# Patient Record
Sex: MALE | Race: BLACK OR AFRICAN AMERICAN | NOT HISPANIC OR LATINO | Employment: UNEMPLOYED | URBAN - NONMETROPOLITAN AREA
[De-identification: names, ages, dates, MRNs, and addresses within clinical notes are randomized per-mention and may not be internally consistent; named-entity substitution may affect disease eponyms.]

---

## 2017-01-09 ENCOUNTER — OFFICE VISIT (OUTPATIENT)
Dept: FAMILY MEDICINE CLINIC | Facility: CLINIC | Age: 57
End: 2017-01-09

## 2017-01-09 VITALS
HEART RATE: 111 BPM | OXYGEN SATURATION: 98 % | HEIGHT: 69 IN | WEIGHT: 315 LBS | RESPIRATION RATE: 16 BRPM | SYSTOLIC BLOOD PRESSURE: 135 MMHG | DIASTOLIC BLOOD PRESSURE: 82 MMHG | BODY MASS INDEX: 46.65 KG/M2 | TEMPERATURE: 97.9 F

## 2017-01-09 DIAGNOSIS — J20.8 ACUTE BRONCHITIS DUE TO OTHER SPECIFIED ORGANISMS: Primary | ICD-10-CM

## 2017-01-09 PROCEDURE — 99213 OFFICE O/P EST LOW 20 MIN: CPT | Performed by: INTERNAL MEDICINE

## 2017-01-09 RX ORDER — DOXYCYCLINE 100 MG/1
100 CAPSULE ORAL 2 TIMES DAILY
Qty: 20 CAPSULE | Refills: 0 | Status: SHIPPED | OUTPATIENT
Start: 2017-01-09 | End: 2017-02-24

## 2017-01-09 RX ORDER — GUAIFENESIN 600 MG/1
1200 TABLET, EXTENDED RELEASE ORAL 2 TIMES DAILY
Qty: 60 TABLET | Refills: 0 | Status: SHIPPED | OUTPATIENT
Start: 2017-01-09 | End: 2017-02-24

## 2017-01-09 NOTE — PATIENT INSTRUCTIONS
afrin in nostril at night prior to sleep.   Vicks vapor rub around nose. At night poir to using cpap.

## 2017-01-09 NOTE — PROGRESS NOTES
"Subjective   Patient ID: Miller Em is a 56 y.o. male Pt is here for management of multiple medical problems.  History of Present Illness  Pt is here for management of multiple medical problems.    Soa. No fever. Myalgias.   Cough with sputum. Sinus pressure and pain.   Started 3 days prior and pt is getting worse. No otc.       The following portions of the patient's history were reviewed and updated as appropriate: allergies, current medications, past family history, past medical history, past social history, past surgical history and problem list.  Review of Systems   Constitutional: Positive for fatigue.   HENT: Positive for congestion, rhinorrhea, sinus pressure, sneezing and sore throat.    Respiratory: Positive for cough, shortness of breath and wheezing.    Neurological: Positive for weakness.       Objective     Visit Vitals   • /82 (BP Location: Left arm, Patient Position: Sitting, Cuff Size: Large Adult)   • Pulse 111   • Temp 97.9 °F (36.6 °C) (Oral)   • Resp 16   • Ht 69\" (175.3 cm)   • Wt (!) 319 lb (145 kg)   • SpO2 98%   • BMI 47.11 kg/m2     Physical Exam  General Appearance:    Alert, cooperative, no distress, appears stated age   Head:    Normocephalic, without obvious abnormality, atraumatic   Eyes:    PERRL, conjunctiva/corneas clear, EOM's intact           Ears:    Normal TM's and external ear canals, both ears   Nose:   Nares normal, septum midline, mucosa normal, no drainage    or sinus tenderness   Throat:   Lips, mucosa, and tongue normal; teeth and gums normal   Neck:   Supple, symmetrical, trachea midline, no adenopathy;        thyroid:  No enlargement/tenderness/nodules; no carotid    bruit or JVD   Back:     Symmetric, no curvature, ROM normal, no CVA tenderness   Lungs:     Course bronchial bs with wheezing  to auscultation bilaterally, respirations unlabored   Chest wall:    No tenderness or deformity   Heart:    Regular rate and rhythm, S1 and S2 normal, no murmur, rub   " or gallop   Abdomen:     Soft, non-tender, bowel sounds active all four quadrants,     no masses, no organomegaly           Extremities:   Extremities normal, atraumatic, no cyanosis or edema   Pulses:   2+ and symmetric all extremities   Skin:   Skin color, texture, turgor normal, no rashes or lesions   Lymph nodes:   Cervical, supraclavicular, and axillary nodes normal   Neurologic:   CNII-XII intact. Normal strength, sensation and reflexes       throughout       Assessment/Plan   Pt is on cpap and this is complicating his illness.           There are no diagnoses linked to this encounter.  Return if symptoms worsen or fail to improve.                   Patient Instructions   afrin in nostril at night prior to sleep.   Vicks vapor rub around nose. At night poir to using cpap.

## 2017-01-09 NOTE — MR AVS SNAPSHOT
Miller Em   1/9/2017 11:30 AM   Office Visit    Dept Phone:  [phone]   Encounter #:  [id number]    Provider:  Praful Rapp MD   Department:  [facility] PRIMARY CARE                Your Full Care Plan              Today's Medication Changes          These changes are accurate as of: 1/9/17 12:03 PM.  If you have any questions, ask your nurse or doctor.               New Medication(s)Ordered:     doxycycline 100 MG capsule   Commonly known as:  MONODOX   Take 1 capsule by mouth 2 (Two) Times a Day.   Started by:  Praful Rapp MD       guaiFENesin 600 MG 12 hr tablet   Commonly known as:  MUCINEX   Take 2 tablets by mouth 2 (Two) Times a Day.   Started by:  Praful Rapp MD         Medication(s)that have changed:     levothyroxine 125 MCG tablet   Commonly known as:  SYNTHROID, LEVOTHROID   Take  by mouth.   What changed:  Another medication with the same name was removed. Continue taking this medication, and follow the directions you see here.   Changed by:  Praful Rapp MD            Where to Get Your Medications      These medications were sent to [facility] [address] [place] [address] - [address] [place], [place] [id number]  - [phone]   [address] [facility] [address]     Phone:  [phone]     doxycycline 100 MG capsule    guaiFENesin 600 MG 12 hr tablet                  Your Updated Medication List          This list is accurate as of: 1/9/17 12:03 PM.  Always use your most recent med list.                allopurinol 100 MG tablet   Commonly known as:  ZYLOPRIM       aspirin 81 MG EC tablet       atorvastatin 20 MG tablet   Commonly known as:  LIPITOR       cholecalciferol 1000 UNITS tablet   Commonly known as:  VITAMIN D3       doxazosin 4 MG tablet   Commonly known as:  CARDURA   TAKE 1 TABLET DAILY AS DIRECTED       doxycycline 100 MG capsule   Commonly known as:  MONODOX   Take 1 capsule by mouth 2 (Two) Times a Day.       fluticasone 50  MCG/ACT nasal spray   Commonly known as:  FLONASE       guaiFENesin 600 MG 12 hr tablet   Commonly known as:  MUCINEX   Take 2 tablets by mouth 2 (Two) Times a Day.       levothyroxine 125 MCG tablet   Commonly known as:  SYNTHROID, LEVOTHROID       losartan 25 MG tablet   Commonly known as:  COZAAR       losartan-hydrochlorothiazide 100-12.5 MG per tablet   Commonly known as:  HYZAAR       metFORMIN 500 MG tablet   Commonly known as:  GLUCOPHAGE       metoprolol succinate XL 50 MG 24 hr tablet   Commonly known as:  TOPROL-XL       MITIGARE 0.6 MG capsule capsule   Generic drug:  colchicine   Take 1 capsule by mouth As Needed (2 pill for gout flair. then 1 per hour till pain gone. or max of 10 pills in 24h, or diarrhea).       Multiple Vitamin tablet       sildenafil 100 MG tablet   Commonly known as:  VIAGRA   Take 1 tablet by mouth Daily As Needed for erectile dysfunction (1/2 to whole pill qd prn.).               You Were Diagnosed With        Codes Comments    Acute bronchitis due to other specified organisms    -  Primary ICD-10-CM: J20.8  ICD-9-CM: 466.0       Instructions    afrin in nostril at night prior to sleep.   Vicks vapor rub around nose. At night poir to using cpap.        Patient Instructions History      Upcoming Appointments     Visit Type Date Time Department    OFFICE VISIT 2017 11:30 AM [facility] [facility]    FOLLOW UP 2017 11:30 AM [facility] NEUROLOGY [place]    OFFICE VISIT 2/10/2017 11:15 AM [facility]      [employer] Signup     [facility] [employer] allows you to send messages to your doctor, view your test results, renew your prescriptions, schedule appointments, and more. To sign up, go to [employer] and click on the Sign Up Now link in the New User? box. Enter your [employer] Activation Code exactly as it appears below along with the last four digits of your Social Security Number and your Date of Birth () to complete the sign-up process. If you do not sign up  "before the expiration date, you must request a new code.    MyChart Activation Code: [email or web address]  Expires: 1/23/2017 12:00 PM    If you have questions, you can email [email or web address] or call [phone] to talk to our MyChart staff. Remember, MyChart is NOT to be used for urgent needs. For medical emergencies, dial 911.               Other Info from Your Visit           Your Appointments     Jan 18, 2017 11:30 AM EST   Follow Up with Buster Macdonald MD, FAAN   [facility] NEUROLOGY (--)    [address]  [facility] [address] [phone]           Arrive 15 minutes prior to appointment.            Feb 10, 2017 11:15 AM EST   Office Visit with Praful Rapp MD   [facility] PRIMARY CARE (--)    [address]  [facility] [address] [phone]           Arrive 15 minutes prior to appointment.              Allergies     Floxin [Ofloxacin]      Lisinopril        Reason for Visit     Hyperparathyroidism follow-up    Sleep Apnea follow-up    sinus congestion green drainage x 3 days    right ear pain x 3 days    Cough coughing up green phlegm x 3 days      Vital Signs     Blood Pressure Pulse Temperature Respirations Height Weight    135/82 (BP Location: Left arm, Patient Position: Sitting, Cuff Size: Large Adult) 111 97.9 °F (36.6 °C) (Oral) 16 69\" (175.3 cm) 319 lb (145 kg)    Oxygen Saturation Body Mass Index Smoking Status             98% 47.11 kg/m2 Former Smoker         Problems and Diagnoses Noted     Acute bronchitis due to other specified organisms    -  Primary        "

## 2017-01-18 ENCOUNTER — OFFICE VISIT (OUTPATIENT)
Dept: NEUROLOGY | Facility: CLINIC | Age: 57
End: 2017-01-18

## 2017-01-18 VITALS
BODY MASS INDEX: 46.65 KG/M2 | HEIGHT: 69 IN | SYSTOLIC BLOOD PRESSURE: 124 MMHG | OXYGEN SATURATION: 98 % | HEART RATE: 89 BPM | WEIGHT: 315 LBS | DIASTOLIC BLOOD PRESSURE: 86 MMHG

## 2017-01-18 DIAGNOSIS — I25.83 CORONARY ARTERY DISEASE DUE TO LIPID RICH PLAQUE: ICD-10-CM

## 2017-01-18 DIAGNOSIS — G25.81 RESTLESS LEGS SYNDROME (RLS): ICD-10-CM

## 2017-01-18 DIAGNOSIS — I25.10 CORONARY ARTERY DISEASE DUE TO LIPID RICH PLAQUE: ICD-10-CM

## 2017-01-18 DIAGNOSIS — G47.34 NOCTURNAL OXYGEN DESATURATION: ICD-10-CM

## 2017-01-18 DIAGNOSIS — G47.19 EXCESSIVE DAYTIME SLEEPINESS: ICD-10-CM

## 2017-01-18 DIAGNOSIS — G47.33 OBSTRUCTIVE SLEEP APNEA: Primary | ICD-10-CM

## 2017-01-18 DIAGNOSIS — R09.81 SINUS CONGESTION: ICD-10-CM

## 2017-01-18 PROCEDURE — 99214 OFFICE O/P EST MOD 30 MIN: CPT | Performed by: PSYCHIATRY & NEUROLOGY

## 2017-01-18 NOTE — PROGRESS NOTES
[facility] NEUROLOGY [place] PROGRESS NOTE  History of Present Illness     Date: 2/26/2017    Patient Identification  Miller Em is a 56 y.o. male.    Patient information was obtained from patient.  History/Exam limitations: none.    Original consultation requested by: MD Dionte      Chief Complaint   Follow-up (CPAP use)      Insomnia   This is a chronic problem. The problem occurs daily. The problem has been gradually worsening. Associated symptoms include headaches. Pertinent negatives include no abdominal pain, anorexia, arthralgias, change in bowel habit, chest pain, chills, congestion, coughing, diaphoresis, fever, joint swelling, myalgias, nausea, neck pain, numbness, rash, sore throat, swollen glands, urinary symptoms, vertigo, visual change, vomiting or weakness. Treatments tried: increase total sleep time.     Migraine and Sleep Apnea   Patient is a pleasant 56-year-old gentleman with history of migraine headache since November 2014.  Patient also with diagnosis of obstructive sleep apnea and had been placed on nasal CPAP.  However patient has difficulty tolerating his nasal CPAP due to the noise of the nasal CPAP machine, or due to his sinus congestion.      PMH:   Past Medical History   Diagnosis Date   • Diabetes    • Diabetes mellitus    • Gout    • Hyperlipidemia    • Hypertension    • Thyroid condition        Past Surgical History:   Past Surgical History   Procedure Laterality Date   • Eye surgery       Cataract       Family Hisotry:   Family History   Problem Relation Age of Onset   • Stroke Other    • Hyperlipidemia Other    • Stroke Father        Social History:   Social History     Social History   • Marital status: Single     Spouse name: N/A   • Number of children: N/A   • Years of education: N/A     Occupational History   • Not on file.     Social History Main Topics   • Smoking status: Former Smoker     Years: 5.00     Types: Cigarettes   • Smokeless tobacco: Never Used       Comment: on and off x 10-15 years 1 pack per week   • Alcohol use Yes      Comment: 1-2 wkly   • Drug use: No   • Sexual activity: Defer     Other Topics Concern   • Not on file     Social History Narrative       Medications:   Current Outpatient Prescriptions   Medication Sig Dispense Refill   • allopurinol (ZYLOPRIM) 100 MG tablet Take 1 tablet by mouth daily.     • atorvastatin (LIPITOR) 20 MG tablet Take 1 tablet by mouth.     • doxazosin (CARDURA) 4 MG tablet TAKE 1 TABLET DAILY AS DIRECTED 90 tablet 2   • metFORMIN (GLUCOPHAGE) 500 MG tablet Take 1 tablet by mouth.     • metoprolol succinate XL (TOPROL-XL) 50 MG 24 hr tablet Take 1 tablet by mouth.     • aspirin 81 MG EC tablet Take  by mouth daily.     • cholecalciferol (VITAMIN D3) 1000 UNITS tablet Take 2 capsules by mouth daily.     • levothyroxine (SYNTHROID, LEVOTHROID) 125 MCG tablet Take 50 mcg by mouth.     • losartan (COZAAR) 100 MG tablet Take 1 tablet by mouth Daily. 90 tablet 3   • MITIGARE 0.6 MG capsule capsule Take 1 capsule by mouth As Needed (2 pill for gout flair. then 1 per hour till pain gone. or max of 10 pills in 24h, or diarrhea). 30 capsule 1   • Multiple Vitamin tablet Take  by mouth daily.     • sildenafil (VIAGRA) 100 MG tablet Take 1 tablet by mouth Daily As Needed for erectile dysfunction (1/2 to whole pill qd prn.). 6 tablet 0     No current facility-administered medications for this visit.        Allergy:   Allergies   Allergen Reactions   • Floxin [Ofloxacin]    • Lisinopril        Review of Systems:  Review of Systems   Constitutional: Negative for chills, diaphoresis and fever.   HENT: Negative for congestion, ear pain, hearing loss, rhinorrhea and sore throat.    Eyes: Negative for pain, discharge and redness.   Respiratory: Negative for cough, shortness of breath, wheezing and stridor.    Cardiovascular: Negative for chest pain, palpitations and leg swelling.   Gastrointestinal: Negative for abdominal pain, anorexia, change  "in bowel habit, constipation, nausea and vomiting.   Endocrine: Negative for cold intolerance, heat intolerance and polyphagia.   Genitourinary: Negative for dysuria, flank pain, frequency and urgency.   Musculoskeletal: Negative for arthralgias, joint swelling, myalgias, neck pain and neck stiffness.   Skin: Negative for pallor, rash and wound.   Allergic/Immunologic: Negative for environmental allergies.   Neurological: Positive for headaches. Negative for dizziness, vertigo, tremors, seizures, syncope, facial asymmetry, speech difficulty, weakness, light-headedness and numbness.   Hematological: Negative for adenopathy.   Psychiatric/Behavioral: Positive for sleep disturbance. Negative for confusion and hallucinations. The patient has insomnia. The patient is not nervous/anxious.        Physical Exam     Vitals:    01/18/17 1139   BP: 124/86   BP Location: Right arm   Patient Position: Sitting   Pulse: 89   SpO2: 98%   Weight: (!) 321 lb (146 kg)   Height: 69\" (175.3 cm)     GENERAL: Patient is pleasant, cooperative, appears to be stated age.  Body habitus is endomorphic.  SKIN AND EXTREMITIES:  No skin rashes or lesions are noted.  No cyanosis, clubbing or edema of the extremities.    HEAD:  Head is normocephalic and atraumatic.    NECK: Neck are non-tender without thyromegaly or adenopathy.  Carotic upstrokes are 1+/4.  No cranial or cervical bruits.  The neck is supple with a full range of motion.   ENT: palate elevate symmetrically, no evidence of high arch palate, tongue midline erythema in posterior pharynx, Mallampati Classification Class III patient also have inflamed nasal turbinate  CARDIOVASCULAR:  Regular rate and rhythm with normal S1 and S2 without rub or gallop.  RESPIRATORY:  Clear to auscultation without wheezes or crackle   ABDOMEN:  Soft and non-tender, positive bowel sound without hepatosplenomegaly  BACK:  Back is straight without midline defect.    PSYCH:  Higher cortical function/mental " status:  The patient is alert.  She is oriented x3 to time, place and person.  Recent and the remote memory appear normal.  The patient has a good fund of knowledge.  There is no visual or auditory hallucination or suicidal or homicidal ideation.  SPEECH:There is no gross evidence of aphasia, dysarthria or agnosia.      CRANIAL NERVES:  Pupils are 4mm, equal round reactive to light, reacting briskly to 2mm without afferent pupillary defect.  Visual fields are intact to confrontation testing.  Fundoscopic examination reveals sharp disk margins with normal vasculature.  No papilledema, hemorrhages or exudates.  Extraocular movements are full and smooth with normal pursuits and saccades.  No nystagmus noted.  The face is symmetric. palate elevate symmetrically, Tongue midline, positive gag reflex. The remainder of the cranial nerves are intact and symmetrical.    MOTOR: Strength is 5/5 throughout with normal tone and bulk with the following exceptions, 4/5 intrinsic muscles of the hands and feet.  No involuntary movements noted.    Deep Tendon Reflexes: are 2/4 and symmetrical in the upper extremities, 2/4 and symmetrical at the knees and 1/4 and symmetrical at the Achilles tendon.  Plantar responses were down-going bilaterally.    SENSATION:  Intact to pinprick, light touch, vibration and proprioception.  Coordination:  The patient normally performs finger-nose-finger, heel-to-knee-to-shin and rapid alternating movements in symmetrical fashion.    COORDINATION AND GAIT:  The patient walks with a narrow-based gait.  Patient is able to heel-toe and tandem walk forward and backwards without difficulty.  Romberg and monopedal  Romberg are negative.    MUSCULOSKELETAL: Range of motion normal, no clubbing, cyanosis, or edema.  No joint swelling.            Records Reviewed: I have personally reviewed his previous medical record.    Miller was seen today for follow-up.    Diagnoses and all orders for this  visit:    Obstructive sleep apnea    Excessive daytime sleepiness    Nocturnal oxygen desaturation    Restless legs syndrome (RLS)    Coronary artery disease due to lipid rich plaque    Sinus congestion      Treatments:  1. Avoid sleep deprivation  2. Need to have regular sleep wake schedule  3. Encourage compliance with nasal CPAP.  4. Continue Flonase for nasal allergy  5. Use Neti Pot for nasal allergy  6.  Counseled patient on sleep apnea as follow  I have counseled patient extensively on Sleep Hygiene including regular sleep wake schedule and stimulus control therapy.  I have also discussed the importance of weight reduction because 10% reduction in body weight can reduce sleep apnea by 50 %. We have also discussed abstaining from smoking and drinking.  I have explained to patient that obstructive apnea episode is defined as the absence of airflow for at least 10 seconds.  Sleep apnea is usually accompanied by snoring, disturbed sleep, and daytime sleepiness. Patients with micrognathia, retrognathia, enlarged tonsils, tongue enlargement, and acromegaly are especially predisposed to obstructive sleep apnea. Abnormalities or weakness in the muscles can also contribute to obstructive sleep apnea. Obesity can also contribute to sleep apnea.     Sleep apnea can lead to a number of complications, ranging from daytime sleepiness to possible increased risk of cardiovascular risks.   Daytime sleepiness is the most serious.  Daytime sleepiness can also increase the risk for accident-related injuries. Several studies have suggested that people with sleep apnea have two to three times as many car accidents, and five to seven times the risk for multiple accidents.   A number of cardiovascular diseases -- including high blood pressure, heart failure, stroke, and heart arrhythmias -- have an association with obstructive sleep apnea.   Up to a third of patients with heart failure also have sleep apnea. Both central and  obstructive sleep apnea are linked with heart failure. Obstructive sleep apnea is also noted to be associated with type 2 diabetes according to Dr. Meier at [facility].  The best treatment for symptomatic obstructive sleep apnea is continuous positive airflow pressure (CPAP). Bilevel positive airway pressure (BPAP) systems may be particularly helpful for patients with coexisting lung disease and those with excessive levels of carbon dioxide.  Other treatment options including UPPP surgery, LAUP surgery, radiofrequency somnoplasty and dental appliances such Meagan or Clearway.      This Document is signed by Buster Macdonald MD, FAAN, FAASM   1/18/2017

## 2017-01-18 NOTE — MR AVS SNAPSHOT
Miller Em   1/18/2017 11:30 AM   Office Visit    Dept Phone:  [phone]   Encounter #:  [id number]    Provider:  Buster Macdonald MD, FAAN   Department:  [facility] NEUROLOGY                Your Full Care Plan              Your Updated Medication List          This list is accurate as of: 1/18/17 11:59 AM.  Always use your most recent med list.                allopurinol 100 MG tablet   Commonly known as:  ZYLOPRIM       aspirin 81 MG EC tablet       atorvastatin 20 MG tablet   Commonly known as:  LIPITOR       cholecalciferol 1000 UNITS tablet   Commonly known as:  VITAMIN D3       doxazosin 4 MG tablet   Commonly known as:  CARDURA   TAKE 1 TABLET DAILY AS DIRECTED       doxycycline 100 MG capsule   Commonly known as:  MONODOX   Take 1 capsule by mouth 2 (Two) Times a Day.       fluticasone 50 MCG/ACT nasal spray   Commonly known as:  FLONASE       guaiFENesin 600 MG 12 hr tablet   Commonly known as:  MUCINEX   Take 2 tablets by mouth 2 (Two) Times a Day.       levothyroxine 125 MCG tablet   Commonly known as:  SYNTHROID, LEVOTHROID       losartan 25 MG tablet   Commonly known as:  COZAAR       losartan-hydrochlorothiazide 100-12.5 MG per tablet   Commonly known as:  HYZAAR       metFORMIN 500 MG tablet   Commonly known as:  GLUCOPHAGE       metoprolol succinate XL 50 MG 24 hr tablet   Commonly known as:  TOPROL-XL       MITIGARE 0.6 MG capsule capsule   Generic drug:  colchicine   Take 1 capsule by mouth As Needed (2 pill for gout flair. then 1 per hour till pain gone. or max of 10 pills in 24h, or diarrhea).       Multiple Vitamin tablet       sildenafil 100 MG tablet   Commonly known as:  VIAGRA   Take 1 tablet by mouth Daily As Needed for erectile dysfunction (1/2 to whole pill qd prn.).               You Were Diagnosed With        Codes Comments    Obstructive sleep apnea    -  Primary ICD-10-CM: G47.33  ICD-9-CM: 327.23     Excessive daytime sleepiness      "ICD-10-CM: G47.19  ICD-9-CM: 780.54     Nocturnal oxygen desaturation     ICD-10-CM: G47.34  ICD-9-CM: 327.24     Restless legs syndrome (RLS)     ICD-10-CM: G25.81  ICD-9-CM: 333.94     Coronary artery disease due to lipid rich plaque     ICD-10-CM: I25.10, I25.83  ICD-9-CM: 414.00, 414.3       Instructions     None    Patient Instructions History      Upcoming Appointments     Visit Type Date Time Department    FOLLOW UP 2017 11:30 AM [facility] NEUROLOGY [place]    OFFICE VISIT 2/10/2017 11:15 AM [facility]      [employer] Signup     [facility] [employer] allows you to send messages to your doctor, view your test results, renew your prescriptions, schedule appointments, and more. To sign up, go to [employer] and click on the Sign Up Now link in the New User? box. Enter your [employer] Activation Code exactly as it appears below along with the last four digits of your Social Security Number and your Date of Birth () to complete the sign-up process. If you do not sign up before the expiration date, you must request a new code.    [employer] Activation Code: [email or web address]  Expires: 2017 12:00 PM    If you have questions, you can email [email or web address] or call [phone] to talk to our [employer] staff. Remember, [employer] is NOT to be used for urgent needs. For medical emergencies, dial 911.               Other Info from Your Visit           Your Appointments     Feb 10, 2017 11:15 AM EST   Office Visit with Praful Rapp MD   [facility] MEDICAL [facility] PRIMARY CARE (--)    [address] [address] [phone]           Arrive 15 minutes prior to appointment.              Allergies     Floxin [Ofloxacin]      Lisinopril        Reason for Visit     Follow-up CPAP use      Vital Signs     Blood Pressure Pulse Height Weight Oxygen Saturation Body Mass Index    124/86 (BP Location: Right arm, Patient Position: Sitting) 89 69\" (175.3 cm) 321 lb (146 " kg) 98% 47.4 kg/m2    Smoking Status                   Former Smoker           Problems and Diagnoses Noted     Sleep apnea    -  Primary    Excessive daytime sleepiness        Nocturnal oxygen desaturation        Restless legs syndrome        Coronary artery disease due to lipid rich plaque

## 2017-02-20 ENCOUNTER — LAB (OUTPATIENT)
Dept: FAMILY MEDICINE CLINIC | Facility: CLINIC | Age: 57
End: 2017-02-20

## 2017-02-20 DIAGNOSIS — E21.3 HYPERPARATHYROIDISM (HCC): ICD-10-CM

## 2017-02-20 DIAGNOSIS — M10.00 ACUTE IDIOPATHIC GOUT, UNSPECIFIED SITE: ICD-10-CM

## 2017-02-20 DIAGNOSIS — E03.9 ACQUIRED HYPOTHYROIDISM: ICD-10-CM

## 2017-02-20 LAB
CA-I SERPL ISE-MCNC: 1.46 MMOL/L (ref 1.12–1.32)
T4 FREE SERPL-MCNC: 1.18 NG/DL (ref 0.89–1.76)
TSH SERPL DL<=0.05 MIU/L-ACNC: 1.69 MIU/ML (ref 0.35–5.35)
URATE SERPL-MCNC: 8.2 MG/DL (ref 3.7–9.2)

## 2017-02-20 PROCEDURE — 84439 ASSAY OF FREE THYROXINE: CPT | Performed by: INTERNAL MEDICINE

## 2017-02-20 PROCEDURE — 83970 ASSAY OF PARATHORMONE: CPT | Performed by: INTERNAL MEDICINE

## 2017-02-20 PROCEDURE — 82330 ASSAY OF CALCIUM: CPT | Performed by: INTERNAL MEDICINE

## 2017-02-20 PROCEDURE — 84443 ASSAY THYROID STIM HORMONE: CPT | Performed by: INTERNAL MEDICINE

## 2017-02-20 PROCEDURE — 84550 ASSAY OF BLOOD/URIC ACID: CPT | Performed by: INTERNAL MEDICINE

## 2017-02-20 PROCEDURE — 84481 FREE ASSAY (FT-3): CPT | Performed by: INTERNAL MEDICINE

## 2017-02-21 LAB
PTH-INTACT SERPL-MCNC: 40 PG/ML (ref 15–65)
T3FREE SERPL-MCNC: 3.1 PG/ML (ref 2–4.4)

## 2017-02-24 ENCOUNTER — OFFICE VISIT (OUTPATIENT)
Dept: FAMILY MEDICINE CLINIC | Facility: CLINIC | Age: 57
End: 2017-02-24

## 2017-02-24 VITALS
SYSTOLIC BLOOD PRESSURE: 110 MMHG | RESPIRATION RATE: 16 BRPM | OXYGEN SATURATION: 99 % | BODY MASS INDEX: 46.65 KG/M2 | HEIGHT: 69 IN | WEIGHT: 315 LBS | TEMPERATURE: 98.2 F | HEART RATE: 76 BPM | DIASTOLIC BLOOD PRESSURE: 67 MMHG

## 2017-02-24 DIAGNOSIS — E21.3 HYPERPARATHYROIDISM (HCC): ICD-10-CM

## 2017-02-24 DIAGNOSIS — I10 ESSENTIAL HYPERTENSION: Primary | ICD-10-CM

## 2017-02-24 DIAGNOSIS — E03.9 ACQUIRED HYPOTHYROIDISM: ICD-10-CM

## 2017-02-24 DIAGNOSIS — E78.00 PURE HYPERCHOLESTEROLEMIA: ICD-10-CM

## 2017-02-24 DIAGNOSIS — M1A.9XX0 CHRONIC GOUT WITHOUT TOPHUS, UNSPECIFIED CAUSE, UNSPECIFIED SITE: ICD-10-CM

## 2017-02-24 PROCEDURE — 99214 OFFICE O/P EST MOD 30 MIN: CPT | Performed by: INTERNAL MEDICINE

## 2017-02-24 RX ORDER — LOSARTAN POTASSIUM 100 MG/1
100 TABLET ORAL DAILY
Qty: 90 TABLET | Refills: 3 | Status: SHIPPED | OUTPATIENT
Start: 2017-02-24 | End: 2017-05-09 | Stop reason: SDUPTHER

## 2017-02-24 NOTE — PROGRESS NOTES
"Subjective   Patient ID: Miller Em is a 56 y.o. male Pt is here for management of multiple medical problems.  History of Present Illness  Pt is here for management of multiple medical problems.    oa in hand flaired up and took advil and it help.   Otherwise feels well. No cp no soa.     BP going low at times.   Light headed at times. Hr fast when bp low.           The following portions of the patient's history were reviewed and updated as appropriate: allergies, current medications, past family history, past medical history, past social history, past surgical history and problem list.      Review of Systems   Constitutional: Negative for fatigue.   Musculoskeletal: Positive for arthralgias.   All other systems reviewed and are negative.      Objective     Visit Vitals   • /67 (BP Location: Left arm, Patient Position: Sitting, Cuff Size: Large Adult)   • Pulse 76   • Temp 98.2 °F (36.8 °C) (Oral)   • Resp 16   • Ht 69\" (175.3 cm)   • Wt (!) 323 lb (147 kg)   • SpO2 99%   • BMI 47.7 kg/m2     Physical Exam  General Appearance:    Alert, cooperative, no distress, appears stated age   Head:    Normocephalic, without obvious abnormality, atraumatic   Eyes:    PERRL, conjunctiva/corneas clear, EOM's intact           Ears:    Normal TM's and external ear canals, both ears   Nose:   Nares normal, septum midline, mucosa normal, no drainage    or sinus tenderness   Throat:   Lips, mucosa, and tongue normal; teeth and gums normal   Neck:   Supple, symmetrical, trachea midline, no adenopathy;        thyroid:  No enlargement/tenderness/nodules; no carotid    bruit or JVD   Back:     Symmetric, no curvature, ROM normal, no CVA tenderness   Lungs:     Clear to auscultation bilaterally, respirations unlabored   Chest wall:    No tenderness or deformity   Heart:    Regular rate and rhythm, S1 and S2 normal, no murmur, rub   or gallop   Abdomen:     Soft, non-tender, bowel sounds active all four quadrants,     no " masses, no organomegaly. Large panus.              Extremities:   Extremities normal, atraumatic, +2 edema   Pulses:   2+ and symmetric all extremities   Skin:   Skin color, texture, turgor normal, no rashes or lesions   Lymph nodes:   Cervical, supraclavicular, and axillary nodes normal   Neurologic:   CNII-XII intact. Normal strength, sensation and reflexes       throughout       Assessment/Plan     Labs stable. Pt will start back on allopurinol  Low bp and pt willing to hold off on hctz.   Meds changed.       Miller was seen today for hypothyroidism, hypertension, diabetes, gout and hyperparthyrodism.    Diagnoses and all orders for this visit:    Essential hypertension  -     losartan (COZAAR) 100 MG tablet; Take 1 tablet by mouth Daily.  -     TSH; Future  -     T4, Free; Future  -     CBC & Differential; Future  -     Comprehensive Metabolic Panel; Future  -     Lipid Panel; Future  -     Hemoglobin A1c; Future  -     MicroAlbumin, Urine, Random; Future  -     Calcium, Ionized; Future  -     PTH, Intact; Future    Pure hypercholesterolemia  -     losartan (COZAAR) 100 MG tablet; Take 1 tablet by mouth Daily.  -     TSH; Future  -     T4, Free; Future  -     CBC & Differential; Future  -     Comprehensive Metabolic Panel; Future  -     Lipid Panel; Future  -     Hemoglobin A1c; Future  -     MicroAlbumin, Urine, Random; Future  -     Calcium, Ionized; Future  -     PTH, Intact; Future    Acquired hypothyroidism  -     losartan (COZAAR) 100 MG tablet; Take 1 tablet by mouth Daily.  -     TSH; Future  -     T4, Free; Future  -     CBC & Differential; Future  -     Comprehensive Metabolic Panel; Future  -     Lipid Panel; Future  -     Hemoglobin A1c; Future  -     MicroAlbumin, Urine, Random; Future  -     Calcium, Ionized; Future  -     PTH, Intact; Future    Hyperparathyroidism  -     losartan (COZAAR) 100 MG tablet; Take 1 tablet by mouth Daily.  -     TSH; Future  -     T4, Free; Future  -     CBC &  Differential; Future  -     Comprehensive Metabolic Panel; Future  -     Lipid Panel; Future  -     Hemoglobin A1c; Future  -     MicroAlbumin, Urine, Random; Future  -     Calcium, Ionized; Future  -     PTH, Intact; Future    Chronic gout without tophus, unspecified cause, unspecified site  -     Uric Acid; Future      No Follow-up on file.                   There are no Patient Instructions on file for this visit.

## 2017-03-10 RX ORDER — METOPROLOL SUCCINATE 50 MG/1
TABLET, EXTENDED RELEASE ORAL
Qty: 90 TABLET | Refills: 1 | Status: SHIPPED | OUTPATIENT
Start: 2017-03-10 | End: 2017-09-26 | Stop reason: SDUPTHER

## 2017-03-10 RX ORDER — ALLOPURINOL 100 MG/1
TABLET ORAL
Qty: 90 TABLET | Refills: 1 | Status: SHIPPED | OUTPATIENT
Start: 2017-03-10 | End: 2017-09-26 | Stop reason: SDUPTHER

## 2017-03-10 RX ORDER — ATORVASTATIN CALCIUM 20 MG/1
TABLET, FILM COATED ORAL
Qty: 90 TABLET | Refills: 1 | Status: SHIPPED | OUTPATIENT
Start: 2017-03-10 | End: 2017-09-26 | Stop reason: SDUPTHER

## 2017-05-09 DIAGNOSIS — E78.00 PURE HYPERCHOLESTEROLEMIA: ICD-10-CM

## 2017-05-09 DIAGNOSIS — E21.3 HYPERPARATHYROIDISM (HCC): ICD-10-CM

## 2017-05-09 DIAGNOSIS — E03.9 ACQUIRED HYPOTHYROIDISM: ICD-10-CM

## 2017-05-09 DIAGNOSIS — I10 ESSENTIAL HYPERTENSION: ICD-10-CM

## 2017-05-09 RX ORDER — LOSARTAN POTASSIUM 100 MG/1
100 TABLET ORAL DAILY
Qty: 90 TABLET | Refills: 3 | Status: SHIPPED | OUTPATIENT
Start: 2017-05-09 | End: 2018-02-01 | Stop reason: SDUPTHER

## 2017-07-24 ENCOUNTER — LAB (OUTPATIENT)
Dept: FAMILY MEDICINE CLINIC | Facility: CLINIC | Age: 57
End: 2017-07-24

## 2017-07-24 DIAGNOSIS — E21.3 HYPERPARATHYROIDISM (HCC): ICD-10-CM

## 2017-07-24 DIAGNOSIS — I10 ESSENTIAL HYPERTENSION: ICD-10-CM

## 2017-07-24 DIAGNOSIS — E03.9 ACQUIRED HYPOTHYROIDISM: ICD-10-CM

## 2017-07-24 DIAGNOSIS — E78.00 PURE HYPERCHOLESTEROLEMIA: ICD-10-CM

## 2017-07-24 DIAGNOSIS — M1A.9XX0 CHRONIC GOUT WITHOUT TOPHUS, UNSPECIFIED CAUSE, UNSPECIFIED SITE: ICD-10-CM

## 2017-07-25 LAB
ALBUMIN SERPL-MCNC: 4.2 G/DL (ref 3.5–5)
ALBUMIN/GLOB SERPL: 1.8 G/DL (ref 1–2)
ALP SERPL-CCNC: 65 U/L (ref 38–126)
ALT SERPL-CCNC: 49 U/L (ref 13–69)
AST SERPL-CCNC: 37 U/L (ref 15–46)
BASOPHILS # BLD AUTO: 0.08 10*3/MM3 (ref 0–0.2)
BASOPHILS NFR BLD AUTO: 1.5 % (ref 0–2.5)
BILIRUB SERPL-MCNC: 0.5 MG/DL (ref 0.2–1.3)
BUN SERPL-MCNC: 16 MG/DL (ref 7–20)
BUN/CREAT SERPL: 12.3 (ref 6.3–21.9)
CA-I SERPL ISE-MCNC: 6.3 MG/DL (ref 4.5–5.6)
CALCIUM SERPL-MCNC: 11 MG/DL (ref 8.4–10.2)
CHLORIDE SERPL-SCNC: 109 MMOL/L (ref 98–107)
CHOLEST SERPL-MCNC: 108 MG/DL (ref 0–199)
CO2 SERPL-SCNC: 26 MMOL/L (ref 26–30)
CREAT SERPL-MCNC: 1.3 MG/DL (ref 0.6–1.3)
EOSINOPHIL # BLD AUTO: 0.27 10*3/MM3 (ref 0–0.7)
EOSINOPHIL NFR BLD AUTO: 4.9 % (ref 0–7)
ERYTHROCYTE [DISTWIDTH] IN BLOOD BY AUTOMATED COUNT: 18.6 % (ref 11.5–14.5)
GLOBULIN SER CALC-MCNC: 2.4 GM/DL
GLUCOSE SERPL-MCNC: 100 MG/DL (ref 74–98)
HBA1C MFR BLD: 6.6 %
HCT VFR BLD AUTO: 50.1 % (ref 42–52)
HDLC SERPL-MCNC: 28 MG/DL (ref 40–60)
HGB BLD-MCNC: 15.6 G/DL (ref 14–18)
IMM GRANULOCYTES # BLD: 0.02 10*3/MM3 (ref 0–0.06)
IMM GRANULOCYTES NFR BLD: 0.4 % (ref 0–0.6)
LDLC SERPL CALC-MCNC: 58 MG/DL (ref 0–99)
LYMPHOCYTES # BLD AUTO: 2.02 10*3/MM3 (ref 0.6–3.4)
LYMPHOCYTES NFR BLD AUTO: 36.7 % (ref 10–50)
MCH RBC QN AUTO: 27 PG (ref 27–31)
MCHC RBC AUTO-ENTMCNC: 31.1 G/DL (ref 30–37)
MCV RBC AUTO: 86.7 FL (ref 80–94)
MICROALBUMIN UR-MCNC: 20.1 UG/ML
MONOCYTES # BLD AUTO: 0.59 10*3/MM3 (ref 0–0.9)
MONOCYTES NFR BLD AUTO: 10.7 % (ref 0–12)
NEUTROPHILS # BLD AUTO: 2.53 10*3/MM3 (ref 2–6.9)
NEUTROPHILS NFR BLD AUTO: 45.8 % (ref 37–80)
NRBC BLD AUTO-RTO: 0 /100 WBC (ref 0–0)
PLATELET # BLD AUTO: 187 10*3/MM3 (ref 130–400)
POTASSIUM SERPL-SCNC: 4.7 MMOL/L (ref 3.5–5.1)
PROT SERPL-MCNC: 6.6 G/DL (ref 6.3–8.2)
PTH-INTACT SERPL-MCNC: 86 PG/ML (ref 15–65)
RBC # BLD AUTO: 5.78 10*6/MM3 (ref 4.7–6.1)
SODIUM SERPL-SCNC: 145 MMOL/L (ref 137–145)
T4 FREE SERPL-MCNC: 1.18 NG/DL (ref 0.78–2.19)
TRIGL SERPL-MCNC: 111 MG/DL
TSH SERPL DL<=0.005 MIU/L-ACNC: 2.61 MIU/ML (ref 0.47–4.68)
URATE SERPL-MCNC: 7 MG/DL (ref 2.5–8.5)
VLDLC SERPL CALC-MCNC: 22.2 MG/DL
WBC # BLD AUTO: 5.51 10*3/MM3 (ref 4.8–10.8)

## 2017-07-28 ENCOUNTER — OFFICE VISIT (OUTPATIENT)
Dept: FAMILY MEDICINE CLINIC | Facility: CLINIC | Age: 57
End: 2017-07-28

## 2017-07-28 ENCOUNTER — HOSPITAL ENCOUNTER (OUTPATIENT)
Dept: GENERAL RADIOLOGY | Facility: HOSPITAL | Age: 57
Discharge: HOME | End: 2017-07-28
Attending: INTERNAL MEDICINE | Admitting: INTERNAL MEDICINE

## 2017-07-28 VITALS
OXYGEN SATURATION: 98 % | WEIGHT: 315 LBS | SYSTOLIC BLOOD PRESSURE: 113 MMHG | DIASTOLIC BLOOD PRESSURE: 92 MMHG | TEMPERATURE: 98.1 F | RESPIRATION RATE: 16 BRPM | HEART RATE: 68 BPM | HEIGHT: 69 IN | BODY MASS INDEX: 46.65 KG/M2

## 2017-07-28 DIAGNOSIS — G47.33 OSA (OBSTRUCTIVE SLEEP APNEA): ICD-10-CM

## 2017-07-28 DIAGNOSIS — I10 ESSENTIAL HYPERTENSION: Primary | ICD-10-CM

## 2017-07-28 DIAGNOSIS — E03.9 ACQUIRED HYPOTHYROIDISM: ICD-10-CM

## 2017-07-28 DIAGNOSIS — M1A.9XX0 CHRONIC GOUT WITHOUT TOPHUS, UNSPECIFIED CAUSE, UNSPECIFIED SITE: ICD-10-CM

## 2017-07-28 DIAGNOSIS — I10 BENIGN ESSENTIAL HYPERTENSION: ICD-10-CM

## 2017-07-28 DIAGNOSIS — E21.3 HYPERPARATHYROIDISM (HCC): ICD-10-CM

## 2017-07-28 DIAGNOSIS — R91.8 LUNG MASS: Primary | ICD-10-CM

## 2017-07-28 PROCEDURE — 71020 HC CHEST PA AND LATERAL: CPT

## 2017-07-28 PROCEDURE — 99214 OFFICE O/P EST MOD 30 MIN: CPT | Performed by: INTERNAL MEDICINE

## 2017-07-28 RX ORDER — LEVOTHYROXINE SODIUM 137 UG/1
137 TABLET ORAL DAILY
Qty: 90 TABLET | Refills: 3 | Status: SHIPPED | OUTPATIENT
Start: 2017-07-28 | End: 2017-08-04 | Stop reason: SDUPTHER

## 2017-07-28 NOTE — PROGRESS NOTES
"Subjective   Patient ID: Miller Em is a 57 y.o. male Pt is here for management of multiple medical problems.    Chief Complaint   Patient presents with   • Hypertension     5 month follow-up       History of Present Illness  Feels well.       Tolerating meds well.       The following portions of the patient's history were reviewed and updated as appropriate: allergies, current medications, past family history, past medical history, past social history, past surgical history and problem list.      Review of Systems   Constitutional: Negative for fatigue.   All other systems reviewed and are negative.      Objective     /92 (BP Location: Left arm, Patient Position: Sitting, Cuff Size: Large Adult)  Pulse 68  Temp 98.1 °F (36.7 °C) (Oral)   Resp 16  Ht 69\" (175.3 cm)  Wt (!) 318 lb (144 kg)  SpO2 98%  BMI 46.96 kg/m2  Physical Exam  General Appearance:    Alert, cooperative, no distress, appears stated age   Head:    Normocephalic, without obvious abnormality, atraumatic   Eyes:    PERRL, conjunctiva/corneas clear, EOM's intact           Ears:    Normal TM's and external ear canals, both ears   Nose:   Nares normal, septum midline, mucosa normal, no drainage    or sinus tenderness   Throat:   Lips, mucosa, and tongue normal; teeth and gums normal   Neck:   Supple, symmetrical, trachea midline, no adenopathy;        thyroid:  No enlargement/tenderness/nodules; no carotid    bruit or JVD   Back:     Symmetric, no curvature, ROM normal, no CVA tenderness   Lungs:     Clear to auscultation bilaterally, respirations unlabored   Chest wall:    No tenderness or deformity   Heart:    Regular rate and rhythm, S1 and S2 normal, no murmur, rub   or gallop   Abdomen:     Soft, non-tender, bowel sounds active all four quadrants,     no masses, no organomegaly           Extremities:   Extremities normal, atraumatic, no cyanosis or edema   Pulses:   2+ and symmetric all extremities   Skin:   Skin color, texture, " turgor normal, no rashes or lesions   Lymph nodes:   Cervical, supraclavicular, and axillary nodes normal   Neurologic:   CNII-XII intact. Normal strength, sensation and reflexes       throughout       Assessment/Plan     Not compliant with edward treatment.    Labs discussed and stable.           Miller was seen today for hypertension.    Diagnoses and all orders for this visit:    Essential hypertension  -     TSH  -     T4, Free  -     T3, Free  -     PTH, Intact & Calcium  -     Calcium, Ionized  -     Comprehensive Metabolic Panel  -     CBC & Differential  -     Hemoglobin A1c    Acquired hypothyroidism  -     TSH  -     T4, Free  -     T3, Free  -     PTH, Intact & Calcium  -     Calcium, Ionized  -     Comprehensive Metabolic Panel  -     CBC & Differential  -     Hemoglobin A1c    Benign essential hypertension  -     TSH  -     T4, Free  -     T3, Free  -     PTH, Intact & Calcium  -     Calcium, Ionized  -     Comprehensive Metabolic Panel  -     CBC & Differential  -     Hemoglobin A1c  -     XR Chest PA & Lateral; Future    Hyperparathyroidism  -     TSH  -     T4, Free  -     T3, Free  -     PTH, Intact & Calcium  -     Calcium, Ionized  -     Comprehensive Metabolic Panel  -     CBC & Differential  -     Hemoglobin A1c  -     XR Chest PA & Lateral; Future    Chronic gout without tophus, unspecified cause, unspecified site  -     TSH  -     T4, Free  -     T3, Free  -     PTH, Intact & Calcium  -     Calcium, Ionized  -     Comprehensive Metabolic Panel  -     CBC & Differential  -     Hemoglobin A1c    EDWARD (obstructive sleep apnea)  -     TSH  -     T4, Free  -     T3, Free  -     PTH, Intact & Calcium  -     Calcium, Ionized  -     Comprehensive Metabolic Panel  -     CBC & Differential  -     Hemoglobin A1c    Other orders  -     Tdap (BOOSTRIX) 5-2.5-18.5 LF-MCG/0.5 injection; Inject 0.5 mL into the shoulder, thigh, or buttocks 1 (One) Time for 1 dose.  -     levothyroxine (SYNTHROID, LEVOTHROID) 137  MCG tablet; Take 1 tablet by mouth Daily.      Return in about 6 months (around 1/28/2018).                   There are no Patient Instructions on file for this visit.

## 2017-08-04 RX ORDER — LEVOTHYROXINE SODIUM 137 UG/1
137 TABLET ORAL DAILY
Qty: 90 TABLET | Refills: 3 | Status: SHIPPED | OUTPATIENT
Start: 2017-08-04 | End: 2018-02-01 | Stop reason: SDUPTHER

## 2017-08-07 ENCOUNTER — HOSPITAL ENCOUNTER (OUTPATIENT)
Dept: CT IMAGING | Facility: HOSPITAL | Age: 57
Discharge: HOME | End: 2017-08-07
Attending: INTERNAL MEDICINE | Admitting: INTERNAL MEDICINE

## 2017-08-07 DIAGNOSIS — R91.8 LUNG MASS: ICD-10-CM

## 2017-08-07 PROCEDURE — 0 IOPAMIDOL PER 1 ML: Performed by: INTERNAL MEDICINE

## 2017-08-07 PROCEDURE — 71260 CT THORAX DX C+: CPT

## 2017-08-07 RX ADMIN — IOPAMIDOL 100 ML: 510 INJECTION, SOLUTION INTRAVASCULAR at 09:00

## 2017-08-14 DIAGNOSIS — E27.8 RIGHT ADRENAL MASS (HCC): Primary | ICD-10-CM

## 2017-09-26 RX ORDER — DOXAZOSIN MESYLATE 4 MG/1
TABLET ORAL
Qty: 90 TABLET | Refills: 2 | Status: SHIPPED | OUTPATIENT
Start: 2017-09-26 | End: 2018-02-01 | Stop reason: SDUPTHER

## 2017-09-26 RX ORDER — ATORVASTATIN CALCIUM 20 MG/1
TABLET, FILM COATED ORAL
Qty: 90 TABLET | Refills: 2 | Status: SHIPPED | OUTPATIENT
Start: 2017-09-26 | End: 2018-02-01 | Stop reason: SDUPTHER

## 2017-09-26 RX ORDER — METOPROLOL SUCCINATE 50 MG/1
TABLET, EXTENDED RELEASE ORAL
Qty: 90 TABLET | Refills: 2 | Status: SHIPPED | OUTPATIENT
Start: 2017-09-26 | End: 2018-02-01 | Stop reason: SDUPTHER

## 2017-09-26 RX ORDER — ALLOPURINOL 100 MG/1
TABLET ORAL
Qty: 90 TABLET | Refills: 1 | Status: SHIPPED | OUTPATIENT
Start: 2017-09-26 | End: 2018-02-01 | Stop reason: SDUPTHER

## 2017-12-26 ENCOUNTER — HOSPITAL ENCOUNTER (OUTPATIENT)
Dept: CT IMAGING | Facility: HOSPITAL | Age: 57
Discharge: HOME | End: 2017-12-26
Attending: INTERNAL MEDICINE | Admitting: INTERNAL MEDICINE

## 2017-12-26 DIAGNOSIS — E27.8 RIGHT ADRENAL MASS (HCC): ICD-10-CM

## 2017-12-26 LAB
CREAT BLD-MCNC: 1.3 MG/DL (ref 0.6–1.3)
GFR SERPL CREATININE-BSD FRML MDRD: 68 ML/MIN/1.73

## 2017-12-26 PROCEDURE — 0 IOPAMIDOL 61 % SOLUTION: Performed by: INTERNAL MEDICINE

## 2017-12-26 PROCEDURE — 82565 ASSAY OF CREATININE: CPT | Performed by: INTERNAL MEDICINE

## 2017-12-26 PROCEDURE — 74178 CT ABD&PLV WO CNTR FLWD CNTR: CPT

## 2017-12-26 RX ADMIN — IOPAMIDOL 100 ML: 612 INJECTION, SOLUTION INTRAVENOUS at 09:00

## 2017-12-27 LAB
ALBUMIN SERPL-MCNC: 4.4 G/DL (ref 3.5–5)
ALBUMIN/GLOB SERPL: 1.8 G/DL (ref 1–2)
ALP SERPL-CCNC: 71 U/L (ref 38–126)
ALT SERPL-CCNC: 55 U/L (ref 13–69)
AST SERPL-CCNC: 32 U/L (ref 15–46)
BASOPHILS # BLD AUTO: 0.05 10*3/MM3 (ref 0–0.2)
BASOPHILS NFR BLD AUTO: 0.9 % (ref 0–2.5)
BILIRUB SERPL-MCNC: 0.3 MG/DL (ref 0.2–1.3)
BUN SERPL-MCNC: 14 MG/DL (ref 7–20)
BUN/CREAT SERPL: 11.7 (ref 6.3–21.9)
CA-I SERPL ISE-MCNC: 6 MG/DL (ref 4.5–5.6)
CALCIUM SERPL-MCNC: 11.4 MG/DL (ref 8.4–10.2)
CHLORIDE SERPL-SCNC: 106 MMOL/L (ref 98–107)
CO2 SERPL-SCNC: 28 MMOL/L (ref 26–30)
CREAT SERPL-MCNC: 1.2 MG/DL (ref 0.6–1.3)
EOSINOPHIL # BLD AUTO: 0.13 10*3/MM3 (ref 0–0.7)
EOSINOPHIL NFR BLD AUTO: 2.3 % (ref 0–7)
ERYTHROCYTE [DISTWIDTH] IN BLOOD BY AUTOMATED COUNT: 18.1 % (ref 11.5–14.5)
GLOBULIN SER CALC-MCNC: 2.5 GM/DL
GLUCOSE SERPL-MCNC: 99 MG/DL (ref 74–98)
HBA1C MFR BLD: 6.9 %
HCT VFR BLD AUTO: 49.6 % (ref 42–52)
HGB BLD-MCNC: 14.7 G/DL (ref 14–18)
IMM GRANULOCYTES # BLD: 0.03 10*3/MM3 (ref 0–0.06)
IMM GRANULOCYTES NFR BLD: 0.5 % (ref 0–0.6)
INTACT PTH: NORMAL
LYMPHOCYTES # BLD AUTO: 2.09 10*3/MM3 (ref 0.6–3.4)
LYMPHOCYTES NFR BLD AUTO: 37.7 % (ref 10–50)
MCH RBC QN AUTO: 25.9 PG (ref 27–31)
MCHC RBC AUTO-ENTMCNC: 29.6 G/DL (ref 30–37)
MCV RBC AUTO: 87.5 FL (ref 80–94)
MONOCYTES # BLD AUTO: 0.63 10*3/MM3 (ref 0–0.9)
MONOCYTES NFR BLD AUTO: 11.4 % (ref 0–12)
NEUTROPHILS # BLD AUTO: 2.62 10*3/MM3 (ref 2–6.9)
NEUTROPHILS NFR BLD AUTO: 47.2 % (ref 37–80)
NRBC BLD AUTO-RTO: 0 /100 WBC (ref 0–0)
PLATELET # BLD AUTO: 199 10*3/MM3 (ref 130–400)
POTASSIUM SERPL-SCNC: 4.6 MMOL/L (ref 3.5–5.1)
PROT SERPL-MCNC: 6.9 G/DL (ref 6.3–8.2)
PTH-INTACT SERPL-MCNC: 55 PG/ML (ref 15–65)
RBC # BLD AUTO: 5.67 10*6/MM3 (ref 4.7–6.1)
SODIUM SERPL-SCNC: 144 MMOL/L (ref 137–145)
T3FREE SERPL-MCNC: 3 PG/ML (ref 2–4.4)
T4 FREE SERPL-MCNC: 1.25 NG/DL (ref 0.78–2.19)
TSH SERPL DL<=0.005 MIU/L-ACNC: 2.01 MIU/ML (ref 0.47–4.68)
WBC # BLD AUTO: 5.55 10*3/MM3 (ref 4.8–10.8)

## 2018-01-11 ENCOUNTER — OFFICE VISIT (OUTPATIENT)
Dept: INTERNAL MEDICINE | Facility: CLINIC | Age: 58
End: 2018-01-11

## 2018-01-11 ENCOUNTER — HOSPITAL ENCOUNTER (OUTPATIENT)
Dept: GENERAL RADIOLOGY | Facility: HOSPITAL | Age: 58
Discharge: HOME | End: 2018-01-11
Admitting: INTERNAL MEDICINE

## 2018-01-11 VITALS
TEMPERATURE: 98.1 F | HEIGHT: 69 IN | HEART RATE: 78 BPM | BODY MASS INDEX: 46.65 KG/M2 | OXYGEN SATURATION: 99 % | SYSTOLIC BLOOD PRESSURE: 125 MMHG | RESPIRATION RATE: 16 BRPM | DIASTOLIC BLOOD PRESSURE: 84 MMHG | WEIGHT: 315 LBS

## 2018-01-11 DIAGNOSIS — G47.33 OSA (OBSTRUCTIVE SLEEP APNEA): ICD-10-CM

## 2018-01-11 DIAGNOSIS — M25.532 LEFT WRIST PAIN: ICD-10-CM

## 2018-01-11 DIAGNOSIS — I10 ESSENTIAL HYPERTENSION: Primary | ICD-10-CM

## 2018-01-11 DIAGNOSIS — E03.9 ACQUIRED HYPOTHYROIDISM: ICD-10-CM

## 2018-01-11 DIAGNOSIS — E27.8 ADRENAL MASS, RIGHT (HCC): ICD-10-CM

## 2018-01-11 DIAGNOSIS — E11.9 DIABETES MELLITUS WITHOUT COMPLICATION (HCC): ICD-10-CM

## 2018-01-11 DIAGNOSIS — E78.00 PURE HYPERCHOLESTEROLEMIA: ICD-10-CM

## 2018-01-11 PROCEDURE — 73110 X-RAY EXAM OF WRIST: CPT

## 2018-01-11 PROCEDURE — 99214 OFFICE O/P EST MOD 30 MIN: CPT | Performed by: INTERNAL MEDICINE

## 2018-01-11 NOTE — PROGRESS NOTES
Subjective     Patient ID: Miller Em is a 57 y.o. male. Patient is here for management of multiple medical problems.     Chief Complaint   Patient presents with   • Hypertension     6 month follow-up   • Pain     patient having pain on and off in his left wrist and right shoulder x 2 months, worse the last few weeks     Hypertension   This is a chronic problem. The current episode started more than 1 year ago. The problem has been waxing and waning since onset. The problem is controlled. Pertinent negatives include no anxiety, blurred vision, chest pain or headaches. Past treatments include angiotensin blockers. The current treatment provides significant improvement. There are no compliance problems.    Pain   This is a chronic problem. The current episode started more than 1 year ago. Pertinent negatives include no chest pain or headaches.              The following portions of the patient's history were reviewed and updated as appropriate: allergies, current medications, past family history, past medical history, past social history, past surgical history and problem list.    Review of Systems   Eyes: Negative for blurred vision.   Cardiovascular: Negative for chest pain.   Neurological: Negative for headaches.       Current Outpatient Prescriptions:   •  aspirin 81 MG EC tablet, Take  by mouth daily., Disp: , Rfl:   •  atorvastatin (LIPITOR) 20 MG tablet, TAKE 1 TABLET DAILY, Disp: 90 tablet, Rfl: 2  •  cholecalciferol (VITAMIN D3) 1000 UNITS tablet, Take 2 capsules by mouth daily., Disp: , Rfl:   •  doxazosin (CARDURA) 4 MG tablet, TAKE 1 TABLET DAILY AS DIRECTED, Disp: 90 tablet, Rfl: 2  •  levothyroxine (SYNTHROID, LEVOTHROID) 137 MCG tablet, Take 1 tablet by mouth Daily., Disp: 90 tablet, Rfl: 3  •  losartan (COZAAR) 100 MG tablet, Take 1 tablet by mouth Daily., Disp: 90 tablet, Rfl: 3  •  metFORMIN (GLUCOPHAGE) 500 MG tablet, TAKE 1 TABLET DAILY, Disp: 90 tablet, Rfl: 2  •  metoprolol succinate XL  "(TOPROL-XL) 50 MG 24 hr tablet, TAKE 1 TABLET DAILY, Disp: 90 tablet, Rfl: 2  •  Multiple Vitamin tablet, Take  by mouth daily., Disp: , Rfl:   •  allopurinol (ZYLOPRIM) 100 MG tablet, TAKE 1 TABLET DAILY, Disp: 90 tablet, Rfl: 1  •  sildenafil (VIAGRA) 100 MG tablet, Take 1 tablet by mouth Daily As Needed for erectile dysfunction (1/2 to whole pill qd prn.)., Disp: 6 tablet, Rfl: 0    Objective      Blood pressure 125/84, pulse 78, temperature 98.1 °F (36.7 °C), temperature source Oral, resp. rate 16, height 175.3 cm (69\"), weight (!) 150 kg (331 lb), SpO2 99 %.    Physical Exam     General Appearance:    Alert, cooperative, no distress, appears stated age   Head:    Normocephalic, without obvious abnormality, atraumatic   Eyes:    PERRL, conjunctiva/corneas clear, EOM's intact   Ears:    Normal TM's and external ear canals, both ears   Nose:   Nares normal, septum midline, mucosa normal, no drainage   or sinus tenderness   Throat:   Narrowed oral air way.  Lips, mucosa, and tongue normal; teeth and gums normal   Neck:   Supple, symmetrical, trachea midline, no adenopathy;        thyroid:  No enlargement/tenderness/nodules; no carotid    bruit or JVD   Back:     Symmetric, no curvature, ROM normal, no CVA tenderness   Lungs:     Clear to auscultation bilaterally, respirations unlabored   Chest wall:    No tenderness or deformity   Heart:    Regular rate and rhythm, S1 and S2 normal, no murmur,        rub or gallop   Abdomen:     Soft, non-tender, bowel sounds active all four quadrants,     no masses, no organomegaly   Extremities:   Extremities normal, atraumatic, no cyanosis or edema   Pulses:   2+ and symmetric all extremities   Skin:   Skin color, texture, turgor normal, no rashes or lesions   Lymph nodes:   Cervical, supraclavicular, and axillary nodes normal   Neurologic:   CNII-XII intact. Normal strength, sensation and reflexes       throughout      Results for orders placed or performed during the hospital " encounter of 12/26/17   Creatinine, Serum   Result Value Ref Range    Creatinine 1.30 0.60 - 1.30 mg/dL    eGFR  African Amer 68 >60 mL/min/1.73         Assessment/Plan   No PE findings for cushing's. Get 24 hour urine labs to eval mass in adrenal mass.      Miller was seen today for hypertension and pain.    Diagnoses and all orders for this visit:    Essential hypertension    Pure hypercholesterolemia    Acquired hypothyroidism    Diabetes mellitus without complication    RIGO (obstructive sleep apnea)  -     Ambulatory Referral to Neurology    Left wrist pain  -     XR wrist 3+ vw left; Future    Adrenal mass, right  -     Metanephrines, Urine, 24 Hour - Urine, Clean Catch  -     Catecholamine+VMA, 24-Hr Urine - Urine, Clean Catch  -     Cortisol, Urine, 24 Hour - Urine, Clean Catch      Return in about 3 months (around 4/11/2018).          There are no Patient Instructions on file for this visit.     Praful Rapp MD    Assessment/Plan

## 2018-01-15 NOTE — PROGRESS NOTES
Please let them know the xr show extensive arthritis  See if he is willing to see ortho in [place].;

## 2018-01-17 DIAGNOSIS — M25.539 PAIN IN WRIST, UNSPECIFIED LATERALITY: Primary | ICD-10-CM

## 2018-01-26 LAB
DOPAMINE 24H UR-MRATE: 402 UG/24 HR (ref 0–510)
DOPAMINE UR-MCNC: 261 UG/L
EPINEPH 24H UR-MRATE: 22 UG/24 HR (ref 0–20)
EPINEPH UR-MCNC: 14 UG/L
METANEPH 24H UR-MRATE: 285 UG/24 HR (ref 45–290)
METANEPHS 24H UR-MCNC: 185 UG/L
NOREPINEPH 24H UR-MRATE: 68 UG/24 HR (ref 0–135)
NOREPINEPH UR-MCNC: 44 UG/L
NORMETANEPHRINE 24H UR-MCNC: 402 UG/L
NORMETANEPHRINE 24H UR-MRATE: 619 UG/24 HR (ref 82–500)
VMA 24H UR-MRATE: 4.2 MG/24 HR (ref 0–7.5)
VMA UR-MCNC: 2.7 MG/L

## 2018-02-01 DIAGNOSIS — E03.9 ACQUIRED HYPOTHYROIDISM: ICD-10-CM

## 2018-02-01 DIAGNOSIS — I10 ESSENTIAL HYPERTENSION: ICD-10-CM

## 2018-02-01 DIAGNOSIS — E21.3 HYPERPARATHYROIDISM (HCC): ICD-10-CM

## 2018-02-01 DIAGNOSIS — E78.00 PURE HYPERCHOLESTEROLEMIA: ICD-10-CM

## 2018-02-01 RX ORDER — LEVOTHYROXINE SODIUM 137 UG/1
137 TABLET ORAL DAILY
Qty: 90 TABLET | Refills: 3 | Status: SHIPPED | OUTPATIENT
Start: 2018-02-01 | End: 2019-01-17 | Stop reason: SDUPTHER

## 2018-02-01 RX ORDER — DOXAZOSIN MESYLATE 4 MG/1
4 TABLET ORAL DAILY
Qty: 90 TABLET | Refills: 3 | Status: SHIPPED | OUTPATIENT
Start: 2018-02-01 | End: 2019-01-17 | Stop reason: SDUPTHER

## 2018-02-01 RX ORDER — METOPROLOL SUCCINATE 50 MG/1
50 TABLET, EXTENDED RELEASE ORAL DAILY
Qty: 90 TABLET | Refills: 3 | Status: SHIPPED | OUTPATIENT
Start: 2018-02-01 | End: 2019-04-08 | Stop reason: SDUPTHER

## 2018-02-01 RX ORDER — LOSARTAN POTASSIUM 100 MG/1
100 TABLET ORAL DAILY
Qty: 90 TABLET | Refills: 3 | Status: SHIPPED | OUTPATIENT
Start: 2018-02-01 | End: 2019-01-17 | Stop reason: SDUPTHER

## 2018-02-01 RX ORDER — ATORVASTATIN CALCIUM 20 MG/1
20 TABLET, FILM COATED ORAL NIGHTLY
Qty: 90 TABLET | Refills: 3 | Status: SHIPPED | OUTPATIENT
Start: 2018-02-01 | End: 2019-01-17 | Stop reason: SDUPTHER

## 2018-02-01 RX ORDER — ALLOPURINOL 100 MG/1
100 TABLET ORAL DAILY
Qty: 90 TABLET | Refills: 3 | Status: SHIPPED | OUTPATIENT
Start: 2018-02-01 | End: 2019-01-17 | Stop reason: SDUPTHER

## 2018-02-02 DIAGNOSIS — D49.7 ADRENAL TUMOR: Primary | ICD-10-CM

## 2018-03-09 ENCOUNTER — OFFICE VISIT (OUTPATIENT)
Dept: NEUROLOGY | Facility: CLINIC | Age: 58
End: 2018-03-09

## 2018-03-09 VITALS
HEART RATE: 110 BPM | BODY MASS INDEX: 46.65 KG/M2 | OXYGEN SATURATION: 99 % | DIASTOLIC BLOOD PRESSURE: 82 MMHG | WEIGHT: 315 LBS | HEIGHT: 69 IN | SYSTOLIC BLOOD PRESSURE: 120 MMHG

## 2018-03-09 DIAGNOSIS — G47.33 OSA (OBSTRUCTIVE SLEEP APNEA): Primary | ICD-10-CM

## 2018-03-09 PROCEDURE — 99244 OFF/OP CNSLTJ NEW/EST MOD 40: CPT | Performed by: PSYCHIATRY & NEUROLOGY

## 2018-03-09 NOTE — PROGRESS NOTES
[facility] NEUROLOGY [place] PROGRESS NOTE  History of Present Illness     Date: 3/10/2018    Patient Identification  Miller Em is a 57 y.o. male.    Patient information was obtained from patient.  History/Exam limitations: none.    Original consultation requested by: Praful Rapp MD      Chief Complaint   Follow-up (Pt is here for a FU for RIGO. )      History of Present Illness   Patient is a pleasant 57-year-old referred to [facility] for evaluation of obstructive sleep apnea.  Patient reported that since last CPAP titration which was performing in 2013.  Patient has gain significant amount of weight.  Patient started snoring again.  Patient is having excessive daytime sleepiness.  Sleep fragmentation.  Over the last month and he was not able to tolerate the nasal CPAP because of smothering feeling.  Patient is currently on nasal CPAP at 14 cm of water pressure and he is asking for other treatment option we have discussed with patient would regard to the BiPAP treatment.  Patient is interested in the trial of nasal BiPAP titration    PMH:   Past Medical History:   Diagnosis Date   • Diabetes    • Diabetes mellitus    • Gout    • Hyperlipidemia    • Hypertension    • Thyroid condition        Past Surgical History:   Past Surgical History:   Procedure Laterality Date   • EYE SURGERY      Cataract       Family Hisotry:   Family History   Problem Relation Age of Onset   • Stroke Other    • Hyperlipidemia Other    • Stroke Father        Social History:   Social History     Social History   • Marital status: Single     Spouse name: N/A   • Number of children: N/A   • Years of education: N/A     Occupational History   • Not on file.     Social History Main Topics   • Smoking status: Former Smoker     Years: 5.00     Types: Cigarettes   • Smokeless tobacco: Never Used      Comment: on and off x 10-15 years 1 pack per week   • Alcohol use Yes      Comment: 1-2 wkly   • Drug use: No   •  Sexual activity: Defer     Other Topics Concern   • Not on file     Social History Narrative   • No narrative on file       Medications:   Current Outpatient Prescriptions   Medication Sig Dispense Refill   • allopurinol (ZYLOPRIM) 100 MG tablet Take 1 tablet by mouth Daily. 90 tablet 3   • aspirin 81 MG EC tablet Take  by mouth daily.     • atorvastatin (LIPITOR) 20 MG tablet Take 1 tablet by mouth Every Night. 90 tablet 3   • cholecalciferol (VITAMIN D3) 1000 UNITS tablet Take 2 capsules by mouth daily.     • doxazosin (CARDURA) 4 MG tablet Take 1 tablet by mouth Daily. 90 tablet 3   • levothyroxine (SYNTHROID, LEVOTHROID) 137 MCG tablet Take 1 tablet by mouth Daily. 90 tablet 3   • losartan (COZAAR) 100 MG tablet Take 1 tablet by mouth Daily. 90 tablet 3   • metFORMIN (GLUCOPHAGE) 500 MG tablet Take 1 tablet by mouth Daily With Breakfast. 90 tablet 2   • metoprolol succinate XL (TOPROL-XL) 50 MG 24 hr tablet Take 1 tablet by mouth Daily. 90 tablet 3   • Multiple Vitamin tablet Take  by mouth daily.       No current facility-administered medications for this visit.        Allergy:   Allergies   Allergen Reactions   • Floxin [Ofloxacin]    • Lisinopril        Review of Systems:  Review of Systems   Constitutional: Positive for fatigue and unexpected weight change. Negative for chills and fever.   HENT: Negative for congestion, ear pain, hearing loss, rhinorrhea and sore throat.    Eyes: Negative for pain, discharge and redness.   Respiratory: Positive for apnea. Negative for cough, shortness of breath, wheezing and stridor.    Cardiovascular: Negative for chest pain, palpitations and leg swelling.   Gastrointestinal: Negative for abdominal pain, constipation, nausea and vomiting.   Endocrine: Negative for cold intolerance, heat intolerance and polyphagia.   Genitourinary: Negative for dysuria, flank pain, frequency and urgency.   Musculoskeletal: Negative for joint swelling, myalgias, neck pain and neck stiffness.  "  Skin: Negative for pallor, rash and wound.   Allergic/Immunologic: Negative for environmental allergies.   Neurological: Negative for dizziness, tremors, seizures, syncope, facial asymmetry, speech difficulty, weakness, light-headedness, numbness and headaches.   Hematological: Negative for adenopathy.   Psychiatric/Behavioral: Positive for sleep disturbance. Negative for confusion and hallucinations. The patient is not nervous/anxious.        Physical Exam     Vitals:    03/09/18 1544   BP: 120/82   Pulse: 110   SpO2: 99%   Weight: (!) 150 kg (331 lb)   Height: 175.3 cm (69\")     GENERAL: Patient is pleasant, cooperative, appears to be stated age.  Body habitus is endomorphic.  SKIN AND EXTREMITIES:  No skin rashes or lesions are noted.  No cyanosis, clubbing or edema of the extremities.    HEAD:  Head is normocephalic and atraumatic.    NECK: Neck are non-tender without thyromegaly or adenopathy.  Carotic upstrokes are 1+/4.  No cranial or cervical bruits.  The neck is supple with a full range of motion.   ENT: palate elevate symmetrically, no evidence of high arch palate, tongue midline erythema in posterior pharynx, Mallampati Classification Class III   CARDIOVASCULAR:  Regular rate and rhythm with normal S1 and S2 without rub or gallop.  RESPIRATORY:  Clear to auscultation without wheezes or crackle   ABDOMEN:  Soft and non-tender, positive bowel sound without hepatosplenomegaly  BACK:  Back is straight without midline defect.    PSYCH:  Higher cortical function/mental status:  The patient is alert.  She is oriented x3 to time, place and person.  Recent and the remote memory appear normal.  The patient has a good fund of knowledge.  There is no visual or auditory hallucination or suicidal or homicidal ideation.  SPEECH:There is no gross evidence of aphasia, dysarthria or agnosia.      CRANIAL NERVES:  Pupils are 4mm, equal round reactive to light, reacting briskly to 2mm without afferent pupillary defect.  " Visual fields are intact to confrontation testing.  Fundoscopic examination reveals sharp disk margins with normal vasculature.  No papilledema, hemorrhages or exudates.  Extraocular movements are full and smooth with normal pursuits and saccades.  No nystagmus noted.  The face is symmetric. palate elevate symmetrically, Tongue midline, positive gag reflex. The remainder of the cranial nerves are intact and symmetrical.    MOTOR: Strength is 5/5 throughout with normal tone and bulk with the following exceptions, 4/5 intrinsic muscles of the hands and feet.  No involuntary movements noted.    Deep Tendon Reflexes: are 2/4 and symmetrical in the upper extremities, 2/4 and symmetrical at the knees and 1/4 and symmetrical at the Achilles tendon.  Plantar responses were down-going bilaterally.    SENSATION:  Intact to pinprick, light touch, vibration and proprioception.  Coordination:  The patient normally performs finger-nose-finger, heel-to-knee-to-shin and rapid alternating movements in symmetrical fashion.    COORDINATION AND GAIT:  The patient walks with a narrow-based gait.  Patient is able to heel-toe and tandem walk forward and backwards without difficulty.  Romberg and monopedal  Romberg are negative.    MUSCULOSKELETAL: Range of motion normal, no clubbing, cyanosis, or edema.  No joint swelling.            Records Reviewed: I have personally reviewed his previous medical record.    Miller was seen today for follow-up.    Diagnoses and all orders for this visit:    RIGO (obstructive sleep apnea)  -     Polysomnography 4 or More Parameters With CPAP; Future        Treatments:  1. Patient is currently on nasal CPAP at 14 cm of water pressure and he is asking for other treatment option we have discussed with patient would regard to the BiPAP treatment.  Patient is interested in the trial of nasal BiPAP titration  2. Counseled patient on good sleep hygiene  3.  Encourage regular sleep wake schedule  4.  Avoid sleep  deprivation  5.  Counseled patient on sleep apnea as follow  Discussion:  I have counseled patient extensively on Sleep Hygiene including regular sleep wake schedule and stimulus control therapy.  I have also discussed the importance of weight reduction because 10% reduction in body weight can reduce sleep apnea by 50 %. We have also discussed abstaining from smoking and drinking.  I have explained to patient that obstructive apnea episode is defined as the absence of airflow for at least 10 seconds.  Sleep apnea is usually accompanied by snoring, disturbed sleep, and daytime sleepiness. Patients with micrognathia, retrognathia, enlarged tonsils, tongue enlargement, and acromegaly are especially predisposed to obstructive sleep apnea. Abnormalities or weakness in the muscles can also contribute to obstructive sleep apnea. Obesity can also contribute to sleep apnea.     Sleep apnea can lead to a number of complications, ranging from daytime sleepiness to possible increased risk of cardiovascular risks.   Daytime sleepiness is the most serious.  Daytime sleepiness can also increase the risk for accident-related injuries. Several studies have suggested that people with sleep apnea have two to three times as many car accidents, and five to seven times the risk for multiple accidents.   A number of cardiovascular diseases -- including high blood pressure, heart failure, stroke, and heart arrhythmias -- have an association with obstructive sleep apnea.   Up to a third of patients with heart failure also have sleep apnea. Both central and obstructive sleep apnea are linked with heart failure. Obstructive sleep apnea is also noted to be associated with type 2 diabetes according to Dr. Meier at [facility].  The best treatment for symptomatic obstructive sleep apnea is continuous positive airflow pressure (CPAP). Bilevel positive airway pressure (BPAP) systems may be particularly helpful for patients with coexisting lung  disease and those with excessive levels of carbon dioxide.  Other treatment options including UPPP surgery, LAUP surgery, radiofrequency somnoplasty and dental appliances such Meagan or Clearway.  This Document is signed by Buster Macdonald MD, FAAN, FAASM   March 10, 51288:09 PM

## 2018-04-19 DIAGNOSIS — D49.7 ADRENAL TUMOR: Primary | ICD-10-CM

## 2018-04-19 LAB
DOPAMINE 24H UR-MRATE: 480 UG/24 HR (ref 0–510)
DOPAMINE UR-MCNC: 240 UG/L
EPINEPH 24H UR-MRATE: 10 UG/24 HR (ref 0–20)
EPINEPH UR-MCNC: 5 UG/L
METANEPH 24H UR-MRATE: 298 UG/24 HR (ref 45–290)
METANEPHS 24H UR-MCNC: 149 UG/L
NOREPINEPH 24H UR-MRATE: 68 UG/24 HR (ref 0–135)
NOREPINEPH UR-MCNC: 34 UG/L
NORMETANEPHRINE 24H UR-MCNC: 307 UG/L
NORMETANEPHRINE 24H UR-MRATE: 614 UG/24 HR (ref 82–500)
VMA 24H UR-MRATE: 3.6 MG/24 HR (ref 0–7.5)
VMA UR-MCNC: 1.8 MG/L

## 2018-05-04 ENCOUNTER — TRANSCRIBE ORDERS (OUTPATIENT)
Dept: ADMINISTRATIVE | Facility: HOSPITAL | Age: 58
End: 2018-05-04

## 2018-05-04 DIAGNOSIS — E27.8 OTHER SPECIFIED DISORDERS OF ADRENAL GLAND (HCC): Primary | ICD-10-CM

## 2018-05-14 ENCOUNTER — HOSPITAL ENCOUNTER (OUTPATIENT)
Dept: CT IMAGING | Facility: HOSPITAL | Age: 58
Discharge: HOME | End: 2018-05-14
Attending: SURGERY | Admitting: SURGERY

## 2018-05-14 DIAGNOSIS — E27.8 OTHER SPECIFIED DISORDERS OF ADRENAL GLAND (HCC): ICD-10-CM

## 2018-05-14 LAB — CREAT BLDA-MCNC: 1.4 MG/DL (ref 0.6–1.3)

## 2018-05-14 PROCEDURE — 82565 ASSAY OF CREATININE: CPT

## 2018-05-14 PROCEDURE — 74178 CT ABD&PLV WO CNTR FLWD CNTR: CPT

## 2018-05-14 PROCEDURE — 0 IOPAMIDOL PER 1 ML: Performed by: SURGERY

## 2018-05-14 RX ADMIN — IOPAMIDOL 95 ML: 755 INJECTION, SOLUTION INTRAVENOUS at 11:20

## 2018-05-21 DIAGNOSIS — I10 ESSENTIAL HYPERTENSION: ICD-10-CM

## 2018-05-21 DIAGNOSIS — M1A.9XX0 CHRONIC GOUT WITHOUT TOPHUS, UNSPECIFIED CAUSE, UNSPECIFIED SITE: ICD-10-CM

## 2018-05-21 DIAGNOSIS — E55.9 VITAMIN D DEFICIENCY: ICD-10-CM

## 2018-05-21 DIAGNOSIS — E11.9 DIABETES MELLITUS WITHOUT COMPLICATION (HCC): ICD-10-CM

## 2018-05-21 DIAGNOSIS — I10 BENIGN ESSENTIAL HYPERTENSION: Primary | ICD-10-CM

## 2018-06-29 LAB
25(OH)D3+25(OH)D2 SERPL-MCNC: 21.4 NG/ML
ALBUMIN SERPL-MCNC: 4.2 G/DL (ref 3.5–5)
ALBUMIN/GLOB SERPL: 1.7 G/DL (ref 1–2)
ALP SERPL-CCNC: 70 U/L (ref 38–126)
ALT SERPL-CCNC: 45 U/L (ref 13–69)
AST SERPL-CCNC: 31 U/L (ref 15–46)
BASOPHILS # BLD AUTO: 0.03 10*3/MM3 (ref 0–0.2)
BASOPHILS NFR BLD AUTO: 0.6 % (ref 0–2.5)
BILIRUB SERPL-MCNC: 0.5 MG/DL (ref 0.2–1.3)
BUN SERPL-MCNC: 15 MG/DL (ref 7–20)
BUN/CREAT SERPL: 13.6 (ref 6.3–21.9)
CALCIUM SERPL-MCNC: 11.1 MG/DL (ref 8.4–10.2)
CHLORIDE SERPL-SCNC: 104 MMOL/L (ref 98–107)
CHOLEST SERPL-MCNC: 113 MG/DL (ref 0–199)
CO2 SERPL-SCNC: 30 MMOL/L (ref 26–30)
CREAT SERPL-MCNC: 1.1 MG/DL (ref 0.6–1.3)
EOSINOPHIL # BLD AUTO: 0.14 10*3/MM3 (ref 0–0.7)
EOSINOPHIL NFR BLD AUTO: 2.8 % (ref 0–7)
ERYTHROCYTE [DISTWIDTH] IN BLOOD BY AUTOMATED COUNT: 17.4 % (ref 11.5–14.5)
GLOBULIN SER CALC-MCNC: 2.5 GM/DL
GLUCOSE SERPL-MCNC: 96 MG/DL (ref 74–98)
HBA1C MFR BLD: 6.5 %
HCT VFR BLD AUTO: 48.9 % (ref 42–52)
HDLC SERPL-MCNC: 34 MG/DL (ref 40–60)
HGB BLD-MCNC: 15.1 G/DL (ref 14–18)
IMM GRANULOCYTES # BLD: 0.02 10*3/MM3 (ref 0–0.06)
IMM GRANULOCYTES NFR BLD: 0.4 % (ref 0–0.6)
LDLC SERPL CALC-MCNC: 59 MG/DL (ref 0–99)
LYMPHOCYTES # BLD AUTO: 1.82 10*3/MM3 (ref 0.6–3.4)
LYMPHOCYTES NFR BLD AUTO: 36.8 % (ref 10–50)
MCH RBC QN AUTO: 27 PG (ref 27–31)
MCHC RBC AUTO-ENTMCNC: 30.9 G/DL (ref 30–37)
MCV RBC AUTO: 87.3 FL (ref 80–94)
MONOCYTES # BLD AUTO: 0.62 10*3/MM3 (ref 0–0.9)
MONOCYTES NFR BLD AUTO: 12.5 % (ref 0–12)
NEUTROPHILS # BLD AUTO: 2.32 10*3/MM3 (ref 2–6.9)
NEUTROPHILS NFR BLD AUTO: 46.9 % (ref 37–80)
NRBC BLD AUTO-RTO: 0 /100 WBC (ref 0–0)
PLATELET # BLD AUTO: 211 10*3/MM3 (ref 130–400)
POTASSIUM SERPL-SCNC: 4.6 MMOL/L (ref 3.5–5.1)
PROT SERPL-MCNC: 6.7 G/DL (ref 6.3–8.2)
RBC # BLD AUTO: 5.6 10*6/MM3 (ref 4.7–6.1)
SODIUM SERPL-SCNC: 142 MMOL/L (ref 137–145)
TRIGL SERPL-MCNC: 99 MG/DL
TSH SERPL DL<=0.005 MIU/L-ACNC: 1.05 MIU/ML (ref 0.47–4.68)
URATE SERPL-MCNC: 6.9 MG/DL (ref 2.5–8.5)
VIT B12 SERPL-MCNC: 454 PG/ML (ref 239–931)
VLDLC SERPL CALC-MCNC: 19.8 MG/DL
WBC # BLD AUTO: 4.95 10*3/MM3 (ref 4.8–10.8)

## 2018-07-17 ENCOUNTER — OFFICE VISIT (OUTPATIENT)
Dept: INTERNAL MEDICINE | Facility: CLINIC | Age: 58
End: 2018-07-17

## 2018-07-17 VITALS
HEART RATE: 71 BPM | HEIGHT: 69 IN | OXYGEN SATURATION: 100 % | RESPIRATION RATE: 16 BRPM | TEMPERATURE: 98.1 F | WEIGHT: 315 LBS | SYSTOLIC BLOOD PRESSURE: 117 MMHG | BODY MASS INDEX: 46.65 KG/M2 | DIASTOLIC BLOOD PRESSURE: 75 MMHG

## 2018-07-17 DIAGNOSIS — R07.2 PRECORDIAL PAIN: ICD-10-CM

## 2018-07-17 DIAGNOSIS — I10 ESSENTIAL HYPERTENSION: Primary | ICD-10-CM

## 2018-07-17 DIAGNOSIS — M79.642 LEFT HAND PAIN: ICD-10-CM

## 2018-07-17 DIAGNOSIS — E11.9 DIABETES MELLITUS WITHOUT COMPLICATION (HCC): ICD-10-CM

## 2018-07-17 PROCEDURE — 90471 IMMUNIZATION ADMIN: CPT | Performed by: INTERNAL MEDICINE

## 2018-07-17 PROCEDURE — 90715 TDAP VACCINE 7 YRS/> IM: CPT | Performed by: INTERNAL MEDICINE

## 2018-07-17 PROCEDURE — 99214 OFFICE O/P EST MOD 30 MIN: CPT | Performed by: INTERNAL MEDICINE

## 2018-07-17 RX ORDER — ASPIRIN 81 MG/1
81 TABLET ORAL DAILY
Qty: 30 TABLET | Refills: 11 | Status: SHIPPED | OUTPATIENT
Start: 2018-07-17

## 2018-07-17 NOTE — PROGRESS NOTES
Subjective     Patient ID: Miller Em is a 58 y.o. male. Patient is here for management of multiple medical problems.     Chief Complaint   Patient presents with   • Hypertension     follow-up   • Pain     patient having pain in his left index finger and left foot x 2 weeks   • Pain     patient also having mid chest pain in 2 areas on and off  x 2 weeks     History of Present Illness   Pt working a lot of over time.     Had tumor eval for adrenal mass. Deemed inactive.    occ pain in left thumb and index finger.  X weeks. No otc. getts wrist inj at times.    Chest pain x 1 month.   Waxes and wanes.    No association with activity.    The following portions of the patient's history were reviewed and updated as appropriate: allergies, current medications, past family history, past medical history, past social history, past surgical history and problem list.    Review of Systems   Constitutional: Positive for fatigue.   Musculoskeletal: Positive for arthralgias. Negative for joint swelling and myalgias.   Psychiatric/Behavioral: Negative for sleep disturbance.   All other systems reviewed and are negative.      Current Outpatient Prescriptions:   •  allopurinol (ZYLOPRIM) 100 MG tablet, Take 1 tablet by mouth Daily., Disp: 90 tablet, Rfl: 3  •  aspirin 81 MG EC tablet, Take 1 tablet by mouth Daily., Disp: 30 tablet, Rfl: 11  •  atorvastatin (LIPITOR) 20 MG tablet, Take 1 tablet by mouth Every Night., Disp: 90 tablet, Rfl: 3  •  cholecalciferol (VITAMIN D3) 1000 UNITS tablet, Take 2 capsules by mouth daily., Disp: , Rfl:   •  doxazosin (CARDURA) 4 MG tablet, Take 1 tablet by mouth Daily., Disp: 90 tablet, Rfl: 3  •  levothyroxine (SYNTHROID, LEVOTHROID) 137 MCG tablet, Take 1 tablet by mouth Daily., Disp: 90 tablet, Rfl: 3  •  losartan (COZAAR) 100 MG tablet, Take 1 tablet by mouth Daily., Disp: 90 tablet, Rfl: 3  •  metFORMIN (GLUCOPHAGE) 500 MG tablet, Take 1 tablet by mouth Daily With Breakfast., Disp: 90  "tablet, Rfl: 2  •  metoprolol succinate XL (TOPROL-XL) 50 MG 24 hr tablet, Take 1 tablet by mouth Daily., Disp: 90 tablet, Rfl: 3  •  Multiple Vitamin tablet, Take  by mouth daily., Disp: , Rfl:     Objective      Blood pressure 117/75, pulse 71, temperature 98.1 °F (36.7 °C), temperature source Oral, resp. rate 16, height 175.3 cm (69\"), weight (!) 144 kg (318 lb), SpO2 100 %.    Physical Exam    Miller had a diabetic foot exam performed today.   During the foot exam he had a monofilament test performed.    Neurological Sensory Findings - Unaltered hot/cold right ankle/foot discrimination and unaltered hot/cold left ankle/foot discrimination. Unaltered sharp/dull right ankle/foot discrimination and unaltered sharp/dull left ankle/foot discrimination. No right ankle/foot altered proprioception and no left ankle/foot altered proprioception  Vascular Status -  His right foot exhibits normal foot vasculature  and no edema. His left foot exhibits normal foot vasculature  and no edema.  Skin Integrity  -  His right foot skin is intact.His left foot skin is intact..       General Appearance:    Alert, cooperative, no distress, appears stated age   Head:    Normocephalic, without obvious abnormality, atraumatic   Eyes:    PERRL, conjunctiva/corneas clear, EOM's intact   Ears:    Normal TM's and external ear canals, both ears   Nose:   Nares normal, septum midline, mucosa normal, no drainage   or sinus tenderness   Throat:   Lips, mucosa, and tongue normal; teeth and gums normal   Neck:   Supple, symmetrical, trachea midline, no adenopathy;        thyroid:  No enlargement/tenderness/nodules; no carotid    bruit or JVD   Back:     Symmetric, no curvature, ROM normal, no CVA tenderness   Lungs:     Clear to auscultation bilaterally, respirations unlabored   Chest wall:    No tenderness or deformity   Heart:    Regular rate and rhythm, S1 and S2 normal, no murmur,        rub or gallop   Abdomen:     Soft, non-tender, bowel " sounds active all four quadrants,     no masses, no organomegaly   Extremities:   Extremities normal, atraumatic, no cyanosis or edema   Pulses:   2+ and symmetric all extremities   Skin:   Skin color, texture, turgor normal, no rashes or lesions   Lymph nodes:   Cervical, supraclavicular, and axillary nodes normal   Neurologic:   CNII-XII intact. Normal strength, sensation and reflexes       throughout      Results for orders placed or performed in visit on 05/21/18   TSH   Result Value Ref Range    TSH 1.050 0.470 - 4.680 mIU/mL   Vitamin B12   Result Value Ref Range    Vitamin B-12 454 239 - 931 pg/mL   Vitamin D 25 Hydroxy   Result Value Ref Range    25 Hydroxy, Vitamin D 21.4 ng/ml   Lipid Panel   Result Value Ref Range    Total Cholesterol 113 0 - 199 mg/dL    Triglycerides 99 <150 mg/dL    HDL Cholesterol 34 (L) 40 - 60 mg/dL    VLDL Cholesterol 19.8 mg/dL    LDL Cholesterol  59 0 - 99 mg/dL   Comprehensive Metabolic Panel   Result Value Ref Range    Glucose 96 74 - 98 mg/dL    BUN 15 7 - 20 mg/dL    Creatinine 1.10 0.60 - 1.30 mg/dL    eGFR Non African Am 68 >60 mL/min/1.73    eGFR African Am 83 >60 mL/min/1.73    BUN/Creatinine Ratio 13.6 6.3 - 21.9    Sodium 142 137 - 145 mmol/L    Potassium 4.6 3.5 - 5.1 mmol/L    Chloride 104 98 - 107 mmol/L    Total CO2 30.0 26.0 - 30.0 mmol/L    Calcium 11.1 (H) 8.4 - 10.2 mg/dL    Total Protein 6.7 6.3 - 8.2 g/dL    Albumin 4.20 3.50 - 5.00 g/dL    Globulin 2.5 gm/dL    A/G Ratio 1.7 1.0 - 2.0 g/dL    Total Bilirubin 0.5 0.2 - 1.3 mg/dL    Alkaline Phosphatase 70 38 - 126 U/L    AST (SGOT) 31 15 - 46 U/L    ALT (SGPT) 45 13 - 69 U/L   Uric Acid   Result Value Ref Range    Uric Acid 6.9 2.5 - 8.5 mg/dL   Hemoglobin A1c   Result Value Ref Range    Hemoglobin A1C 6.50 %   CBC & Differential   Result Value Ref Range    WBC 4.95 4.80 - 10.80 10*3/mm3    RBC 5.60 4.70 - 6.10 10*6/mm3    Hemoglobin 15.1 14.0 - 18.0 g/dL    Hematocrit 48.9 42.0 - 52.0 %    MCV 87.3 80.0 -  94.0 fL    MCH 27.0 27.0 - 31.0 pg    MCHC 30.9 30.0 - 37.0 g/dL    RDW 17.4 (H) 11.5 - 14.5 %    Platelets 211 130 - 400 10*3/mm3    Neutrophil Rel % 46.9 37.0 - 80.0 %    Lymphocyte Rel % 36.8 10.0 - 50.0 %    Monocyte Rel % 12.5 (H) 0.0 - 12.0 %    Eosinophil Rel % 2.8 0.0 - 7.0 %    Basophil Rel % 0.6 0.0 - 2.5 %    Neutrophils Absolute 2.32 2.00 - 6.90 10*3/mm3    Lymphocytes Absolute 1.82 0.60 - 3.40 10*3/mm3    Monocytes Absolute 0.62 0.00 - 0.90 10*3/mm3    Eosinophils Absolute 0.14 0.00 - 0.70 10*3/mm3    Basophils Absolute 0.03 0.00 - 0.20 10*3/mm3    Immature Granulocyte Rel % 0.4 0.0 - 0.6 %    Immature Grans Absolute 0.02 0.00 - 0.06 10*3/mm3    nRBC 0.0 0.0 - 0.0 /100 WBC         Assessment/Plan       Miller was seen today for hypertension, pain and pain.    Diagnoses and all orders for this visit:    Essential hypertension  -     TSH  -     T4, Free  -     Comprehensive Metabolic Panel  -     Vitamin B12  -     CBC & Differential  -     Lipid Panel  -     Hemoglobin A1c    Diabetes mellitus without complication (CMS/[facility])  -     Ambulatory Referral to Optometry  -     Vitamin B12  -     CBC & Differential  -     Lipid Panel  -     Hemoglobin A1c  -     MicroAlbumin, Urine, Random - Urine, Clean Catch    Precordial pain  -     Ambulatory Referral to Cardiology    Left hand pain    Other orders  -     Tdap Vaccine Greater Than or Equal To 6yo IM  -     aspirin 81 MG EC tablet; Take 1 tablet by mouth Daily.      Return in about 3 months (around 10/17/2018).     pain gel for left hand pain;. rx sent to [facility].       There are no Patient Instructions on file for this visit.     Praful Rapp MD    Assessment/Plan

## 2018-07-31 ENCOUNTER — CONSULT (OUTPATIENT)
Dept: CARDIOLOGY | Facility: CLINIC | Age: 58
End: 2018-07-31

## 2018-07-31 VITALS
WEIGHT: 315 LBS | HEIGHT: 69 IN | OXYGEN SATURATION: 96 % | SYSTOLIC BLOOD PRESSURE: 112 MMHG | DIASTOLIC BLOOD PRESSURE: 86 MMHG | RESPIRATION RATE: 18 BRPM | HEART RATE: 96 BPM | BODY MASS INDEX: 46.65 KG/M2

## 2018-07-31 DIAGNOSIS — E11.9 DIABETES MELLITUS WITHOUT COMPLICATION (HCC): ICD-10-CM

## 2018-07-31 DIAGNOSIS — R07.2 PRECORDIAL PAIN: ICD-10-CM

## 2018-07-31 DIAGNOSIS — I10 ESSENTIAL HYPERTENSION: Primary | ICD-10-CM

## 2018-07-31 DIAGNOSIS — E78.5 HYPERLIPIDEMIA, UNSPECIFIED HYPERLIPIDEMIA TYPE: ICD-10-CM

## 2018-07-31 DIAGNOSIS — E66.01 MORBID OBESITY (HCC): ICD-10-CM

## 2018-07-31 PROCEDURE — 99203 OFFICE O/P NEW LOW 30 MIN: CPT | Performed by: INTERNAL MEDICINE

## 2018-07-31 PROCEDURE — 93000 ELECTROCARDIOGRAM COMPLETE: CPT | Performed by: INTERNAL MEDICINE

## 2018-07-31 NOTE — PROGRESS NOTES
Subjective:     Encounter Date:07/31/2018      Patient ID: Miller Em is a 58 y.o. male.    Chief Complaint:Chest pain  HPI  This is a 58-year-old male patient with no history of documented heart disease who presents to cardiology clinic with a one-year history of chest discomfort.  The patient reports having a upper sternal discomfort which at times has a sharp stabbing quality and at other times a pressure quality.  The discomfort has a variable frequency.  He indicates that he may go weeks or even months without any discomfort and then will have 2-3 episodes per week.  The discomfort does not radiate.  It has a 3/10 in intensity and generally lasts for approximately 5 minutes.  There is no associated nausea vomiting diaphoresis or shortness of breath.  The discomfort usually occurs at rest and has no pleuritic or exertional component.  He cannot identify any precipitating aggravating or alleviating features.  He has a history of hypertension diabetes and elevated cholesterol all of which are appropriately treated.  He is a former smoker but has not smoked in several years.  He has morbid obesity and has a relatively sedentary lifestyle outside of his occupation.  His family history is remarkable for premature coronary disease.  It has been over 10 years since a normal stress test.  He indicates that he would be able to exercise on the treadmill for purposes of stress testing.  There is no shortness of breath at rest or with activity.  There is no orthopnea PND or lower extremity edema.  There is no dizziness palpitations or syncope.  The following portions of the patient's history were reviewed and updated as appropriate: allergies, current medications, past family history, past medical history, past social history, past surgical history and problem  Review of Systems   Constitution: Positive for malaise/fatigue. Negative for chills, diaphoresis, fever, weakness, weight gain and weight loss.   HENT:  Negative for ear discharge, hearing loss, hoarse voice and nosebleeds.    Eyes: Negative for discharge, double vision, pain and photophobia.   Cardiovascular: Positive for chest pain. Negative for claudication, cyanosis, dyspnea on exertion, irregular heartbeat, leg swelling, near-syncope, orthopnea, palpitations, paroxysmal nocturnal dyspnea and syncope.   Respiratory: Negative for cough, hemoptysis, shortness of breath, sputum production and wheezing.    Endocrine: Negative for cold intolerance, heat intolerance, polydipsia, polyphagia and polyuria.   Hematologic/Lymphatic: Negative for adenopathy and bleeding problem. Does not bruise/bleed easily.   Skin: Negative for color change, flushing, itching and rash.   Musculoskeletal: Positive for myalgias. Negative for muscle cramps, muscle weakness and stiffness.   Gastrointestinal: Positive for abdominal pain. Negative for anorexia, constipation, diarrhea, flatus, hematemesis, hematochezia, melena, nausea and vomiting.   Genitourinary: Positive for frequency. Negative for dysuria, hematuria and nocturia.   Neurological: Positive for headaches. Negative for focal weakness, loss of balance, numbness, paresthesias and seizures.   Psychiatric/Behavioral: Negative for altered mental status, hallucinations and suicidal ideas.   Allergic/Immunologic: Negative for HIV exposure, hives and persistent infections.           Current Outpatient Prescriptions:   •  allopurinol (ZYLOPRIM) 100 MG tablet, Take 1 tablet by mouth Daily., Disp: 90 tablet, Rfl: 3  •  aspirin 81 MG EC tablet, Take 1 tablet by mouth Daily., Disp: 30 tablet, Rfl: 11  •  atorvastatin (LIPITOR) 20 MG tablet, Take 1 tablet by mouth Every Night., Disp: 90 tablet, Rfl: 3  •  cholecalciferol (VITAMIN D3) 1000 UNITS tablet, Take 2 capsules by mouth daily., Disp: , Rfl:   •  doxazosin (CARDURA) 4 MG tablet, Take 1 tablet by mouth Daily., Disp: 90 tablet, Rfl: 3  •  levothyroxine (SYNTHROID, LEVOTHROID) 137 MCG  "tablet, Take 1 tablet by mouth Daily., Disp: 90 tablet, Rfl: 3  •  losartan (COZAAR) 100 MG tablet, Take 1 tablet by mouth Daily., Disp: 90 tablet, Rfl: 3  •  metFORMIN (GLUCOPHAGE) 500 MG tablet, Take 1 tablet by mouth Daily With Breakfast., Disp: 90 tablet, Rfl: 2  •  metoprolol succinate XL (TOPROL-XL) 50 MG 24 hr tablet, Take 1 tablet by mouth Daily., Disp: 90 tablet, Rfl: 3  •  Multiple Vitamin tablet, Take  by mouth daily., Disp: , Rfl:      Objective:     Physical Exam   Constitutional: He is oriented to person, place, and time. He appears well-developed and well-nourished.   HENT:   Head: Normocephalic and atraumatic.   Mouth/Throat: Oropharynx is clear and moist.   Eyes: Pupils are equal, round, and reactive to light. Conjunctivae and EOM are normal. No scleral icterus.   Neck: Normal range of motion. Neck supple. No JVD present. No tracheal deviation present. No thyromegaly present.   Cardiovascular: Normal rate, regular rhythm, S1 normal, S2 normal, normal heart sounds, intact distal pulses and normal pulses.  PMI is not displaced.  Exam reveals no gallop and no friction rub.    No murmur heard.  Pulmonary/Chest: Effort normal and breath sounds normal. No respiratory distress. He has no wheezes. He has no rales.   Abdominal: Soft. Bowel sounds are normal. He exhibits no distension and no mass. There is no tenderness. There is no rebound and no guarding.   Musculoskeletal: Normal range of motion. He exhibits no edema or deformity.   Neurological: He is alert and oriented to person, place, and time. He displays normal reflexes. No cranial nerve deficit. Coordination normal.   Skin: Skin is warm and dry. No rash noted. No erythema.   Psychiatric: He has a normal mood and affect. His behavior is normal. Thought content normal.     Blood pressure 112/86, pulse 96, resp. rate 18, height 175.3 cm (69.02\"), weight (!) 144 kg (318 lb 6.4 oz), SpO2 96 %.   Lab Review:       Assessment:         1. Essential " hypertension  Excellent blood pressure control.  - Adult Transthoracic Echo Complete W/ Cont if Necessary Per Protocol    2. Diabetes mellitus without complication (CMS/[facility])  Followed closely by his primary care provider.    3. Hyperlipidemia, unspecified hyperlipidemia type  Managed by his primary care provider.    4. Morbid obesity (CMS/[facility])  This is due to excess calories.  Body mass index is 47.  The patient does not participate in regular aerobic activities.  He has central obesity.  There is evidence of multiple comorbidities.    5. Precordial pain  The patient's chest discomfort has features both typical and atypical for coronary insufficiency.  The patient has never had an ischemic evaluation.  The patient has multiple risk factors for coronary artery disease.    - Treadmill Stress Test  - Adult Transthoracic Echo Complete W/ Cont if Necessary Per Protocol      ECG 12 Lead  Date/Time: 7/31/2018 10:31 AM  Performed by: GUS MARVIN  Authorized by: GUS MARVIN   Rhythm: sinus rhythm  Rate: normal  QRS axis: left  Clinical impression: abnormal ECG             Plan:       I have recommended a treadmill exercise stress test as well as an echocardiogram.  No changes in his medication therapy have been made at today's visit.  The importance of diet exercise and weight management have been reinforced with the patient.  Further recommendations will be predicated on the results of his outpatient testing.

## 2018-08-09 LAB
BH CV ECHO MEAS - % IVS THICK: 42.3 %
BH CV ECHO MEAS - % LVPW THICK: 105.6 %
BH CV ECHO MEAS - AO MAX PG (FULL): 1.2 MMHG
BH CV ECHO MEAS - AO MAX PG: 6 MMHG
BH CV ECHO MEAS - AO MEAN PG (FULL): 2 MMHG
BH CV ECHO MEAS - AO MEAN PG: 4 MMHG
BH CV ECHO MEAS - AO ROOT AREA: 8.6 CM^2
BH CV ECHO MEAS - AO ROOT DIAM: 3.3 CM
BH CV ECHO MEAS - AO V2 MAX: 126 CM/SEC
BH CV ECHO MEAS - AO V2 MEAN: 90.4 CM/SEC
BH CV ECHO MEAS - AO V2 VTI: 20.6 CM
BH CV ECHO MEAS - AVA(I,A): 3.8 CM^2
BH CV ECHO MEAS - AVA(I,D): 3.8 CM^2
BH CV ECHO MEAS - AVA(V,A): 3.6 CM^2
BH CV ECHO MEAS - AVA(V,D): 3.6 CM^2
BH CV ECHO MEAS - CONTRAST EF 4CH: 71.2 ML/M^2
BH CV ECHO MEAS - EDV(CUBED): 132.7 ML
BH CV ECHO MEAS - EDV(MOD-SP4): 170 ML
BH CV ECHO MEAS - EDV(TEICH): 123.8 ML
BH CV ECHO MEAS - EF(CUBED): 82.5 %
BH CV ECHO MEAS - EF(MOD-SP4): 71.2 %
BH CV ECHO MEAS - EF(TEICH): 75.1 %
BH CV ECHO MEAS - ESV(CUBED): 23.1 ML
BH CV ECHO MEAS - ESV(MOD-SP4): 49 ML
BH CV ECHO MEAS - ESV(TEICH): 30.9 ML
BH CV ECHO MEAS - FS: 44.1 %
BH CV ECHO MEAS - IVS/LVPW: 1.4
BH CV ECHO MEAS - IVSD: 1 CM
BH CV ECHO MEAS - IVSS: 1.9 CM
BH CV ECHO MEAS - LA DIMENSION: 3.7 CM
BH CV ECHO MEAS - LA/AO: 1.1
BH CV ECHO MEAS - LV IVRT: 0.13 SEC
BH CV ECHO MEAS - LV MASS(C)D: 213.9 GRAMS
BH CV ECHO MEAS - LV MASS(C)S: 215.1 GRAMS
BH CV ECHO MEAS - LV MAX PG: 4.8 MMHG
BH CV ECHO MEAS - LV MEAN PG: 2 MMHG
BH CV ECHO MEAS - LV V1 MAX: 110 CM/SEC
BH CV ECHO MEAS - LV V1 MEAN: 64.1 CM/SEC
BH CV ECHO MEAS - LV V1 VTI: 18.6 CM
BH CV ECHO MEAS - LVIDD: 5.1 CM
BH CV ECHO MEAS - LVIDS: 2.9 CM
BH CV ECHO MEAS - LVLD AP4: 9.3 CM
BH CV ECHO MEAS - LVLS AP4: 8 CM
BH CV ECHO MEAS - LVOT AREA (M): 4.2 CM^2
BH CV ECHO MEAS - LVOT AREA: 4.2 CM^2
BH CV ECHO MEAS - LVOT DIAM: 2.3 CM
BH CV ECHO MEAS - LVPWD: 0.9 CM
BH CV ECHO MEAS - LVPWS: 1.9 CM
BH CV ECHO MEAS - MV A MAX VEL: 46.1 CM/SEC
BH CV ECHO MEAS - MV DEC SLOPE: 328 CM/SEC^2
BH CV ECHO MEAS - MV DEC TIME: 0.23 SEC
BH CV ECHO MEAS - MV E MAX VEL: 74.3 CM/SEC
BH CV ECHO MEAS - MV E/A: 1.6
BH CV ECHO MEAS - MV P1/2T MAX VEL: 81.1 CM/SEC
BH CV ECHO MEAS - MV P1/2T: 72.4 MSEC
BH CV ECHO MEAS - MVA P1/2T LCG: 2.7 CM^2
BH CV ECHO MEAS - MVA(P1/2T): 3 CM^2
BH CV ECHO MEAS - PA MAX PG: 2.1 MMHG
BH CV ECHO MEAS - PA V2 MAX: 72.2 CM/SEC
BH CV ECHO MEAS - RAP SYSTOLE: 10 MMHG
BH CV ECHO MEAS - RVSP: 34 MMHG
BH CV ECHO MEAS - SV(AO): 176.2 ML
BH CV ECHO MEAS - SV(CUBED): 109.5 ML
BH CV ECHO MEAS - SV(LVOT): 77.3 ML
BH CV ECHO MEAS - SV(MOD-SP4): 121 ML
BH CV ECHO MEAS - SV(TEICH): 92.9 ML
BH CV ECHO MEAS - TR MAX VEL: 244 CM/SEC
BH CV ECHO MEAS - TV MAX PG: 0.68 MMHG
BH CV ECHO MEAS - TV V2 MAX: 41.3 CM/SEC
BH CV STRESS BP STAGE 1: NORMAL
BH CV STRESS BP STAGE 2: NORMAL
BH CV STRESS DURATION MIN STAGE 1: 3
BH CV STRESS DURATION MIN STAGE 2: 3
BH CV STRESS DURATION SEC STAGE 1: 0
BH CV STRESS DURATION SEC STAGE 2: 0
BH CV STRESS GRADE STAGE 1: 10
BH CV STRESS GRADE STAGE 2: 12
BH CV STRESS HR STAGE 1: 136
BH CV STRESS HR STAGE 2: 154
BH CV STRESS METS STAGE 1: 5
BH CV STRESS METS STAGE 2: 7.5
BH CV STRESS O2 STAGE 1: 90
BH CV STRESS O2 STAGE 2: 90
BH CV STRESS PROTOCOL 1: NORMAL
BH CV STRESS RECOVERY BP: NORMAL MMHG
BH CV STRESS RECOVERY HR: 99 BPM
BH CV STRESS RECOVERY O2: 98 %
BH CV STRESS SPEED STAGE 1: 1.7
BH CV STRESS SPEED STAGE 2: 2.5
BH CV STRESS STAGE 1: 1
BH CV STRESS STAGE 2: 2
LV EF 2D ECHO EST: 68 %
MAXIMAL PREDICTED HEART RATE: 160 BPM
PERCENT MAX PREDICTED HR: 96.25 %
STRESS BASELINE BP: NORMAL MMHG
STRESS BASELINE HR: 79 BPM
STRESS O2 SAT REST: 100 %
STRESS PERCENT HR: 113 %
STRESS POST ESTIMATED WORKLOAD: 7 METS
STRESS POST EXERCISE DUR MIN: 5 MIN
STRESS POST O2 SAT PEAK: 90 %
STRESS POST PEAK BP: NORMAL MMHG
STRESS POST PEAK HR: 154 BPM
STRESS TARGET HR: 136 BPM

## 2018-09-11 ENCOUNTER — OFFICE VISIT (OUTPATIENT)
Dept: CARDIOLOGY | Facility: CLINIC | Age: 58
End: 2018-09-11

## 2018-09-11 VITALS
BODY MASS INDEX: 46.65 KG/M2 | HEART RATE: 90 BPM | WEIGHT: 315 LBS | DIASTOLIC BLOOD PRESSURE: 80 MMHG | RESPIRATION RATE: 18 BRPM | HEIGHT: 69 IN | OXYGEN SATURATION: 98 % | SYSTOLIC BLOOD PRESSURE: 110 MMHG

## 2018-09-11 DIAGNOSIS — R07.2 PRECORDIAL PAIN: ICD-10-CM

## 2018-09-11 DIAGNOSIS — E66.01 MORBID OBESITY (HCC): ICD-10-CM

## 2018-09-11 DIAGNOSIS — I10 BENIGN ESSENTIAL HYPERTENSION: Primary | ICD-10-CM

## 2018-09-11 PROCEDURE — 99214 OFFICE O/P EST MOD 30 MIN: CPT | Performed by: INTERNAL MEDICINE

## 2018-09-11 NOTE — PROGRESS NOTES
Subjective:     Encounter Date:09/11/2018      Patient ID: Miller Em is a 58 y.o. male.    Chief Complaint: Chest pain  HPI  This is a 58-year-old male patient who recently underwent treadmill exercise stress testing which was negative for ischemia.  The patient exercised to his target heart rate without chest discomfort shortness of breath or ischemic EKG changes.  His exercise tolerance was moderate to severely diminished as the patient is aerobically deconditioned with morbid obesity.  His heart rate and blood pressure response to exercise was normal and there was no exercise-induced arrhythmia.  The patient also underwent an echocardiogram which was normal. The echocardiogram showed no evidence of ventricular hypertrophy or cardiac chamber enlargement.  Left ventricular systolic and diastolic function was normal.  There was no evidence of valvular pathology of significance, pericardial disease, pulmonary hypertension or intracardiac shunting.  The left ventricular ejection fraction was normal and there were no regional wall motion abnormalities.  The patient indicates that he has had one episode of chest discomfort which occurred at night while at rest since his initial evaluation.  This had a throbbing sensation localized to a discrete area of his upper central sternum and resolve spontaneously.  There was no exertional or pleuritic component.  There was no associated shortness of breath nausea vomiting or diaphoresis.  The discomfort had a 3-4/10 in intensity and lasted for approximately 5 minutes.  He cannot identify any precipitating aggravating or alleviating features.  There is no orthopnea PND or lower extremity edema.  He has no dizziness palpitations or syncope.  The patient has gained 4 pounds since his last visit.  He remains a nonsmoker.    The following portions of the patient's history were reviewed and updated as appropriate: allergies, current medications, past family history, past  medical history, past social history, past surgical history and problem  Review of Systems   Constitution: Negative for chills, diaphoresis, fever, weakness, malaise/fatigue, weight gain and weight loss.   HENT: Negative for ear discharge, hearing loss, hoarse voice and nosebleeds.    Eyes: Negative for discharge, double vision, pain and photophobia.   Cardiovascular: Positive for chest pain. Negative for claudication, cyanosis, dyspnea on exertion, irregular heartbeat, leg swelling, near-syncope, orthopnea, palpitations, paroxysmal nocturnal dyspnea and syncope.   Respiratory: Negative for cough, hemoptysis, shortness of breath, sputum production and wheezing.    Endocrine: Negative for cold intolerance, heat intolerance, polydipsia, polyphagia and polyuria.   Hematologic/Lymphatic: Negative for adenopathy and bleeding problem. Does not bruise/bleed easily.   Skin: Negative for color change, flushing, itching and rash.   Musculoskeletal: Negative for muscle cramps, muscle weakness, myalgias and stiffness.   Gastrointestinal: Negative for abdominal pain, diarrhea, hematemesis, hematochezia, nausea and vomiting.   Genitourinary: Negative for dysuria, frequency and nocturia.   Neurological: Negative for focal weakness, loss of balance, numbness, paresthesias and seizures.   Psychiatric/Behavioral: Negative for altered mental status, hallucinations and suicidal ideas.   Allergic/Immunologic: Negative for HIV exposure, hives and persistent infections.           Current Outpatient Prescriptions:   •  allopurinol (ZYLOPRIM) 100 MG tablet, Take 1 tablet by mouth Daily., Disp: 90 tablet, Rfl: 3  •  aspirin 81 MG EC tablet, Take 1 tablet by mouth Daily., Disp: 30 tablet, Rfl: 11  •  atorvastatin (LIPITOR) 20 MG tablet, Take 1 tablet by mouth Every Night., Disp: 90 tablet, Rfl: 3  •  cholecalciferol (VITAMIN D3) 1000 UNITS tablet, Take 2 capsules by mouth daily., Disp: , Rfl:   •  doxazosin (CARDURA) 4 MG tablet, Take 1  tablet by mouth Daily., Disp: 90 tablet, Rfl: 3  •  levothyroxine (SYNTHROID, LEVOTHROID) 137 MCG tablet, Take 1 tablet by mouth Daily., Disp: 90 tablet, Rfl: 3  •  losartan (COZAAR) 100 MG tablet, Take 1 tablet by mouth Daily., Disp: 90 tablet, Rfl: 3  •  metFORMIN (GLUCOPHAGE) 500 MG tablet, Take 1 tablet by mouth Daily With Breakfast., Disp: 90 tablet, Rfl: 2  •  metoprolol succinate XL (TOPROL-XL) 50 MG 24 hr tablet, Take 1 tablet by mouth Daily., Disp: 90 tablet, Rfl: 3  •  Multiple Vitamin tablet, Take  by mouth daily., Disp: , Rfl:      Objective:     Physical Exam   Constitutional: He is oriented to person, place, and time. He appears well-developed and well-nourished. No distress.   HENT:   Head: Normocephalic and atraumatic.   Mouth/Throat: Oropharynx is clear and moist.   Eyes: Pupils are equal, round, and reactive to light. Conjunctivae and EOM are normal. No scleral icterus.   Neck: Normal range of motion. Neck supple. No JVD present. No tracheal deviation present. No thyromegaly present.   Cardiovascular: Normal rate, regular rhythm, S1 normal, S2 normal, normal heart sounds, intact distal pulses and normal pulses.  PMI is not displaced.  Exam reveals no gallop and no friction rub.    No murmur heard.  Pulmonary/Chest: Effort normal and breath sounds normal. No respiratory distress. He has no wheezes. He has no rales.   Abdominal: Soft. Bowel sounds are normal. He exhibits no distension and no mass. There is no tenderness. There is no rebound and no guarding.   Musculoskeletal: Normal range of motion. He exhibits no edema or deformity.   Neurological: He is alert and oriented to person, place, and time. He displays normal reflexes. No cranial nerve deficit. Coordination normal.   Skin: Skin is warm and dry. No rash noted. He is not diaphoretic. No erythema.   Psychiatric: He has a normal mood and affect. His behavior is normal. Thought content normal.     Blood pressure 110/80, pulse 90, resp. rate 18,  "height 175.3 cm (69.02\"), weight (!) 146 kg (322 lb), SpO2 98 %.   Lab Review:       Assessment:         1. Benign essential hypertension  Excellent blood pressure control.    2. Morbid obesity (CMS/HCC)  This is due to excess calorie intake and lack of aerobic activity.  The body mass index is greater than 47.  There is evidence of significant comorbidities.  The obesity pattern is truncal.  He has gained 4 pounds of weight since his last visit.    3. Precordial pain  Noncardiac chest pain.  No evidence of inducible ischemia or ischemic heart disease.  No evidence of structural heart disease.    Procedures     Plan:       Evaluate noncardiac causes of chest discomfort.  The importance of diet exercise and weight management have been reinforced with the patient.  No additional cardiovascular testing is indicated.  No changes in his medication therapy is warranted at this time.         "

## 2018-10-26 LAB
ALBUMIN SERPL-MCNC: 4.5 G/DL (ref 3.5–5)
ALBUMIN/GLOB SERPL: 1.7 G/DL (ref 1–2)
ALP SERPL-CCNC: 71 U/L (ref 38–126)
ALT SERPL-CCNC: 55 U/L (ref 13–69)
AST SERPL-CCNC: 32 U/L (ref 15–46)
BASOPHILS # BLD AUTO: 0.06 10*3/MM3 (ref 0–0.2)
BASOPHILS NFR BLD AUTO: 1 % (ref 0–2.5)
BILIRUB SERPL-MCNC: 0.4 MG/DL (ref 0.2–1.3)
BUN SERPL-MCNC: 12 MG/DL (ref 7–20)
BUN/CREAT SERPL: 10 (ref 6.3–21.9)
CALCIUM SERPL-MCNC: 11.4 MG/DL (ref 8.4–10.2)
CHLORIDE SERPL-SCNC: 108 MMOL/L (ref 98–107)
CHOLEST SERPL-MCNC: 133 MG/DL (ref 0–199)
CO2 SERPL-SCNC: 28 MMOL/L (ref 26–30)
CREAT SERPL-MCNC: 1.2 MG/DL (ref 0.6–1.3)
EOSINOPHIL # BLD AUTO: 0.14 10*3/MM3 (ref 0–0.7)
EOSINOPHIL NFR BLD AUTO: 2.4 % (ref 0–7)
ERYTHROCYTE [DISTWIDTH] IN BLOOD BY AUTOMATED COUNT: 17.6 % (ref 11.5–14.5)
GLOBULIN SER CALC-MCNC: 2.6 GM/DL
GLUCOSE SERPL-MCNC: 101 MG/DL (ref 74–98)
HBA1C MFR BLD: 7 %
HCT VFR BLD AUTO: 51.4 % (ref 42–52)
HDLC SERPL-MCNC: 38 MG/DL (ref 40–60)
HGB BLD-MCNC: 15.2 G/DL (ref 14–18)
IMM GRANULOCYTES # BLD: 0.03 10*3/MM3 (ref 0–0.06)
IMM GRANULOCYTES NFR BLD: 0.5 % (ref 0–0.6)
LDLC SERPL CALC-MCNC: 69 MG/DL (ref 0–99)
LYMPHOCYTES # BLD AUTO: 2.11 10*3/MM3 (ref 0.6–3.4)
LYMPHOCYTES NFR BLD AUTO: 36.3 % (ref 10–50)
MCH RBC QN AUTO: 26.7 PG (ref 27–31)
MCHC RBC AUTO-ENTMCNC: 29.6 G/DL (ref 30–37)
MCV RBC AUTO: 90.3 FL (ref 80–94)
MICROALBUMIN UR-MCNC: 32.1 UG/ML
MONOCYTES # BLD AUTO: 0.65 10*3/MM3 (ref 0–0.9)
MONOCYTES NFR BLD AUTO: 11.2 % (ref 0–12)
NEUTROPHILS # BLD AUTO: 2.82 10*3/MM3 (ref 2–6.9)
NEUTROPHILS NFR BLD AUTO: 48.6 % (ref 37–80)
NRBC BLD AUTO-RTO: 0 /100 WBC (ref 0–0)
PLATELET # BLD AUTO: 211 10*3/MM3 (ref 130–400)
POTASSIUM SERPL-SCNC: 4.5 MMOL/L (ref 3.5–5.1)
PROT SERPL-MCNC: 7.1 G/DL (ref 6.3–8.2)
RBC # BLD AUTO: 5.69 10*6/MM3 (ref 4.7–6.1)
SODIUM SERPL-SCNC: 141 MMOL/L (ref 137–145)
T4 FREE SERPL-MCNC: 1.37 NG/DL (ref 0.78–2.19)
TRIGL SERPL-MCNC: 129 MG/DL
TSH SERPL DL<=0.005 MIU/L-ACNC: 1.61 MIU/ML (ref 0.47–4.68)
VIT B12 SERPL-MCNC: 413 PG/ML (ref 239–931)
VLDLC SERPL CALC-MCNC: 25.8 MG/DL
WBC # BLD AUTO: 5.81 10*3/MM3 (ref 4.8–10.8)

## 2018-10-29 ENCOUNTER — OFFICE VISIT (OUTPATIENT)
Dept: INTERNAL MEDICINE | Facility: CLINIC | Age: 58
End: 2018-10-29

## 2018-10-29 VITALS
DIASTOLIC BLOOD PRESSURE: 69 MMHG | HEART RATE: 102 BPM | OXYGEN SATURATION: 100 % | TEMPERATURE: 98.1 F | WEIGHT: 315 LBS | HEIGHT: 69 IN | RESPIRATION RATE: 16 BRPM | SYSTOLIC BLOOD PRESSURE: 112 MMHG | BODY MASS INDEX: 46.65 KG/M2

## 2018-10-29 DIAGNOSIS — H15.002 SCLERITIS OF LEFT EYE: Primary | ICD-10-CM

## 2018-10-29 DIAGNOSIS — E21.3 HYPERPARATHYROIDISM (HCC): ICD-10-CM

## 2018-10-29 DIAGNOSIS — I10 ESSENTIAL HYPERTENSION: ICD-10-CM

## 2018-10-29 DIAGNOSIS — Z23 NEED FOR VACCINATION: ICD-10-CM

## 2018-10-29 DIAGNOSIS — K76.0 FATTY LIVER: ICD-10-CM

## 2018-10-29 DIAGNOSIS — E78.00 PURE HYPERCHOLESTEROLEMIA: ICD-10-CM

## 2018-10-29 DIAGNOSIS — E11.9 DIABETES MELLITUS WITHOUT COMPLICATION (HCC): ICD-10-CM

## 2018-10-29 PROCEDURE — 99214 OFFICE O/P EST MOD 30 MIN: CPT | Performed by: INTERNAL MEDICINE

## 2018-10-29 PROCEDURE — 90471 IMMUNIZATION ADMIN: CPT | Performed by: INTERNAL MEDICINE

## 2018-10-29 PROCEDURE — 90632 HEPA VACCINE ADULT IM: CPT | Performed by: INTERNAL MEDICINE

## 2018-10-29 NOTE — PROGRESS NOTES
Subjective     Patient ID: Miller Em is a 58 y.o. male. Patient is here for management of multiple medical problems.     Chief Complaint   Patient presents with   • Hypertension     3 month follow-up     History of Present Illness       Pt had Cataract repair and had bulge on left lateral eye.   Seen Dr Leiva. Seen a few days after surgery.  MD in [place].    Pt drinking beers 2-3 x a week.   Sweat tea.        The following portions of the patient's history were reviewed and updated as appropriate: allergies, current medications, past family history, past medical history, past social history, past surgical history and problem list.    Review of Systems   Constitutional: Negative for activity change and appetite change.   HENT: Negative for dental problem, ear discharge, facial swelling and hearing loss.    Gastrointestinal: Negative for anal bleeding and blood in stool.   All other systems reviewed and are negative.      Current Outpatient Prescriptions:   •  allopurinol (ZYLOPRIM) 100 MG tablet, Take 1 tablet by mouth Daily., Disp: 90 tablet, Rfl: 3  •  aspirin 81 MG EC tablet, Take 1 tablet by mouth Daily., Disp: 30 tablet, Rfl: 11  •  atorvastatin (LIPITOR) 20 MG tablet, Take 1 tablet by mouth Every Night., Disp: 90 tablet, Rfl: 3  •  cholecalciferol (VITAMIN D3) 1000 UNITS tablet, Take 2 capsules by mouth daily., Disp: , Rfl:   •  doxazosin (CARDURA) 4 MG tablet, Take 1 tablet by mouth Daily., Disp: 90 tablet, Rfl: 3  •  levothyroxine (SYNTHROID, LEVOTHROID) 137 MCG tablet, Take 1 tablet by mouth Daily., Disp: 90 tablet, Rfl: 3  •  losartan (COZAAR) 100 MG tablet, Take 1 tablet by mouth Daily., Disp: 90 tablet, Rfl: 3  •  metFORMIN (GLUCOPHAGE) 500 MG tablet, TAKE 1 TABLET DAILY WITH   BREAKFAST, Disp: 90 tablet, Rfl: 2  •  metoprolol succinate XL (TOPROL-XL) 50 MG 24 hr tablet, Take 1 tablet by mouth Daily., Disp: 90 tablet, Rfl: 3  •  Multiple Vitamin tablet, Take  by mouth daily., Disp: , Rfl:  "    Objective      Blood pressure 112/69, pulse 102, temperature 98.1 °F (36.7 °C), temperature source Oral, resp. rate 16, height 175.3 cm (69\"), weight (!) 151 kg (333 lb), SpO2 100 %.    Physical Exam     General Appearance:    Alert, cooperative, no distress, appears stated age   Head:    Normocephalic, without obvious abnormality, atraumatic   Eyes:    PERRL, conjunctiva/corneas clear, EOM's intact   Ears:    Normal TM's and external ear canals, both ears   Nose:   Nares normal, septum midline, mucosa normal, no drainage   or sinus tenderness   Throat:   Lips, mucosa, and tongue normal; teeth and gums normal   Neck:   Supple, symmetrical, trachea midline, no adenopathy;        thyroid:  No enlargement/tenderness/nodules; no carotid    bruit or JVD   Back:     Symmetric, no curvature, ROM normal, no CVA tenderness   Lungs:     Clear to auscultation bilaterally, respirations unlabored   Chest wall:    No tenderness or deformity   Heart:    Regular rate and rhythm, S1 and S2 normal, no murmur,        rub or gallop   Abdomen:     Soft, non-tender, bowel sounds active all four quadrants,     no masses, no organomegaly   Extremities:   Extremities normal, atraumatic, no cyanosis or edema   Pulses:   2+ and symmetric all extremities   Skin:   Skin color, texture, turgor normal, no rashes or lesions   Lymph nodes:   Cervical, supraclavicular, and axillary nodes normal   Neurologic:   CNII-XII intact. Normal strength, sensation and reflexes       throughout      Results for orders placed or performed in visit on 07/31/18   Treadmill Stress Test   Result Value Ref Range    Target HR (85%) 136 bpm    Max. Pred. HR (100%) 160 bpm     CV STRESS PROTOCOL 1 Vikash     Stage 1 1     HR Stage 1 136     BP Stage 1 154/98     O2 Stage 1 90     Duration Min Stage 1 3     Duration Sec Stage 1 0     Grade Stage 1 10     Speed Stage 1 1.7     BH CV STRESS METS STAGE 1 5     Stage 2 2     HR Stage 2 154     BP Stage 2 166/88     O2 " Stage 2 90     Duration Min Stage 2 3     Duration Sec Stage 2 0     Grade Stage 2 12     Speed Stage 2 2.5     BH CV STRESS METS STAGE 2 7.5     Baseline HR 79 bpm    Baseline /80 mmHg    O2 sat rest 100 %    Peak  bpm    Percent Max Pred HR 96.25 %    Percent Target  %    Peak /98 mmHg    O2 sat peak 90 %    Recovery HR 99 bpm    Recovery /82 mmHg    Recovery O2 98 %    Exercise duration (min) 5 min    Estimated workload 7.0 METS   Adult Transthoracic Echo Complete W/ Cont if Necessary Per Protocol   Result Value Ref Range    IVSd 1.0 cm    IVSs 1.9 cm    LVIDd 5.1 cm    LVIDs 2.9 cm    LVPWd 0.9 cm     CV ECHO CASSI - LVPWS 1.9 cm    IVS/LVPW 1.4     FS 44.1 %    EDV(Teich) 123.8 ml    ESV(Teich) 30.9 ml    EF(Teich) 75.1 %    EDV(cubed) 132.7 ml    ESV(cubed) 23.1 ml    EF(cubed) 82.5 %    % IVS thick 42.3 %    % LVPW thick 105.6 %    LV mass(C)d 213.9 grams    LV mass(C)s 215.1 grams    SV(Teich) 92.9 ml    SV(cubed) 109.5 ml    Ao root diam 3.3 cm    Ao root area 8.6 cm^2    LA dimension 3.7 cm    LA/Ao 1.1     LVOT diam 2.3 cm    LVOT area 4.2 cm^2    LVOT area(traced) 4.2 cm^2    LVLd ap4 9.3 cm    EDV(MOD-sp4) 170.0 ml    LVLs ap4 8.0 cm    ESV(MOD-sp4) 49.0 ml    EF(MOD-sp4) 71.2 %    SV(MOD-sp4) 121.0 ml    EF - Contrast (4Ch) 71.2 ml/m^2    MV E max yuli 74.3 cm/sec    MV A max yuli 46.1 cm/sec    MV E/A 1.6     LV IVRT 0.13 sec    MV P1/2t max yuli 81.1 cm/sec    MV P1/2t 72.4 msec    MVA(P1/2t) 3.0 cm^2    MV dec slope 328.0 cm/sec^2    MV dec time 0.23 sec    Ao pk yuli 126.0 cm/sec    Ao max PG 6.0 mmHg    Ao max PG (full) 1.2 mmHg    Ao V2 mean 90.4 cm/sec    Ao mean PG 4.0 mmHg    Ao mean PG (full) 2.0 mmHg    Ao V2 VTI 20.6 cm    FAUSTINA(I,A) 3.8 cm^2    FAUSTINA(I,D) 3.8 cm^2    FAUSTINA(V,A) 3.6 cm^2    FAUSTINA(V,D) 3.6 cm^2    LV V1 max PG 4.8 mmHg    LV V1 mean PG 2.0 mmHg    LV V1 max 110.0 cm/sec    LV V1 mean 64.1 cm/sec    LV V1 VTI 18.6 cm    SV(Ao) 176.2 ml    SV(LVOT) 77.3 ml     TV V2 max 41.3 cm/sec    TV max PG 0.68 mmHg    PA V2 max 72.2 cm/sec    PA max PG 2.1 mmHg    TR max yuli 244.0 cm/sec    RVSP(TR) 34.0 mmHg    RAP systole 10.0 mmHg    MVA P1/2T LCG 2.7 cm^2    Echo EF Estimated 68 %         Assessment/Plan     Stop sugary drinks. For fatty liver dz and dmt2.   Pt with sever fatty liver dz on last ct ab.      htn stable.    Miller was seen today for hypertension.    Diagnoses and all orders for this visit:    Scleritis of left eye  -     Ambulatory Referral to Ophthalmology    Fatty liver    Essential hypertension    Pure hypercholesterolemia    Hyperparathyroidism (CMS/HCC)  -     US Thyroid      Return in about 3 months (around 1/29/2019).          There are no Patient Instructions on file for this visit.     Praful Rapp MD    Assessment/Plan

## 2019-01-17 DIAGNOSIS — E03.9 ACQUIRED HYPOTHYROIDISM: ICD-10-CM

## 2019-01-17 DIAGNOSIS — E21.3 HYPERPARATHYROIDISM (HCC): ICD-10-CM

## 2019-01-17 DIAGNOSIS — I10 ESSENTIAL HYPERTENSION: ICD-10-CM

## 2019-01-17 DIAGNOSIS — E78.00 PURE HYPERCHOLESTEROLEMIA: ICD-10-CM

## 2019-01-17 RX ORDER — LEVOTHYROXINE SODIUM 137 MCG
TABLET ORAL
Qty: 90 TABLET | Refills: 3 | Status: SHIPPED | OUTPATIENT
Start: 2019-01-17 | End: 2019-07-10 | Stop reason: SDUPTHER

## 2019-01-17 RX ORDER — DOXAZOSIN MESYLATE 4 MG/1
TABLET ORAL
Qty: 90 TABLET | Refills: 3 | Status: SHIPPED | OUTPATIENT
Start: 2019-01-17 | End: 2020-06-12 | Stop reason: SDUPTHER

## 2019-01-17 RX ORDER — ALLOPURINOL 100 MG/1
TABLET ORAL
Qty: 90 TABLET | Refills: 3 | Status: SHIPPED | OUTPATIENT
Start: 2019-01-17 | End: 2020-06-12 | Stop reason: SDUPTHER

## 2019-01-17 RX ORDER — ATORVASTATIN CALCIUM 20 MG/1
TABLET, FILM COATED ORAL
Qty: 90 TABLET | Refills: 3 | Status: SHIPPED | OUTPATIENT
Start: 2019-01-17 | End: 2020-06-12 | Stop reason: SDUPTHER

## 2019-01-17 RX ORDER — LOSARTAN POTASSIUM 100 MG/1
TABLET ORAL
Qty: 90 TABLET | Refills: 3 | Status: SHIPPED | OUTPATIENT
Start: 2019-01-17 | End: 2020-06-12 | Stop reason: SDUPTHER

## 2019-02-07 ENCOUNTER — HOSPITAL ENCOUNTER (OUTPATIENT)
Dept: ULTRASOUND IMAGING | Facility: HOSPITAL | Age: 59
Discharge: HOME | End: 2019-02-07
Attending: INTERNAL MEDICINE | Admitting: INTERNAL MEDICINE

## 2019-02-07 PROCEDURE — 76536 US EXAM OF HEAD AND NECK: CPT

## 2019-02-09 LAB
ALBUMIN SERPL-MCNC: 4.4 G/DL (ref 3.5–5)
ALBUMIN/GLOB SERPL: 1.9 G/DL (ref 1–2)
ALP SERPL-CCNC: 79 U/L (ref 38–126)
ALT SERPL-CCNC: 42 U/L (ref 13–69)
AST SERPL-CCNC: 28 U/L (ref 15–46)
BASOPHILS # BLD AUTO: 0.07 10*3/MM3 (ref 0–0.2)
BASOPHILS NFR BLD AUTO: 1.2 % (ref 0–2.5)
BILIRUB SERPL-MCNC: 0.3 MG/DL (ref 0.2–1.3)
BUN SERPL-MCNC: 17 MG/DL (ref 7–20)
BUN/CREAT SERPL: 14.2 (ref 6.3–21.9)
CALCIUM SERPL-MCNC: 10.9 MG/DL (ref 8.4–10.2)
CHLORIDE SERPL-SCNC: 108 MMOL/L (ref 98–107)
CO2 SERPL-SCNC: 26 MMOL/L (ref 26–30)
CREAT SERPL-MCNC: 1.2 MG/DL (ref 0.6–1.3)
EOSINOPHIL # BLD AUTO: 0.18 10*3/MM3 (ref 0–0.7)
EOSINOPHIL NFR BLD AUTO: 3.1 % (ref 0–7)
ERYTHROCYTE [DISTWIDTH] IN BLOOD BY AUTOMATED COUNT: 17 % (ref 11.5–14.5)
GLOBULIN SER CALC-MCNC: 2.3 GM/DL
GLUCOSE SERPL-MCNC: 90 MG/DL (ref 74–98)
HBA1C MFR BLD: 7 %
HCT VFR BLD AUTO: 49.3 % (ref 42–52)
HGB BLD-MCNC: 15.1 G/DL (ref 14–18)
IMM GRANULOCYTES # BLD AUTO: 0.01 10*3/MM3 (ref 0–0.06)
IMM GRANULOCYTES NFR BLD AUTO: 0.2 % (ref 0–0.6)
LYMPHOCYTES # BLD AUTO: 2.28 10*3/MM3 (ref 0.6–3.4)
LYMPHOCYTES NFR BLD AUTO: 38.9 % (ref 10–50)
MCH RBC QN AUTO: 27.2 PG (ref 27–31)
MCHC RBC AUTO-ENTMCNC: 30.6 G/DL (ref 30–37)
MCV RBC AUTO: 88.7 FL (ref 80–94)
MONOCYTES # BLD AUTO: 0.83 10*3/MM3 (ref 0–0.9)
MONOCYTES NFR BLD AUTO: 14.2 % (ref 0–12)
NEUTROPHILS # BLD AUTO: 2.49 10*3/MM3 (ref 2–6.9)
NEUTROPHILS NFR BLD AUTO: 42.4 % (ref 37–80)
NRBC BLD AUTO-RTO: 0 /100 WBC (ref 0–0)
PLATELET # BLD AUTO: 220 10*3/MM3 (ref 130–400)
POTASSIUM SERPL-SCNC: 4.3 MMOL/L (ref 3.5–5.1)
PROT SERPL-MCNC: 6.7 G/DL (ref 6.3–8.2)
RBC # BLD AUTO: 5.56 10*6/MM3 (ref 4.7–6.1)
SODIUM SERPL-SCNC: 142 MMOL/L (ref 137–145)
VIT B12 SERPL-MCNC: 407 PG/ML (ref 239–931)
WBC # BLD AUTO: 5.86 10*3/MM3 (ref 4.8–10.8)

## 2019-02-13 ENCOUNTER — OFFICE VISIT (OUTPATIENT)
Dept: INTERNAL MEDICINE | Facility: CLINIC | Age: 59
End: 2019-02-13

## 2019-02-13 VITALS
OXYGEN SATURATION: 99 % | WEIGHT: 315 LBS | RESPIRATION RATE: 16 BRPM | DIASTOLIC BLOOD PRESSURE: 64 MMHG | TEMPERATURE: 97.9 F | SYSTOLIC BLOOD PRESSURE: 112 MMHG | BODY MASS INDEX: 46.65 KG/M2 | HEART RATE: 72 BPM | HEIGHT: 69 IN

## 2019-02-13 DIAGNOSIS — Z00.00 ROUTINE GENERAL MEDICAL EXAMINATION AT A HEALTH CARE FACILITY: ICD-10-CM

## 2019-02-13 DIAGNOSIS — E11.9 DIABETES MELLITUS WITHOUT COMPLICATION (HCC): ICD-10-CM

## 2019-02-13 DIAGNOSIS — N50.89 MASS OF RIGHT TESTICLE: Primary | ICD-10-CM

## 2019-02-13 DIAGNOSIS — Z12.5 PROSTATE CANCER SCREENING: ICD-10-CM

## 2019-02-13 DIAGNOSIS — I10 ESSENTIAL HYPERTENSION: ICD-10-CM

## 2019-02-13 DIAGNOSIS — M1A.9XX0 CHRONIC GOUT WITHOUT TOPHUS, UNSPECIFIED CAUSE, UNSPECIFIED SITE: ICD-10-CM

## 2019-02-13 DIAGNOSIS — E55.9 VITAMIN D DEFICIENCY: ICD-10-CM

## 2019-02-13 PROCEDURE — 90471 IMMUNIZATION ADMIN: CPT | Performed by: INTERNAL MEDICINE

## 2019-02-13 PROCEDURE — 90632 HEPA VACCINE ADULT IM: CPT | Performed by: INTERNAL MEDICINE

## 2019-02-13 PROCEDURE — 99396 PREV VISIT EST AGE 40-64: CPT | Performed by: INTERNAL MEDICINE

## 2019-02-13 NOTE — PROGRESS NOTES
Subjective     Patient ID: Miller Em is a 58 y.o. male. Patient is here for management of multiple medical problems.     Chief Complaint   Patient presents with   • Hypertension     follow-up   • Pain     patient requesting compounding gel for hand and wrist pain that he was given previously    • Testicle issues     patient states he found a lump on his right testicle the size of a butter bean x 3 weeks, not sore to the touch.   • Diabetes     patient has questions regarding the Ha1c results      Hypertension   This is a chronic problem. The problem is unchanged. The problem is controlled. Pertinent negatives include no chest pain or headaches. Current antihypertension treatment includes angiotensin blockers. The current treatment provides significant improvement. There are no compliance problems.  There is no history of angina, kidney disease, CAD/MI or CVA. There is no history of chronic renal disease or coarctation of the aorta.   Pain   This is a chronic problem. The current episode started yesterday. The problem occurs constantly. The problem has been resolved. Pertinent negatives include no abdominal pain, anorexia, arthralgias, chest pain, chills, congestion or headaches. The treatment provided moderate relief.   Diabetes   He presents for his follow-up diabetic visit. He has type 2 diabetes mellitus. His disease course has been stable. Pertinent negatives for hypoglycemia include no confusion or headaches. Pertinent negatives for diabetes include no chest pain. Symptoms are stable. Pertinent negatives for diabetic complications include no autonomic neuropathy, CVA or heart disease. Risk factors for coronary artery disease include diabetes mellitus, male sex, obesity and dyslipidemia. When asked about current treatments, none were reported. He is compliant with treatment some of the time. His weight is stable. When asked about meal planning, he reported none. His home blood glucose trend is increasing  rapidly. An ACE inhibitor/angiotensin II receptor blocker is being taken. Eye exam is current.        Right testicular mass noticed 3 weeks ago. No pain.  No increasing in size.    Pain gel help with wrist pain.  Pt now retired and that has helped with general aches and pain.  Pt will try to do better on health now retired.        The following portions of the patient's history were reviewed and updated as appropriate: allergies, current medications, past family history, past medical history, past social history, past surgical history and problem list.    Review of Systems   Constitutional: Negative for chills.   HENT: Negative for congestion.    Cardiovascular: Negative for chest pain.   Gastrointestinal: Negative for abdominal pain and anorexia.   Musculoskeletal: Negative for arthralgias.   Neurological: Negative for headaches.   Psychiatric/Behavioral: Negative for confusion.       Current Outpatient Medications:   •  allopurinol (ZYLOPRIM) 100 MG tablet, TAKE 1 TABLET DAILY, Disp: 90 tablet, Rfl: 3  •  aspirin 81 MG EC tablet, Take 1 tablet by mouth Daily., Disp: 30 tablet, Rfl: 11  •  atorvastatin (LIPITOR) 20 MG tablet, TAKE 1 TABLET EVERY NIGHT, Disp: 90 tablet, Rfl: 3  •  cholecalciferol (VITAMIN D3) 1000 UNITS tablet, Take 2 capsules by mouth daily., Disp: , Rfl:   •  doxazosin (CARDURA) 4 MG tablet, TAKE 1 TABLET DAILY, Disp: 90 tablet, Rfl: 3  •  losartan (COZAAR) 100 MG tablet, TAKE 1 TABLET DAILY, Disp: 90 tablet, Rfl: 3  •  metFORMIN (GLUCOPHAGE) 500 MG tablet, TAKE 1 TABLET DAILY WITH   BREAKFAST, Disp: 90 tablet, Rfl: 2  •  metoprolol succinate XL (TOPROL-XL) 50 MG 24 hr tablet, Take 1 tablet by mouth Daily., Disp: 90 tablet, Rfl: 3  •  Multiple Vitamin tablet, Take  by mouth daily., Disp: , Rfl:   •  SYNTHROID 137 MCG tablet, TAKE 1 TABLET DAILY, Disp: 90 tablet, Rfl: 3    Objective      Blood pressure 112/64, pulse 72, temperature 97.9 °F (36.6 °C), temperature source Oral, resp. rate 16, height  "175.3 cm (69\"), weight (!) 149 kg (328 lb), SpO2 99 %.    Physical Exam     General Appearance:    Alert, cooperative, no distress, appears stated age   Head:    Normocephalic, without obvious abnormality, atraumatic   Eyes:    PERRL, conjunctiva/corneas clear, EOM's intact   Ears:    Normal TM's and external ear canals, both ears   Nose:   Nares normal, septum midline, mucosa normal, no drainage   or sinus tenderness   Throat:   Lips, mucosa, and tongue normal; teeth and gums normal   Neck:   Supple, symmetrical, trachea midline, no adenopathy;        thyroid:  No enlargement/tenderness/nodules; no carotid    bruit or JVD   Back:     Symmetric, no curvature, ROM normal, no CVA tenderness   Lungs:     Clear to auscultation bilaterally, respirations unlabored   Chest wall:    No tenderness or deformity   Heart:    Regular rate and rhythm, S1 and S2 normal, no murmur,        rub or gallop   Abdomen:     Soft, non-tender, bowel sounds active all four quadrants,     no masses, no organomegaly   Extremities:   Extremities normal, atraumatic, no cyanosis or edema   Pulses:   2+ and symmetric all extremities   Skin:   Skin color, texture, turgor normal, no rashes or lesions   Lymph nodes:   Cervical, supraclavicular, and axillary nodes normal   Neurologic:   CNII-XII intact. Normal strength, sensation and reflexes       throughout      Right posterior testicular mass.  2x3 cm.        Results for orders placed or performed in visit on 10/29/18   Vitamin B12   Result Value Ref Range    Vitamin B-12 407 239 - 931 pg/mL   Comprehensive Metabolic Panel   Result Value Ref Range    Glucose 90 74 - 98 mg/dL    BUN 17 7 - 20 mg/dL    Creatinine 1.20 0.60 - 1.30 mg/dL    eGFR Non African Am 62 >60 mL/min/1.73    eGFR African Am 75 >60 mL/min/1.73    BUN/Creatinine Ratio 14.2 6.3 - 21.9    Sodium 142 137 - 145 mmol/L    Potassium 4.3 3.5 - 5.1 mmol/L    Chloride 108 (H) 98 - 107 mmol/L    Total CO2 26.0 26.0 - 30.0 mmol/L    Calcium " 10.9 (H) 8.4 - 10.2 mg/dL    Total Protein 6.7 6.3 - 8.2 g/dL    Albumin 4.40 3.50 - 5.00 g/dL    Globulin 2.3 gm/dL    A/G Ratio 1.9 1.0 - 2.0 g/dL    Total Bilirubin 0.3 0.2 - 1.3 mg/dL    Alkaline Phosphatase 79 38 - 126 U/L    AST (SGOT) 28 15 - 46 U/L    ALT (SGPT) 42 13 - 69 U/L   Hemoglobin A1c   Result Value Ref Range    Hemoglobin A1C 7.00 %   CBC & Differential   Result Value Ref Range    WBC 5.86 4.80 - 10.80 10*3/mm3    RBC 5.56 4.70 - 6.10 10*6/mm3    Hemoglobin 15.1 14.0 - 18.0 g/dL    Hematocrit 49.3 42.0 - 52.0 %    MCV 88.7 80.0 - 94.0 fL    MCH 27.2 27.0 - 31.0 pg    MCHC 30.6 30.0 - 37.0 g/dL    RDW 17.0 (H) 11.5 - 14.5 %    Platelets 220 130 - 400 10*3/mm3    Neutrophil Rel % 42.4 37.0 - 80.0 %    Lymphocyte Rel % 38.9 10.0 - 50.0 %    Monocyte Rel % 14.2 (H) 0.0 - 12.0 %    Eosinophil Rel % 3.1 0.0 - 7.0 %    Basophil Rel % 1.2 0.0 - 2.5 %    Neutrophils Absolute 2.49 2.00 - 6.90 10*3/mm3    Lymphocytes Absolute 2.28 0.60 - 3.40 10*3/mm3    Monocytes Absolute 0.83 0.00 - 0.90 10*3/mm3    Eosinophils Absolute 0.18 0.00 - 0.70 10*3/mm3    Basophils Absolute 0.07 0.00 - 0.20 10*3/mm3    Immature Granulocyte Rel % 0.2 0.0 - 0.6 %    Immature Grans Absolute 0.01 0.00 - 0.06 10*3/mm3    nRBC 0.0 0.0 - 0.0 /100 WBC         Assessment/Plan   ha1c no change. Still 7.0  discussed low carb diet.  Keto diet recommend.    Wt loss and exercise recommended.          Miller was seen today for hypertension, pain, testicle issues and diabetes.    Diagnoses and all orders for this visit:    Mass of right testicle  -     Ambulatory Referral to Urology  -     US Testicular or Ovarian Vascular Complete; Future  -     Lipid Panel  -     CBC & Differential  -     Vitamin B12  -     Comprehensive Metabolic Panel  -     TSH  -     T4, Free  -     Hemoglobin A1c  -     MicroAlbumin, Urine, Random - Urine, Clean Catch    Essential hypertension  -     Lipid Panel  -     CBC & Differential  -     Vitamin B12  -      Comprehensive Metabolic Panel  -     TSH  -     T4, Free  -     Hemoglobin A1c  -     MicroAlbumin, Urine, Random - Urine, Clean Catch    Vitamin D deficiency  -     Lipid Panel  -     CBC & Differential  -     Vitamin B12  -     Comprehensive Metabolic Panel  -     TSH  -     T4, Free  -     Hemoglobin A1c  -     MicroAlbumin, Urine, Random - Urine, Clean Catch  -     Vitamin D 25 Hydroxy    Diabetes mellitus without complication (CMS/HCC)  -     Lipid Panel  -     CBC & Differential  -     Vitamin B12  -     Comprehensive Metabolic Panel  -     TSH  -     T4, Free  -     Hemoglobin A1c  -     MicroAlbumin, Urine, Random - Urine, Clean Catch    Chronic gout without tophus, unspecified cause, unspecified site  -     Lipid Panel  -     CBC & Differential  -     Vitamin B12  -     Comprehensive Metabolic Panel  -     TSH  -     T4, Free  -     Hemoglobin A1c  -     MicroAlbumin, Urine, Random - Urine, Clean Catch  -     Uric Acid    Routine general medical examination at a health care facility  -     Lipid Panel  -     CBC & Differential  -     Vitamin B12  -     Comprehensive Metabolic Panel  -     TSH  -     T4, Free  -     Hemoglobin A1c  -     MicroAlbumin, Urine, Random - Urine, Clean Catch    Prostate cancer screening    Other orders  -     Hepatitis A Vaccine Adult IM      Return in about 3 months (around 5/13/2019).          There are no Patient Instructions on file for this visit.     Praful Rapp MD    Assessment/Plan

## 2019-02-20 ENCOUNTER — HOSPITAL ENCOUNTER (OUTPATIENT)
Dept: ULTRASOUND IMAGING | Facility: HOSPITAL | Age: 59
Discharge: HOME | End: 2019-02-20
Admitting: INTERNAL MEDICINE

## 2019-02-20 DIAGNOSIS — N50.89 MASS OF RIGHT TESTICLE: ICD-10-CM

## 2019-02-20 PROCEDURE — 76870 US EXAM SCROTUM: CPT

## 2019-02-20 NOTE — PROGRESS NOTES
Please let them know the us show a cyst. Ok to keep apt and make sure the data lines up with the problem.

## 2019-03-07 ENCOUNTER — OFFICE VISIT (OUTPATIENT)
Dept: UROLOGY | Facility: CLINIC | Age: 59
End: 2019-03-07

## 2019-03-07 VITALS
OXYGEN SATURATION: 93 % | HEIGHT: 69 IN | WEIGHT: 315 LBS | HEART RATE: 73 BPM | BODY MASS INDEX: 46.65 KG/M2 | TEMPERATURE: 98 F

## 2019-03-07 DIAGNOSIS — N50.3 EPIDIDYMAL CYST: Primary | ICD-10-CM

## 2019-03-07 PROCEDURE — 99243 OFF/OP CNSLTJ NEW/EST LOW 30: CPT | Performed by: UROLOGY

## 2019-03-07 NOTE — PROGRESS NOTES
Chief Complaint  Right epididymal cyst    Referring Provider  Praful Rapp MD    HPI  Mr. Em is a 58 y.o. male with history of diabetes and obesity who presents with a long-standing swelling above the right testicle, which was diagnosed as a right epididymal cyst on scrotal ultrasound recently.  He denies any pain.    Other urologic history:   Nocturia 3-4x  Has known CPAP, but doesn't use it  no History of vasectomy  no History of kidney stones, recent trauma or injury.  no History of constipation        Past Medical History  Past Medical History:   Diagnosis Date   • Diabetes (CMS/HCC)    • Diabetes mellitus (CMS/HCC)    • Gout    • Hyperlipidemia    • Hypertension    • Thyroid condition        Past Surgical History  Past Surgical History:   Procedure Laterality Date   • EYE SURGERY      Cataract   • KNEE ARTHROSCOPY         Medications    Current Outpatient Medications:   •  allopurinol (ZYLOPRIM) 100 MG tablet, TAKE 1 TABLET DAILY, Disp: 90 tablet, Rfl: 3  •  aspirin 81 MG EC tablet, Take 1 tablet by mouth Daily., Disp: 30 tablet, Rfl: 11  •  atorvastatin (LIPITOR) 20 MG tablet, TAKE 1 TABLET EVERY NIGHT, Disp: 90 tablet, Rfl: 3  •  cholecalciferol (VITAMIN D3) 1000 UNITS tablet, Take 2 capsules by mouth daily., Disp: , Rfl:   •  doxazosin (CARDURA) 4 MG tablet, TAKE 1 TABLET DAILY, Disp: 90 tablet, Rfl: 3  •  losartan (COZAAR) 100 MG tablet, TAKE 1 TABLET DAILY, Disp: 90 tablet, Rfl: 3  •  metFORMIN (GLUCOPHAGE) 500 MG tablet, TAKE 1 TABLET DAILY WITH   BREAKFAST, Disp: 90 tablet, Rfl: 2  •  metoprolol succinate XL (TOPROL-XL) 50 MG 24 hr tablet, Take 1 tablet by mouth Daily., Disp: 90 tablet, Rfl: 3  •  Multiple Vitamin tablet, Take  by mouth daily., Disp: , Rfl:   •  SYNTHROID 137 MCG tablet, TAKE 1 TABLET DAILY, Disp: 90 tablet, Rfl: 3    Allergies  Allergies   Allergen Reactions   • Floxin [Ofloxacin] Hives   • Lisinopril Cough       Social History  Social History     Socioeconomic History   •  "Marital status: Single     Spouse name: Not on file   • Number of children: Not on file   • Years of education: Not on file   • Highest education level: Not on file   Social Needs   • Financial resource strain: Not on file   • Food insecurity - worry: Not on file   • Food insecurity - inability: Not on file   • Transportation needs - medical: Not on file   • Transportation needs - non-medical: Not on file   Occupational History   • Not on file   Tobacco Use   • Smoking status: Former Smoker     Years: 5.00     Types: Cigarettes     Last attempt to quit:      Years since quittin.1   • Smokeless tobacco: Never Used   • Tobacco comment: on and off x 10-15 years 1 pack per week   Substance and Sexual Activity   • Alcohol use: Yes     Comment: 1-2 wkly   • Drug use: No   • Sexual activity: Defer   Other Topics Concern   • Not on file   Social History Narrative   • Not on file       Family History  He has no family history of kidney stones    Review of Systems  Constitutional: No fevers or chills  Skin: Negative for rash  Endocrine: No heat/cold intolerance   Cardiovascular: Negative for chest pain or dyspnea on exertion  Respiratory: Negative for shortness of breath or wheezing  Gastrointestinal: No nausea or vomiting  Genitourinary: Negative for new lower urinary tract symptoms, current gross hematuria or dysuria.  Musculoskeletal: No flank pain  Neurological:  Negative for frequent headaches or dizziness  Lymph/Heme: Negative for leg swelling or calf pain.    Physical Exam  Visit Vitals  Pulse 73   Temp 98 °F (36.7 °C)   Ht 175.3 cm (69.02\")   Wt (!) 149 kg (328 lb)   SpO2 93%   BMI 48.41 kg/m²     Constitutional: NAD, WDWN.   HEENT: NCAT. Conjunctivae normal.  MMM.    Cardiovascular: Regular rate.  Pulmonary/Chest: Respirations are even and non-labored bilaterally.  Abdominal: Soft. No distension, tenderness, masses or guarding. No CVA tenderness.  Obese abdomen with pannus  Neurological: A + O x 3.  Cranial " Nerves II-XII grossly intact. Normal gait.  Extremities: CONCEPCION x 4, Warm. No clubbing.  No cyanosis.    Skin: Pink, warm and dry.  No rashes noted.  Psychiatric:  Normal mood and affect    Genitourinary  Penis: circumcised penis, glans normal, no penile discharge.  No rashes/lesions.    Testes: descended bilaterally, no masses, LEFT epididymis nontender to palpation,  RIGHT epididymis nontender to palpation with medium sized epididymal cyst palpable. No varicocele palpated. No hernia appreciated.    Labs  Brief Urine Lab Results     None          Lab Results   Component Value Date    GLUCOSE 95 09/19/2016    CALCIUM 10.9 (H) 02/08/2019     02/08/2019    K 4.3 02/08/2019    CO2 26.0 02/08/2019     (H) 02/08/2019    BUN 17 02/08/2019    CREATININE 1.20 02/08/2019    EGFRIFAFRI 75 02/08/2019    EGFRIFNONA 62 02/08/2019    BCR 14.2 02/08/2019    ANIONGAP 11.0 09/19/2016     Lab Results   Component Value Date    PSA 1.26 03/09/2015    PSA 1.34 11/13/2014       Radiologic Studies  Us Scrotum & Testicles  Result Date: 2/20/2019  1.5 cm right epididymal head cyst with no evidence of an intratesticular mass or torsion.    This report was finalized on 2/20/2019 12:55 PM by Candice Daily M.D..      Assessment  Mr. Em is a 58 y.o. male who presents with  right epididymal head cyst.  It is not painful and does not bother him.     Plan  1.  FU PRN if nocturia not improved with using CPAP      No Follow-up on file.    Vasyl Espinoza MD

## 2019-04-09 RX ORDER — METOPROLOL SUCCINATE 50 MG/1
TABLET, EXTENDED RELEASE ORAL
Qty: 90 TABLET | Refills: 3 | Status: SHIPPED | OUTPATIENT
Start: 2019-04-09 | End: 2020-06-12 | Stop reason: SDUPTHER

## 2019-07-01 LAB
25(OH)D3+25(OH)D2 SERPL-MCNC: 31.6 NG/ML
PSA SERPL-MCNC: 1.57 NG/ML (ref 0.06–4)
URATE SERPL-MCNC: 6.5 MG/DL (ref 2.5–8.5)

## 2019-07-02 LAB
ALBUMIN SERPL-MCNC: 4.3 G/DL (ref 3.5–5)
ALBUMIN/GLOB SERPL: 1.6 G/DL (ref 1–2)
ALP SERPL-CCNC: 59 U/L (ref 38–126)
ALT SERPL-CCNC: 48 U/L (ref 13–69)
AST SERPL-CCNC: 33 U/L (ref 15–46)
BASOPHILS # BLD AUTO: 0.04 10*3/MM3 (ref 0–0.2)
BASOPHILS NFR BLD AUTO: 0.7 % (ref 0–1.5)
BILIRUB SERPL-MCNC: 0.5 MG/DL (ref 0.2–1.3)
BUN SERPL-MCNC: 15 MG/DL (ref 7–20)
BUN/CREAT SERPL: 12.5 (ref 6.3–21.9)
CALCIUM SERPL-MCNC: 11.1 MG/DL (ref 8.4–10.2)
CHLORIDE SERPL-SCNC: 107 MMOL/L (ref 98–107)
CHOLEST SERPL-MCNC: 133 MG/DL (ref 0–199)
CO2 SERPL-SCNC: 24 MMOL/L (ref 26–30)
CREAT SERPL-MCNC: 1.2 MG/DL (ref 0.6–1.3)
EOSINOPHIL # BLD AUTO: 0.11 10*3/MM3 (ref 0–0.4)
EOSINOPHIL NFR BLD AUTO: 1.8 % (ref 0.3–6.2)
ERYTHROCYTE [DISTWIDTH] IN BLOOD BY AUTOMATED COUNT: 18.6 % (ref 12.3–15.4)
GLOBULIN SER CALC-MCNC: 2.7 GM/DL
GLUCOSE SERPL-MCNC: 99 MG/DL (ref 74–98)
HBA1C MFR BLD: 6.7 % (ref 4.8–5.6)
HCT VFR BLD AUTO: 54.2 % (ref 37.5–51)
HDLC SERPL-MCNC: 34 MG/DL (ref 40–60)
HGB BLD-MCNC: 16 G/DL (ref 13–17.7)
IMM GRANULOCYTES # BLD AUTO: 0.04 10*3/MM3 (ref 0–0.05)
IMM GRANULOCYTES NFR BLD AUTO: 0.7 % (ref 0–0.5)
LDLC SERPL CALC-MCNC: 71 MG/DL (ref 0–99)
LYMPHOCYTES # BLD AUTO: 2.34 10*3/MM3 (ref 0.7–3.1)
LYMPHOCYTES NFR BLD AUTO: 38.9 % (ref 19.6–45.3)
MCH RBC QN AUTO: 26.3 PG (ref 26.6–33)
MCHC RBC AUTO-ENTMCNC: 29.5 G/DL (ref 31.5–35.7)
MCV RBC AUTO: 89.1 FL (ref 79–97)
MICROALBUMIN UR-MCNC: 15.1 UG/ML
MONOCYTES # BLD AUTO: 0.62 10*3/MM3 (ref 0.1–0.9)
MONOCYTES NFR BLD AUTO: 10.3 % (ref 5–12)
NEUTROPHILS # BLD AUTO: 2.86 10*3/MM3 (ref 1.7–7)
NEUTROPHILS NFR BLD AUTO: 47.6 % (ref 42.7–76)
NRBC BLD AUTO-RTO: 0 /100 WBC (ref 0–0.2)
PLATELET # BLD AUTO: 204 10*3/MM3 (ref 140–450)
POTASSIUM SERPL-SCNC: 5.2 MMOL/L (ref 3.5–5.1)
PROT SERPL-MCNC: 7 G/DL (ref 6.3–8.2)
RBC # BLD AUTO: 6.08 10*6/MM3 (ref 4.14–5.8)
SODIUM SERPL-SCNC: 141 MMOL/L (ref 137–145)
T4 FREE SERPL-MCNC: 1.54 NG/DL (ref 0.78–2.19)
TRIGL SERPL-MCNC: 140 MG/DL
TSH SERPL DL<=0.005 MIU/L-ACNC: 2.13 MIU/ML (ref 0.47–4.68)
VIT B12 SERPL-MCNC: 411 PG/ML (ref 239–931)
VLDLC SERPL CALC-MCNC: 28 MG/DL
WBC # BLD AUTO: 6.01 10*3/MM3 (ref 3.4–10.8)

## 2019-07-10 ENCOUNTER — OFFICE VISIT (OUTPATIENT)
Dept: INTERNAL MEDICINE | Facility: CLINIC | Age: 59
End: 2019-07-10

## 2019-07-10 VITALS
RESPIRATION RATE: 16 BRPM | HEIGHT: 69 IN | DIASTOLIC BLOOD PRESSURE: 84 MMHG | HEART RATE: 69 BPM | WEIGHT: 315 LBS | BODY MASS INDEX: 46.65 KG/M2 | OXYGEN SATURATION: 100 % | TEMPERATURE: 98 F | SYSTOLIC BLOOD PRESSURE: 117 MMHG

## 2019-07-10 DIAGNOSIS — E78.00 PURE HYPERCHOLESTEROLEMIA: ICD-10-CM

## 2019-07-10 DIAGNOSIS — I10 ESSENTIAL HYPERTENSION: ICD-10-CM

## 2019-07-10 DIAGNOSIS — E66.01 MORBID OBESITY (HCC): ICD-10-CM

## 2019-07-10 DIAGNOSIS — I10 BENIGN ESSENTIAL HYPERTENSION: Primary | ICD-10-CM

## 2019-07-10 DIAGNOSIS — E03.9 ACQUIRED HYPOTHYROIDISM: ICD-10-CM

## 2019-07-10 PROCEDURE — 99214 OFFICE O/P EST MOD 30 MIN: CPT | Performed by: INTERNAL MEDICINE

## 2019-07-10 RX ORDER — LEVOTHYROXINE SODIUM 0.15 MG/1
137 TABLET ORAL DAILY
Qty: 90 TABLET | Refills: 3 | Status: SHIPPED | OUTPATIENT
Start: 2019-07-10 | End: 2020-05-22 | Stop reason: SDUPTHER

## 2019-07-10 NOTE — PROGRESS NOTES
Subjective     Patient ID: Miller Em is a 59 y.o. male. Patient is here for management of multiple medical problems.     Chief Complaint   Patient presents with   • Hypertension     follow-up   • Diabetes     follow-up     Hypertension   This is a chronic problem. The current episode started more than 1 year ago. The problem has been rapidly improving since onset. The problem is controlled. Pertinent negatives include no blurred vision, chest pain or headaches. There are no associated agents to hypertension. Current antihypertension treatment includes angiotensin blockers and beta blockers. The current treatment provides moderate improvement. There are no compliance problems.  There is no history of CVA.   Diabetes   He presents for his follow-up diabetic visit. He has type 2 diabetes mellitus. His disease course has been stable. Pertinent negatives for hypoglycemia include no confusion, dizziness or headaches. Pertinent negatives for diabetes include no blurred vision, no chest pain, no fatigue and no foot paresthesias. Symptoms are stable. Pertinent negatives for diabetic complications include no autonomic neuropathy, CVA or heart disease. Risk factors for coronary artery disease include diabetes mellitus and sedentary lifestyle. Current diabetic treatment includes oral agent (monotherapy). He is compliant with treatment some of the time.   Hyperlipidemia   This is a chronic problem. The problem is controlled. Recent lipid tests were reviewed and are normal. There are no known factors aggravating his hyperlipidemia. Pertinent negatives include no chest pain. Current antihyperlipidemic treatment includes statins.        The following portions of the patient's history were reviewed and updated as appropriate: allergies, current medications, past family history, past medical history, past social history, past surgical history and problem list.    Review of Systems   Constitutional: Negative for fatigue.   Eyes:  "Negative for blurred vision.   Cardiovascular: Negative for chest pain.   Neurological: Negative for dizziness and headaches.   Psychiatric/Behavioral: Negative for confusion.   All other systems reviewed and are negative.      Current Outpatient Medications:   •  allopurinol (ZYLOPRIM) 100 MG tablet, TAKE 1 TABLET DAILY, Disp: 90 tablet, Rfl: 3  •  aspirin 81 MG EC tablet, Take 1 tablet by mouth Daily., Disp: 30 tablet, Rfl: 11  •  atorvastatin (LIPITOR) 20 MG tablet, TAKE 1 TABLET EVERY NIGHT, Disp: 90 tablet, Rfl: 3  •  cholecalciferol (VITAMIN D3) 1000 UNITS tablet, Take 2 capsules by mouth daily., Disp: , Rfl:   •  doxazosin (CARDURA) 4 MG tablet, TAKE 1 TABLET DAILY, Disp: 90 tablet, Rfl: 3  •  levothyroxine (SYNTHROID, LEVOTHROID) 150 MCG tablet, Take 1 tablet by mouth Daily., Disp: 90 tablet, Rfl: 3  •  losartan (COZAAR) 100 MG tablet, TAKE 1 TABLET DAILY, Disp: 90 tablet, Rfl: 3  •  metFORMIN (GLUCOPHAGE) 500 MG tablet, TAKE 1 TABLET DAILY WITH   BREAKFAST, Disp: 90 tablet, Rfl: 2  •  metoprolol succinate XL (TOPROL-XL) 50 MG 24 hr tablet, TAKE 1 TABLET DAILY, Disp: 90 tablet, Rfl: 3  •  Multiple Vitamin tablet, Take  by mouth daily., Disp: , Rfl:     Objective      Blood pressure 117/84, pulse 69, temperature 98 °F (36.7 °C), temperature source Oral, resp. rate 16, height 175.3 cm (69\"), weight (!) 144 kg (317 lb), SpO2 100 %.    Physical Exam     General Appearance:    Alert, cooperative, no distress, appears stated age   Head:    Normocephalic, without obvious abnormality, atraumatic   Eyes:    PERRL, conjunctiva/corneas clear, EOM's intact   Ears:    Normal TM's and external ear canals, both ears   Nose:   Nares normal, septum midline, mucosa normal, no drainage   or sinus tenderness   Throat:   Lips, mucosa, and tongue normal; teeth and gums normal   Neck:   Supple, symmetrical, trachea midline, no adenopathy;        thyroid:  No enlargement/tenderness/nodules; no carotid    bruit or JVD   Back:     " Symmetric, no curvature, ROM normal, no CVA tenderness   Lungs:     Clear to auscultation bilaterally, respirations unlabored   Chest wall:    No tenderness or deformity   Heart:    Regular rate and rhythm, S1 and S2 normal, no murmur,        rub or gallop   Abdomen:     Soft, non-tender, bowel sounds active all four quadrants,     no masses, no organomegaly   Extremities:   Extremities normal, atraumatic, no cyanosis or edema   Pulses:   2+ and symmetric all extremities   Skin:   Skin color, texture, turgor normal, no rashes or lesions   Lymph nodes:   Cervical, supraclavicular, and axillary nodes normal   Neurologic:   CNII-XII intact. Normal strength, sensation and reflexes       throughout      Results for orders placed or performed in visit on 02/13/19   Lipid Panel   Result Value Ref Range    Total Cholesterol 133 0 - 199 mg/dL    Triglycerides 140 <150 mg/dL    HDL Cholesterol 34 (L) 40 - 60 mg/dL    VLDL Cholesterol 28 mg/dL    LDL Cholesterol  71 0 - 99 mg/dL   Vitamin B12   Result Value Ref Range    Vitamin B-12 411 239 - 931 pg/mL   Comprehensive Metabolic Panel   Result Value Ref Range    Glucose 99 (H) 74 - 98 mg/dL    BUN 15 7 - 20 mg/dL    Creatinine 1.20 0.60 - 1.30 mg/dL    eGFR Non African Am 62 >60 mL/min/1.73    eGFR African Am 75 >60 mL/min/1.73    BUN/Creatinine Ratio 12.5 6.3 - 21.9    Sodium 141 137 - 145 mmol/L    Potassium 5.2 (H) 3.5 - 5.1 mmol/L    Chloride 107 98 - 107 mmol/L    Total CO2 24.0 (L) 26.0 - 30.0 mmol/L    Calcium 11.1 (H) 8.4 - 10.2 mg/dL    Total Protein 7.0 6.3 - 8.2 g/dL    Albumin 4.30 3.50 - 5.00 g/dL    Globulin 2.7 gm/dL    A/G Ratio 1.6 1.0 - 2.0 g/dL    Total Bilirubin 0.5 0.2 - 1.3 mg/dL    Alkaline Phosphatase 59 38 - 126 U/L    AST (SGOT) 33 15 - 46 U/L    ALT (SGPT) 48 13 - 69 U/L   TSH   Result Value Ref Range    TSH 2.130 0.470 - 4.680 mIU/mL   T4, Free   Result Value Ref Range    Free T4 1.54 0.78 - 2.19 ng/dL   Hemoglobin A1c   Result Value Ref Range     Hemoglobin A1C 6.70 (H) 4.80 - 5.60 %   MicroAlbumin, Urine, Random - Urine, Clean Catch   Result Value Ref Range    Microalbumin, Urine 15.1 Not Estab. ug/mL   Uric Acid   Result Value Ref Range    Uric Acid 6.5 2.5 - 8.5 mg/dL   Vitamin D 25 Hydroxy   Result Value Ref Range    25 Hydroxy, Vitamin D 31.6 ng/ml   PSA Screen   Result Value Ref Range    PSA 1.570 0.060 - 4.000 ng/mL   CBC & Differential   Result Value Ref Range    WBC 6.01 3.40 - 10.80 10*3/mm3    RBC 6.08 (H) 4.14 - 5.80 10*6/mm3    Hemoglobin 16.0 13.0 - 17.7 g/dL    Hematocrit 54.2 (H) 37.5 - 51.0 %    MCV 89.1 79.0 - 97.0 fL    MCH 26.3 (L) 26.6 - 33.0 pg    MCHC 29.5 (L) 31.5 - 35.7 g/dL    RDW 18.6 (H) 12.3 - 15.4 %    Platelets 204 140 - 450 10*3/mm3    Neutrophil Rel % 47.6 42.7 - 76.0 %    Lymphocyte Rel % 38.9 19.6 - 45.3 %    Monocyte Rel % 10.3 5.0 - 12.0 %    Eosinophil Rel % 1.8 0.3 - 6.2 %    Basophil Rel % 0.7 0.0 - 1.5 %    Neutrophils Absolute 2.86 1.70 - 7.00 10*3/mm3    Lymphocytes Absolute 2.34 0.70 - 3.10 10*3/mm3    Monocytes Absolute 0.62 0.10 - 0.90 10*3/mm3    Eosinophils Absolute 0.11 0.00 - 0.40 10*3/mm3    Basophils Absolute 0.04 0.00 - 0.20 10*3/mm3    Immature Granulocyte Rel % 0.7 (H) 0.0 - 0.5 %    Immature Grans Absolute 0.04 0.00 - 0.05 10*3/mm3    nRBC 0.0 0.0 - 0.2 /100 WBC         Assessment/Plan   Pt riding bicycle and exercising more. Diet ging better no wt loss yet.    Pt will have increase in 150 on levothyroid.      Miller was seen today for hypertension and diabetes.    Diagnoses and all orders for this visit:    Benign essential hypertension  -     Comprehensive Metabolic Panel  -     TSH  -     T4, Free  -     Vitamin B12  -     CBC & Differential  -     Hemoglobin A1c    Morbid obesity (CMS/HCC)  -     Comprehensive Metabolic Panel  -     TSH  -     T4, Free  -     Vitamin B12  -     CBC & Differential  -     Hemoglobin A1c    Pure hypercholesterolemia  -     Comprehensive Metabolic Panel  -     TSH  -      T4, Free  -     Vitamin B12  -     CBC & Differential  -     Hemoglobin A1c    Essential hypertension  -     Comprehensive Metabolic Panel  -     TSH  -     T4, Free  -     Vitamin B12  -     CBC & Differential  -     Hemoglobin A1c    Acquired hypothyroidism  -     Comprehensive Metabolic Panel  -     TSH  -     T4, Free  -     Vitamin B12  -     CBC & Differential  -     Hemoglobin A1c    Other orders  -     levothyroxine (SYNTHROID, LEVOTHROID) 150 MCG tablet; Take 1 tablet by mouth Daily.      Return in about 4 months (around 11/10/2019).          There are no Patient Instructions on file for this visit.     Praful Rapp MD    Assessment/Plan

## 2019-09-27 ENCOUNTER — OFFICE VISIT (OUTPATIENT)
Dept: CARDIOLOGY | Facility: CLINIC | Age: 59
End: 2019-09-27

## 2019-09-27 VITALS
HEART RATE: 85 BPM | SYSTOLIC BLOOD PRESSURE: 124 MMHG | HEIGHT: 69 IN | WEIGHT: 314 LBS | DIASTOLIC BLOOD PRESSURE: 86 MMHG | OXYGEN SATURATION: 100 % | BODY MASS INDEX: 46.51 KG/M2

## 2019-09-27 DIAGNOSIS — E78.00 PURE HYPERCHOLESTEROLEMIA: ICD-10-CM

## 2019-09-27 DIAGNOSIS — I10 BENIGN ESSENTIAL HYPERTENSION: Primary | ICD-10-CM

## 2019-09-27 DIAGNOSIS — E66.01 MORBID OBESITY (HCC): ICD-10-CM

## 2019-09-27 DIAGNOSIS — R07.2 PRECORDIAL PAIN: ICD-10-CM

## 2019-09-27 PROCEDURE — 99213 OFFICE O/P EST LOW 20 MIN: CPT | Performed by: INTERNAL MEDICINE

## 2019-09-27 NOTE — PROGRESS NOTES
Subjective:     Encounter Date:09/27/2019      Patient ID: Miller Em is a 59 y.o. male.    Chief Complaint: Chest pain  HPI  This is a 59-year-old male patient who is been experiencing atypical chest discomfort who underwent a treadmill exercise stress test and an echocardiogram.  His treadmill stress test showed no evidence of ischemia by clinical or EKG criteria.  He had moderate to severe aerobic deconditioning.  There was no exercise-induced arrhythmia.  Heart rate and blood pressure response to exercise was normal.  Patient underwent an echocardiogram which showed aortic valve sclerosis without stenosis.  The echocardiogram was otherwise normal. The echocardiogram showed no evidence of ventricular hypertrophy or cardiac chamber enlargement.  Left ventricular systolic and diastolic function was normal.  There was no evidence of valvular pathology of significance, pericardial disease, pulmonary hypertension or intracardiac shunting.  The left ventricular ejection fraction was normal and there were no regional wall motion abnormalities.  The patient continues to have a left upper pectoralis jabbing\stabbing pain which occurs at rest and is related to use of his left upper extremity.  He has had no exertional chest arm neck jaw shoulder or back discomfort.  He has no shortness of breath at rest or with activity.  He has no orthopnea PND or lower extremity edema.  There is no dizziness palpitations or syncope.  He remains a reformed smoker.  He reports compliance with his medications.  He has lost 3 pounds of weight since his last visit.    The following portions of the patient's history were reviewed and updated as appropriate: allergies, current medications, past family history, past medical history, past social history, past surgical history and problem  Review of Systems   Constitution: Negative for chills, diaphoresis, fever, weakness, malaise/fatigue, weight gain and weight loss.   HENT: Negative for  ear discharge, hearing loss, hoarse voice and nosebleeds.    Eyes: Negative for discharge, double vision, pain and photophobia.   Cardiovascular: Positive for chest pain. Negative for claudication, cyanosis, dyspnea on exertion, irregular heartbeat, leg swelling, near-syncope, orthopnea, palpitations, paroxysmal nocturnal dyspnea and syncope.   Respiratory: Negative for cough, hemoptysis, shortness of breath, sputum production and wheezing.    Endocrine: Negative for cold intolerance, heat intolerance, polydipsia, polyphagia and polyuria.   Hematologic/Lymphatic: Negative for adenopathy and bleeding problem. Does not bruise/bleed easily.   Skin: Negative for color change, flushing, itching and rash.   Musculoskeletal: Negative for muscle cramps, muscle weakness, myalgias and stiffness.   Gastrointestinal: Negative for abdominal pain, diarrhea, hematemesis, hematochezia, nausea and vomiting.   Genitourinary: Negative for dysuria, frequency and nocturia.   Neurological: Negative for dizziness, focal weakness, light-headedness, loss of balance, numbness, paresthesias and seizures.   Psychiatric/Behavioral: Negative for altered mental status, hallucinations and suicidal ideas.   Allergic/Immunologic: Negative for HIV exposure, hives and persistent infections.           Current Outpatient Medications:   •  allopurinol (ZYLOPRIM) 100 MG tablet, TAKE 1 TABLET DAILY, Disp: 90 tablet, Rfl: 3  •  aspirin 81 MG EC tablet, Take 1 tablet by mouth Daily., Disp: 30 tablet, Rfl: 11  •  atorvastatin (LIPITOR) 20 MG tablet, TAKE 1 TABLET EVERY NIGHT, Disp: 90 tablet, Rfl: 3  •  cholecalciferol (VITAMIN D3) 1000 UNITS tablet, Take 2 capsules by mouth daily., Disp: , Rfl:   •  doxazosin (CARDURA) 4 MG tablet, TAKE 1 TABLET DAILY, Disp: 90 tablet, Rfl: 3  •  levothyroxine (SYNTHROID, LEVOTHROID) 150 MCG tablet, Take 1 tablet by mouth Daily., Disp: 90 tablet, Rfl: 3  •  losartan (COZAAR) 100 MG tablet, TAKE 1 TABLET DAILY, Disp: 90  "tablet, Rfl: 3  •  metoprolol succinate XL (TOPROL-XL) 50 MG 24 hr tablet, TAKE 1 TABLET DAILY, Disp: 90 tablet, Rfl: 3  •  Multiple Vitamin tablet, Take  by mouth daily., Disp: , Rfl:     Objective:   Physical Exam   Constitutional: He is oriented to person, place, and time. He appears well-developed and well-nourished. No distress.   HENT:   Head: Normocephalic and atraumatic.   Mouth/Throat: Oropharynx is clear and moist.   Eyes: Conjunctivae and EOM are normal. Pupils are equal, round, and reactive to light. No scleral icterus.   Neck: Normal range of motion. Neck supple. No JVD present. No tracheal deviation present. No thyromegaly present.   Cardiovascular: Normal rate, regular rhythm, S1 normal, S2 normal, normal heart sounds, intact distal pulses and normal pulses. PMI is not displaced. Exam reveals no gallop and no friction rub.   No murmur heard.  Pulmonary/Chest: Effort normal and breath sounds normal. No stridor. No respiratory distress. He has no wheezes. He has no rales.   Abdominal: Soft. Bowel sounds are normal. He exhibits no distension and no mass. There is no tenderness. There is no rebound and no guarding.   Musculoskeletal: Normal range of motion. He exhibits no edema, tenderness or deformity.   Neurological: He is alert and oriented to person, place, and time. He displays normal reflexes. No cranial nerve deficit. Coordination normal.   Skin: Skin is warm and dry. No rash noted. He is not diaphoretic. No erythema.   Psychiatric: He has a normal mood and affect. His behavior is normal. Thought content normal.     Blood pressure 124/86, pulse 85, height 175.3 cm (69\"), weight (!) 142 kg (314 lb), SpO2 100 %.   Lab Review:     Assessment:       1. Benign essential hypertension  Acceptable blood pressure control.    2. Pure hypercholesterolemia  On statin based cholesterol-lowering therapy.  He is being followed by his primary care provider for this diagnosis.    3. Morbid obesity (CMS/HCC)  Body " mass index remains greater than 46.  This is due to excess calorie intake.  There is evidence of comorbidities.  The obesity pattern is central.    4. Precordial pain  Noncardiac-musculoskeletal chest pain.  No evidence of ischemic heart disease.    Procedures    Plan:     No changes in his medication therapy have been made at today's visit.  No additional cardiovascular testing is warranted.  Patient has been counseled regarding the importance of diet exercise and weight management

## 2019-10-21 ENCOUNTER — OFFICE VISIT (OUTPATIENT)
Dept: NEUROLOGY | Facility: CLINIC | Age: 59
End: 2019-10-21

## 2019-10-21 VITALS
TEMPERATURE: 97.6 F | SYSTOLIC BLOOD PRESSURE: 102 MMHG | OXYGEN SATURATION: 94 % | HEART RATE: 83 BPM | DIASTOLIC BLOOD PRESSURE: 60 MMHG

## 2019-10-21 DIAGNOSIS — G47.33 OSA (OBSTRUCTIVE SLEEP APNEA): Primary | ICD-10-CM

## 2019-10-21 PROCEDURE — 99213 OFFICE O/P EST LOW 20 MIN: CPT | Performed by: NURSE PRACTITIONER

## 2020-05-22 ENCOUNTER — TELEPHONE (OUTPATIENT)
Dept: INTERNAL MEDICINE | Facility: CLINIC | Age: 60
End: 2020-05-22

## 2020-05-22 RX ORDER — LEVOTHYROXINE SODIUM 0.15 MG/1
137 TABLET ORAL DAILY
Qty: 30 TABLET | Refills: 0 | Status: SHIPPED | OUTPATIENT
Start: 2020-05-22 | End: 2020-06-12 | Stop reason: SDUPTHER

## 2020-06-09 LAB
ALBUMIN SERPL-MCNC: 4.7 G/DL (ref 3.5–5.2)
ALBUMIN/GLOB SERPL: 2 G/DL
ALP SERPL-CCNC: 61 U/L (ref 39–117)
ALT SERPL-CCNC: 31 U/L (ref 1–41)
AST SERPL-CCNC: 20 U/L (ref 1–40)
BASOPHILS # BLD AUTO: 0.06 10*3/MM3 (ref 0–0.2)
BASOPHILS NFR BLD AUTO: 1 % (ref 0–1.5)
BILIRUB SERPL-MCNC: 0.4 MG/DL (ref 0.2–1.2)
BUN SERPL-MCNC: 18 MG/DL (ref 8–23)
BUN/CREAT SERPL: 13.7 (ref 7–25)
CALCIUM SERPL-MCNC: 11.5 MG/DL (ref 8.6–10.5)
CHLORIDE SERPL-SCNC: 107 MMOL/L (ref 98–107)
CO2 SERPL-SCNC: 26.3 MMOL/L (ref 22–29)
CREAT SERPL-MCNC: 1.31 MG/DL (ref 0.76–1.27)
EOSINOPHIL # BLD AUTO: 0.19 10*3/MM3 (ref 0–0.4)
EOSINOPHIL NFR BLD AUTO: 3.1 % (ref 0.3–6.2)
ERYTHROCYTE [DISTWIDTH] IN BLOOD BY AUTOMATED COUNT: 15.4 % (ref 12.3–15.4)
GLOBULIN SER CALC-MCNC: 2.3 GM/DL
GLUCOSE SERPL-MCNC: 102 MG/DL (ref 65–99)
HBA1C MFR BLD: 6.6 % (ref 4.8–5.6)
HCT VFR BLD AUTO: 49.3 % (ref 37.5–51)
HGB BLD-MCNC: 15.5 G/DL (ref 13–17.7)
IMM GRANULOCYTES # BLD AUTO: 0.02 10*3/MM3 (ref 0–0.05)
IMM GRANULOCYTES NFR BLD AUTO: 0.3 % (ref 0–0.5)
LYMPHOCYTES # BLD AUTO: 2.7 10*3/MM3 (ref 0.7–3.1)
LYMPHOCYTES NFR BLD AUTO: 43.6 % (ref 19.6–45.3)
MCH RBC QN AUTO: 27.1 PG (ref 26.6–33)
MCHC RBC AUTO-ENTMCNC: 31.4 G/DL (ref 31.5–35.7)
MCV RBC AUTO: 86.3 FL (ref 79–97)
MONOCYTES # BLD AUTO: 0.76 10*3/MM3 (ref 0.1–0.9)
MONOCYTES NFR BLD AUTO: 12.3 % (ref 5–12)
NEUTROPHILS # BLD AUTO: 2.46 10*3/MM3 (ref 1.7–7)
NEUTROPHILS NFR BLD AUTO: 39.7 % (ref 42.7–76)
NRBC BLD AUTO-RTO: 0 /100 WBC (ref 0–0.2)
PLATELET # BLD AUTO: 181 10*3/MM3 (ref 140–450)
POTASSIUM SERPL-SCNC: 4.6 MMOL/L (ref 3.5–5.2)
PROT SERPL-MCNC: 7 G/DL (ref 6–8.5)
RBC # BLD AUTO: 5.71 10*6/MM3 (ref 4.14–5.8)
SODIUM SERPL-SCNC: 143 MMOL/L (ref 136–145)
T4 FREE SERPL-MCNC: 1.28 NG/DL (ref 0.93–1.7)
TSH SERPL DL<=0.005 MIU/L-ACNC: 2.91 UIU/ML (ref 0.27–4.2)
VIT B12 SERPL-MCNC: 501 PG/ML (ref 211–946)
WBC # BLD AUTO: 6.19 10*3/MM3 (ref 3.4–10.8)

## 2020-06-12 ENCOUNTER — OFFICE VISIT (OUTPATIENT)
Dept: INTERNAL MEDICINE | Facility: CLINIC | Age: 60
End: 2020-06-12

## 2020-06-12 VITALS
BODY MASS INDEX: 45.03 KG/M2 | HEART RATE: 72 BPM | WEIGHT: 304 LBS | DIASTOLIC BLOOD PRESSURE: 84 MMHG | RESPIRATION RATE: 16 BRPM | OXYGEN SATURATION: 99 % | SYSTOLIC BLOOD PRESSURE: 135 MMHG | TEMPERATURE: 97.7 F | HEIGHT: 69 IN

## 2020-06-12 DIAGNOSIS — E78.00 PURE HYPERCHOLESTEROLEMIA: ICD-10-CM

## 2020-06-12 DIAGNOSIS — I10 ESSENTIAL HYPERTENSION: ICD-10-CM

## 2020-06-12 DIAGNOSIS — E03.9 ACQUIRED HYPOTHYROIDISM: ICD-10-CM

## 2020-06-12 DIAGNOSIS — M10.9 GOUT, UNSPECIFIED CAUSE, UNSPECIFIED CHRONICITY, UNSPECIFIED SITE: ICD-10-CM

## 2020-06-12 DIAGNOSIS — Z79.4 TYPE 2 DIABETES MELLITUS WITH OTHER CIRCULATORY COMPLICATION, WITH LONG-TERM CURRENT USE OF INSULIN (HCC): ICD-10-CM

## 2020-06-12 DIAGNOSIS — G47.33 OSA ON CPAP: ICD-10-CM

## 2020-06-12 DIAGNOSIS — E11.59 TYPE 2 DIABETES MELLITUS WITH OTHER CIRCULATORY COMPLICATION, WITH LONG-TERM CURRENT USE OF INSULIN (HCC): ICD-10-CM

## 2020-06-12 DIAGNOSIS — R79.89 LOW VITAMIN D LEVEL: ICD-10-CM

## 2020-06-12 DIAGNOSIS — E83.52 HYPERCALCEMIA: Primary | ICD-10-CM

## 2020-06-12 DIAGNOSIS — E21.3 HYPERPARATHYROIDISM (HCC): ICD-10-CM

## 2020-06-12 DIAGNOSIS — Z99.89 OSA ON CPAP: ICD-10-CM

## 2020-06-12 PROCEDURE — 99214 OFFICE O/P EST MOD 30 MIN: CPT | Performed by: INTERNAL MEDICINE

## 2020-06-12 RX ORDER — BLOOD-GLUCOSE METER
1 KIT MISCELLANEOUS AS NEEDED
Qty: 1 EACH | Refills: 1 | Status: SHIPPED | OUTPATIENT
Start: 2020-06-12 | End: 2023-04-04

## 2020-06-12 RX ORDER — ATORVASTATIN CALCIUM 20 MG/1
20 TABLET, FILM COATED ORAL NIGHTLY
Qty: 90 TABLET | Refills: 3 | Status: SHIPPED | OUTPATIENT
Start: 2020-06-12 | End: 2021-04-07 | Stop reason: SDUPTHER

## 2020-06-12 RX ORDER — DOXAZOSIN MESYLATE 4 MG/1
4 TABLET ORAL DAILY
Qty: 90 TABLET | Refills: 3 | Status: SHIPPED | OUTPATIENT
Start: 2020-06-12 | End: 2021-04-07 | Stop reason: SDUPTHER

## 2020-06-12 RX ORDER — MELATONIN
1000 DAILY
Qty: 90 TABLET | Refills: 3 | Status: SHIPPED | OUTPATIENT
Start: 2020-06-12 | End: 2021-04-07 | Stop reason: SDUPTHER

## 2020-06-12 RX ORDER — LEVOTHYROXINE SODIUM 0.15 MG/1
137 TABLET ORAL DAILY
Qty: 90 TABLET | Refills: 3 | Status: SHIPPED | OUTPATIENT
Start: 2020-06-12 | End: 2020-09-14

## 2020-06-12 RX ORDER — LANCETS
1 EACH MISCELLANEOUS AS NEEDED
Qty: 30 EACH | Refills: 12 | Status: SHIPPED | OUTPATIENT
Start: 2020-06-12 | End: 2020-06-12 | Stop reason: SDUPTHER

## 2020-06-12 RX ORDER — LANCETS
1 EACH MISCELLANEOUS AS NEEDED
Qty: 30 EACH | Refills: 12 | Status: SHIPPED | OUTPATIENT
Start: 2020-06-12 | End: 2020-06-19 | Stop reason: SDUPTHER

## 2020-06-12 RX ORDER — LOSARTAN POTASSIUM 100 MG/1
100 TABLET ORAL DAILY
Qty: 90 TABLET | Refills: 3 | Status: SHIPPED | OUTPATIENT
Start: 2020-06-12 | End: 2021-04-07 | Stop reason: SDUPTHER

## 2020-06-12 RX ORDER — ALLOPURINOL 100 MG/1
100 TABLET ORAL DAILY
Qty: 90 TABLET | Refills: 3 | Status: SHIPPED | OUTPATIENT
Start: 2020-06-12 | End: 2021-04-07 | Stop reason: SDUPTHER

## 2020-06-12 RX ORDER — BLOOD-GLUCOSE METER
1 KIT MISCELLANEOUS AS NEEDED
Qty: 1 EACH | Refills: 1 | Status: SHIPPED | OUTPATIENT
Start: 2020-06-12 | End: 2020-06-12 | Stop reason: SDUPTHER

## 2020-06-12 RX ORDER — METOPROLOL SUCCINATE 50 MG/1
50 TABLET, EXTENDED RELEASE ORAL DAILY
Qty: 90 TABLET | Refills: 3 | Status: SHIPPED | OUTPATIENT
Start: 2020-06-12 | End: 2021-04-07 | Stop reason: SDUPTHER

## 2020-06-12 NOTE — PROGRESS NOTES
Subjective     Patient ID: Miller Em is a 60 y.o. male. Patient is here for management of multiple medical problems.     Chief Complaint   Patient presents with   • Annual Exam     patient here for yearly check-up   • Diabetes     patient requesting diabetic supplies     History of Present Illness       Annual exam.    DM stabel.  bs running well.  needs supplies.    On cpap. Was told he needed a differen machine.  Dr Macdonald has left.            The following portions of the patient's history were reviewed and updated as appropriate: allergies, current medications, past family history, past medical history, past social history, past surgical history and problem list.    Review of Systems   Constitutional: Negative for fatigue.   Respiratory: Negative for apnea, choking, chest tightness and shortness of breath.    Psychiatric/Behavioral: Negative for self-injury and sleep disturbance. The patient is not nervous/anxious and is not hyperactive.    All other systems reviewed and are negative.      Current Outpatient Medications:   •  allopurinol (ZYLOPRIM) 100 MG tablet, Take 1 tablet by mouth Daily., Disp: 90 tablet, Rfl: 3  •  aspirin 81 MG EC tablet, Take 1 tablet by mouth Daily., Disp: 30 tablet, Rfl: 11  •  atorvastatin (LIPITOR) 20 MG tablet, Take 1 tablet by mouth Every Night., Disp: 90 tablet, Rfl: 3  •  cholecalciferol (VITAMIN D3) 25 MCG (1000 UT) tablet, Take 1 tablet by mouth Daily., Disp: 90 tablet, Rfl: 3  •  doxazosin (CARDURA) 4 MG tablet, Take 1 tablet by mouth Daily., Disp: 90 tablet, Rfl: 3  •  levothyroxine (SYNTHROID, LEVOTHROID) 150 MCG tablet, Take 1 tablet by mouth Daily. Needs an appointment for additional refills, Disp: 90 tablet, Rfl: 3  •  losartan (COZAAR) 100 MG tablet, Take 1 tablet by mouth Daily., Disp: 90 tablet, Rfl: 3  •  metFORMIN (GLUCOPHAGE) 500 MG tablet, Take 1 tablet by mouth Daily With Breakfast., Disp: 90 tablet, Rfl: 3  •  metoprolol succinate XL (TOPROL-XL) 50 MG 24 hr  "tablet, Take 1 tablet by mouth Daily., Disp: 90 tablet, Rfl: 3  •  Multiple Vitamin tablet, Take  by mouth daily., Disp: , Rfl:   •  glucose blood test strip, Use as instructed, Disp: 100 each, Rfl: 11  •  glucose monitor monitoring kit, 1 each As Needed (bs monitor)., Disp: 1 each, Rfl: 1  •  Lancets (ACCU-CHEK MULTICLIX) lancets, 1 each by Other route As Needed (bs monitor)., Disp: 30 each, Rfl: 12    Objective      Blood pressure 135/84, pulse 72, temperature 97.7 °F (36.5 °C), temperature source Temporal, resp. rate 16, height 175.3 cm (69\"), weight (!) 138 kg (304 lb), SpO2 99 %.    Physical Exam     General Appearance:    Alert, cooperative, no distress, appears stated age, morbid obesity.    Head:    Normocephalic, without obvious abnormality, atraumatic   Eyes:    PERRL, conjunctiva/corneas clear, EOM's intact   Ears:    Normal TM's and external ear canals, both ears   Nose:   Nares normal, septum midline, mucosa normal, no drainage   or sinus tenderness   Throat:   Lips, mucosa, and tongue normal; teeth and gums normal   Neck:   Supple, symmetrical, trachea midline, no adenopathy;        thyroid:  No enlargement/tenderness/nodules; no carotid    bruit or JVD   Back:     Symmetric, no curvature, ROM normal, no CVA tenderness   Lungs:     Clear to auscultation bilaterally, respirations unlabored   Chest wall:    No tenderness or deformity   Heart:    Regular rate and rhythm, S1 and S2 normal, no murmur,        rub or gallop   Abdomen:     Soft, non-tender, bowel sounds active all four quadrants,     no masses, no organomegaly   Extremities:   Extremities normal, atraumatic, no cyanosis or edema   Pulses:   2+ and symmetric all extremities   Skin:   Skin color, texture, turgor normal, no rashes or lesions   Lymph nodes:   Cervical, supraclavicular, and axillary nodes normal   Neurologic:   CNII-XII intact. Normal strength, sensation and reflexes       throughout      Results for orders placed or performed in " visit on 07/10/19   Comprehensive Metabolic Panel   Result Value Ref Range    Glucose 102 (H) 65 - 99 mg/dL    BUN 18 8 - 23 mg/dL    Creatinine 1.31 (H) 0.76 - 1.27 mg/dL    eGFR Non African Am 55 (L) >60 mL/min/1.73    eGFR African Am 67 >60 mL/min/1.73    BUN/Creatinine Ratio 13.7 7.0 - 25.0    Sodium 143 136 - 145 mmol/L    Potassium 4.6 3.5 - 5.2 mmol/L    Chloride 107 98 - 107 mmol/L    Total CO2 26.3 22.0 - 29.0 mmol/L    Calcium 11.5 (H) 8.6 - 10.5 mg/dL    Total Protein 7.0 6.0 - 8.5 g/dL    Albumin 4.70 3.50 - 5.20 g/dL    Globulin 2.3 gm/dL    A/G Ratio 2.0 g/dL    Total Bilirubin 0.4 0.2 - 1.2 mg/dL    Alkaline Phosphatase 61 39 - 117 U/L    AST (SGOT) 20 1 - 40 U/L    ALT (SGPT) 31 1 - 41 U/L   TSH   Result Value Ref Range    TSH 2.910 0.270 - 4.200 uIU/mL   T4, Free   Result Value Ref Range    Free T4 1.28 0.93 - 1.70 ng/dL   Vitamin B12   Result Value Ref Range    Vitamin B-12 501 211 - 946 pg/mL   Hemoglobin A1c   Result Value Ref Range    Hemoglobin A1C 6.60 (H) 4.80 - 5.60 %   CBC & Differential   Result Value Ref Range    WBC 6.19 3.40 - 10.80 10*3/mm3    RBC 5.71 4.14 - 5.80 10*6/mm3    Hemoglobin 15.5 13.0 - 17.7 g/dL    Hematocrit 49.3 37.5 - 51.0 %    MCV 86.3 79.0 - 97.0 fL    MCH 27.1 26.6 - 33.0 pg    MCHC 31.4 (L) 31.5 - 35.7 g/dL    RDW 15.4 12.3 - 15.4 %    Platelets 181 140 - 450 10*3/mm3    Neutrophil Rel % 39.7 (L) 42.7 - 76.0 %    Lymphocyte Rel % 43.6 19.6 - 45.3 %    Monocyte Rel % 12.3 (H) 5.0 - 12.0 %    Eosinophil Rel % 3.1 0.3 - 6.2 %    Basophil Rel % 1.0 0.0 - 1.5 %    Neutrophils Absolute 2.46 1.70 - 7.00 10*3/mm3    Lymphocytes Absolute 2.70 0.70 - 3.10 10*3/mm3    Monocytes Absolute 0.76 0.10 - 0.90 10*3/mm3    Eosinophils Absolute 0.19 0.00 - 0.40 10*3/mm3    Basophils Absolute 0.06 0.00 - 0.20 10*3/mm3    Immature Granulocyte Rel % 0.3 0.0 - 0.5 %    Immature Grans Absolute 0.02 0.00 - 0.05 10*3/mm3    nRBC 0.0 0.0 - 0.2 /100 WBC         Assessment/Plan       Miller moe  seen today for annual exam and diabetes.    Diagnoses and all orders for this visit:    Hypercalcemia    Essential hypertension  -     metoprolol succinate XL (TOPROL-XL) 50 MG 24 hr tablet; Take 1 tablet by mouth Daily.  -     losartan (COZAAR) 100 MG tablet; Take 1 tablet by mouth Daily.  -     atorvastatin (LIPITOR) 20 MG tablet; Take 1 tablet by mouth Every Night.  -     Protein Elec + Interp, Serum  -     PTH, Intact  -     Comprehensive Metabolic Panel  -     Uric Acid    Pure hypercholesterolemia  -     losartan (COZAAR) 100 MG tablet; Take 1 tablet by mouth Daily.  -     Protein Elec + Interp, Serum  -     PTH, Intact  -     Comprehensive Metabolic Panel  -     Uric Acid    Acquired hypothyroidism  -     losartan (COZAAR) 100 MG tablet; Take 1 tablet by mouth Daily.  -     levothyroxine (SYNTHROID, LEVOTHROID) 150 MCG tablet; Take 1 tablet by mouth Daily. Needs an appointment for additional refills  -     Protein Elec + Interp, Serum  -     PTH, Intact  -     Comprehensive Metabolic Panel  -     Uric Acid    Hyperparathyroidism (CMS/HCC)  -     losartan (COZAAR) 100 MG tablet; Take 1 tablet by mouth Daily.  -     Protein Elec + Interp, Serum  -     PTH, Intact  -     Comprehensive Metabolic Panel  -     Uric Acid    Type 2 diabetes mellitus with other circulatory complication, with long-term current use of insulin (CMS/HCC)  -     metFORMIN (GLUCOPHAGE) 500 MG tablet; Take 1 tablet by mouth Daily With Breakfast.  -     doxazosin (CARDURA) 4 MG tablet; Take 1 tablet by mouth Daily.    Gout, unspecified cause, unspecified chronicity, unspecified site  -     allopurinol (ZYLOPRIM) 100 MG tablet; Take 1 tablet by mouth Daily.    Low vitamin D level  -     cholecalciferol (VITAMIN D3) 25 MCG (1000 UT) tablet; Take 1 tablet by mouth Daily.  -     Vitamin D 25 Hydroxy    RIGO on CPAP  -     Ambulatory Referral to Pulmonology    Other orders  -     glucose blood test strip; Use as instructed  -     glucose monitor  monitoring kit; 1 each As Needed (bs monitor).  -     Lancets (ACCU-CHEK MULTICLIX) lancets; 1 each by Other route As Needed (bs monitor).      Return in about 3 months (around 9/12/2020).          There are no Patient Instructions on file for this visit.     Praful Rapp MD    Assessment/Plan

## 2020-06-19 RX ORDER — LANCETS
1 EACH MISCELLANEOUS AS NEEDED
Qty: 100 EACH | Refills: 3 | Status: SHIPPED | OUTPATIENT
Start: 2020-06-19 | End: 2020-06-23 | Stop reason: SDUPTHER

## 2020-06-20 LAB — 25(OH)D3+25(OH)D2 SERPL-MCNC: 27.3 NG/ML (ref 30–100)

## 2020-06-22 LAB
ALBUMIN SERPL ELPH-MCNC: 3.9 G/DL (ref 2.9–4.4)
ALBUMIN SERPL-MCNC: 4.9 G/DL (ref 3.5–5.2)
ALBUMIN/GLOB SERPL: 1.3 {RATIO} (ref 0.7–1.7)
ALBUMIN/GLOB SERPL: 2.3 G/DL
ALP SERPL-CCNC: 65 U/L (ref 39–117)
ALPHA1 GLOB SERPL ELPH-MCNC: 0.2 G/DL (ref 0–0.4)
ALPHA2 GLOB SERPL ELPH-MCNC: 0.9 G/DL (ref 0.4–1)
ALT SERPL-CCNC: 26 U/L (ref 1–41)
AST SERPL-CCNC: 22 U/L (ref 1–40)
B-GLOBULIN SERPL ELPH-MCNC: 1.1 G/DL (ref 0.7–1.3)
BILIRUB SERPL-MCNC: 0.3 MG/DL (ref 0.2–1.2)
BUN SERPL-MCNC: 14 MG/DL (ref 8–23)
BUN/CREAT SERPL: 10.7 (ref 7–25)
CALCIUM SERPL-MCNC: 11.4 MG/DL (ref 8.6–10.5)
CHLORIDE SERPL-SCNC: 105 MMOL/L (ref 98–107)
CO2 SERPL-SCNC: 24.4 MMOL/L (ref 22–29)
CREAT SERPL-MCNC: 1.31 MG/DL (ref 0.76–1.27)
GAMMA GLOB SERPL ELPH-MCNC: 0.9 G/DL (ref 0.4–1.8)
GLOBULIN SER CALC-MCNC: 2.1 GM/DL
GLOBULIN SER CALC-MCNC: 3.1 G/DL (ref 2.2–3.9)
GLUCOSE SERPL-MCNC: 97 MG/DL (ref 65–99)
LABORATORY COMMENT REPORT: NORMAL
M PROTEIN SERPL ELPH-MCNC: NORMAL G/DL
POTASSIUM SERPL-SCNC: 4.3 MMOL/L (ref 3.5–5.2)
PROT PATTERN SERPL ELPH-IMP: NORMAL
PROT SERPL-MCNC: 7 G/DL (ref 6–8.5)
PTH-INTACT SERPL-MCNC: 55 PG/ML (ref 15–65)
SODIUM SERPL-SCNC: 143 MMOL/L (ref 136–145)
URATE SERPL-MCNC: 6.5 MG/DL (ref 3.4–7)

## 2020-06-23 ENCOUNTER — TELEPHONE (OUTPATIENT)
Dept: INTERNAL MEDICINE | Facility: CLINIC | Age: 60
End: 2020-06-23

## 2020-06-23 RX ORDER — LANCETS
EACH MISCELLANEOUS
Qty: 100 EACH | Refills: 3 | OUTPATIENT
Start: 2020-06-23 | End: 2021-04-07 | Stop reason: SDUPTHER

## 2020-06-23 NOTE — TELEPHONE ENCOUNTER
MOUNIKA WITH [employer] [employer] CALLING TO CLARIFY A PRESCRIPTION.       DOCTOR ORDERED:   Lancets (ACCU-CHEK MULTICLIX) lancets    PATIENT IS SPECIFICALLY REQUESTING ACCU-CHECK GUIDE STRIPS AND ACCU-CHECK FAST CLICK LANCETS.     PATIENT REQUESTING 90-DAY SUPPLY.     [facility] -[phone]     PLEASE USE PHARM REF# [id number]

## 2020-08-19 ENCOUNTER — OFFICE VISIT (OUTPATIENT)
Dept: PULMONOLOGY | Facility: CLINIC | Age: 60
End: 2020-08-19

## 2020-08-19 VITALS
SYSTOLIC BLOOD PRESSURE: 124 MMHG | OXYGEN SATURATION: 98 % | WEIGHT: 314 LBS | BODY MASS INDEX: 46.51 KG/M2 | RESPIRATION RATE: 18 BRPM | HEIGHT: 69 IN | HEART RATE: 94 BPM | TEMPERATURE: 97.1 F | DIASTOLIC BLOOD PRESSURE: 76 MMHG

## 2020-08-19 DIAGNOSIS — G47.33 OSA (OBSTRUCTIVE SLEEP APNEA): Primary | ICD-10-CM

## 2020-08-19 DIAGNOSIS — G47.19 EXCESSIVE DAYTIME SLEEPINESS: ICD-10-CM

## 2020-08-19 PROCEDURE — 99202 OFFICE O/P NEW SF 15 MIN: CPT | Performed by: NURSE PRACTITIONER

## 2020-08-19 NOTE — PROGRESS NOTES
CONSULT NOTE    Requested by:   Praful Rapp MD Geile, Michael A, MD      Chief Complaint   Patient presents with   • Consult   • Sleeping Problem       Subjective:  Miller Em is a 60 y.o. male.     History of Present Illness   The patient is seen today in consultation for obstructive sleep apnea.    He previously followed with Dr. Macdonald's office.    He states he is used CPAP therapy for many years however he stopped using the machine because the machine was not working properly and he was told he may need a different machine.  As I looked through the neurology notes BiPAP was mentioned and a new sleep study was ordered in 2018 however the patient did not have another sleep study.  I do not see where BiPAP was ever ordered.    He comes in today because he continues to have excessive daytime sleepiness and unrestful sleep at night.  He gets out of the bed to urinate multiple times a night.  He states he usually takes 1-2 naps a day and the naps ranged from 2 to 3 hours each.    He has no difficulty falling asleep driving.    He drinks 5 cups of caffeine total a day.  He does report alcohol intake consisting of 6 beers and 2 shots of liquor per week.    He quit smoking 15 to 20 years ago.    He states he has never needed oxygen at night or otherwise.    He does report having about a 10 pound weight gain in the last 2 months.    The following portions of the patient's history were reviewed and updated as appropriate: allergies, current medications, past family history, past medical history, past social history and past surgical history.    Review of Systems   Constitutional: Positive for fatigue. Negative for chills and fever.   HENT: Negative for rhinorrhea, sinus pressure, sinus pain, sneezing, sore throat, trouble swallowing and voice change.    Respiratory: Negative for cough, chest tightness, shortness of breath and wheezing.    Cardiovascular: Negative for chest pain, palpitations and leg swelling.  "  Psychiatric/Behavioral: Positive for sleep disturbance.   All other systems reviewed and are negative.      Past Medical History:   Diagnosis Date   • Diabetes (CMS/[facility])    • Diabetes mellitus (CMS/[facility])    • Gout    • Headache, migraine    • Headache, tension-type    • Hyperlipidemia    • Hypertension    • Sleep apnea    • Thyroid condition        Social History     Tobacco Use   • Smoking status: Former Smoker     Packs/day: 0.25     Years: 5.00     Pack years: 1.25     Types: Cigarettes     Last attempt to quit:      Years since quittin.6   • Smokeless tobacco: Never Used   • Tobacco comment: on and off x 10-15 years 1 pack per week   Substance Use Topics   • Alcohol use: Yes     Types: 3 Cans of beer, 3 Shots of liquor per week     Comment: 1-2 wkly         Objective:  Visit Vitals  /76   Pulse 94   Temp 97.1 °F (36.2 °C)   Resp 18   Ht 175.3 cm (69\")   Wt (!) 142 kg (314 lb)   SpO2 98%   BMI 46.37 kg/m²       Physical Exam   Constitutional: He is oriented to person, place, and time.   HENT:   Head: Normocephalic and atraumatic.   Mallempati IV/IV.   Eyes: EOM are normal.   Neck: Neck supple.   Increased adipose tissue.   Cardiovascular: Normal rate and regular rhythm.   Pulmonary/Chest: Effort normal and breath sounds normal. No respiratory distress. He has no wheezes.   Abdominal:   Obese abdomen.   Musculoskeletal: He exhibits no edema.   Gait normal.   Neurological: He is alert and oriented to person, place, and time.   Psychiatric: He has a normal mood and affect.   Vitals reviewed.      Assessment/Plan:  Miller was seen today for consult and sleeping problem.    Diagnoses and all orders for this visit:    RIGO (obstructive sleep apnea)    Excessive daytime sleepiness        Return in about 12 weeks (around 2020) for sleep questionnaire, Recheck, For Me, Sleep ONLY.    DISCUSSION(if any):  Unfortunately I was not able to find his previous sleep study and since it was likely ordered in " 2014 or before I have told the patient that he will likely need another sleep study.  He is also changed insurances several times since he had his last machine.    It is also mentioned in Dr. Macdonald's notes that the patient may need BiPAP therefore a split-night study will be ordered.    The patient verbalizes understanding.    He is willing to use CPAP/BiPAP if needed and recommended.    A sleep questionnaire will be provided to the patient.      Dictated utilizing Dragon dictation.    This document was electronically signed by ODETTE Rojas on 08/19/20 at 11:10

## 2020-09-01 ENCOUNTER — HOSPITAL ENCOUNTER (OUTPATIENT)
Dept: SLEEP MEDICINE | Facility: HOSPITAL | Age: 60
Discharge: HOME | End: 2020-09-01
Admitting: NURSE PRACTITIONER

## 2020-09-01 DIAGNOSIS — G47.33 OSA (OBSTRUCTIVE SLEEP APNEA): ICD-10-CM

## 2020-09-01 DIAGNOSIS — G47.19 EXCESSIVE DAYTIME SLEEPINESS: ICD-10-CM

## 2020-09-01 PROCEDURE — 95810 POLYSOM 6/> YRS 4/> PARAM: CPT | Performed by: INTERNAL MEDICINE

## 2020-09-01 PROCEDURE — 95810 POLYSOM 6/> YRS 4/> PARAM: CPT

## 2020-09-14 DIAGNOSIS — E03.9 ACQUIRED HYPOTHYROIDISM: ICD-10-CM

## 2020-09-14 RX ORDER — LEVOTHYROXINE SODIUM 0.15 MG/1
TABLET ORAL
Qty: 90 TABLET | Refills: 3 | Status: SHIPPED | OUTPATIENT
Start: 2020-09-14 | End: 2021-04-07 | Stop reason: SDUPTHER

## 2020-09-28 ENCOUNTER — OFFICE VISIT (OUTPATIENT)
Dept: CARDIOLOGY | Facility: CLINIC | Age: 60
End: 2020-09-28

## 2020-09-28 VITALS
WEIGHT: 313 LBS | BODY MASS INDEX: 46.36 KG/M2 | OXYGEN SATURATION: 94 % | HEART RATE: 88 BPM | HEIGHT: 69 IN | SYSTOLIC BLOOD PRESSURE: 110 MMHG | DIASTOLIC BLOOD PRESSURE: 76 MMHG

## 2020-09-28 DIAGNOSIS — E11.9 DIABETES MELLITUS WITHOUT COMPLICATION (HCC): ICD-10-CM

## 2020-09-28 DIAGNOSIS — I10 BENIGN ESSENTIAL HYPERTENSION: Primary | ICD-10-CM

## 2020-09-28 DIAGNOSIS — G47.33 OSA (OBSTRUCTIVE SLEEP APNEA): Primary | ICD-10-CM

## 2020-09-28 DIAGNOSIS — G47.33 OSA ON CPAP: ICD-10-CM

## 2020-09-28 DIAGNOSIS — I10 HYPERTENSION, UNSPECIFIED TYPE: ICD-10-CM

## 2020-09-28 DIAGNOSIS — Z99.89 OSA ON CPAP: ICD-10-CM

## 2020-09-28 DIAGNOSIS — E78.2 MIXED HYPERLIPIDEMIA: ICD-10-CM

## 2020-09-28 PROCEDURE — 99214 OFFICE O/P EST MOD 30 MIN: CPT | Performed by: NURSE PRACTITIONER

## 2020-09-28 NOTE — PROGRESS NOTES
"Miller Em is a 60 y.o. male.  MRN #: [id number]    Referring Provider: Praful Rapp MD    Chief Complaint:   Chief Complaint   Patient presents with   • Follow-up   • Hypertension        History of Present Illness:   Mr Em is a 60-year-old gentleman the presents for cardiac follow-up.  Patient has history of essential hypertension, hyperlipidemia, diabetes mellitus that is controlled with oral medications, obesity with BMI of 46.2, obstructive sleep apnea with use of CPAP.  With today's evaluation, patient reports no untoward symptoms of chest pain, chest palpitations, chest pressure, unusual shortness of breath or diaphoresis at rest or with exertion.  Patient is a non-smoker.  He does have a history of obesity with a BMI of 46.2, and he admits that he does not adhere to \"the best diet\".  He also reports using added salt to his foods.  He denies any unusual weight gain or fluid retention.  He states he stays active, but admits he needs to be more consistent and states he needs more aerobic exercise.  He reports that his fingerstick glucoses typically stay between 110 and 120 mg/dL, and his last hemoglobin A1c was 7.2.    The patient presents today with their self who contributes to the history of their care.     The following portions of the patient's history were reviewed and updated as appropriate: allergies, current medications, past family history, past medical history, past social history, past surgical history and problem list.   Prior cardiac testing:     Treadmill stress test: 8/9/2018:  Stress Procedure    Rest ECG Baseline ECG of normal sinus rhythm noted. Normal baseline ECG noted.   There was no ST segment deviation noted.   Stress Description A stress test was performed following the Vikash protocol.   The patient reached the end of the protocol and achieved the target heart rate.   The patient reported no symptoms during the stress test. The patient experienced no angina during the stress " test.   Low risk for ischemic heart disease.   Blood pressure demonstrated a normal response to stress. Heart rate demonstrated a normal response to stress. Overall, the patient's exercise capacity was normal.   Stress ECG Stress ECG rhythm of sinus tachycardia noted. Normal ECG with no significant stress induced changes noted.   There was no ST segment deviation noted during stress.   There were no arrhythmias during stress.   There were no arrhythmias during recovery.   There were no significant arrhythmias noted during the test.    ECG was interpretable and indicates a normal stress ECG.    Normal ECG stress ECG interpretation.   Stress Findings No ECG evidence of myocardial ischemia.Negative clinical evidence of myocardial ischemia. Findings consistent with a normal ECG stress test.     Echocardiogram: 8/9/2018:    Interpretation Summary    · There is moderate calcification of the aortic valve mainly affecting the non coronary cusp(s).  · Left ventricular systolic function is normal. Estimated EF = 68%.       Review of Systems:     Review of Systems   Constitutional: Positive for fatigue. Negative for activity change, appetite change, diaphoresis, unexpected weight gain and unexpected weight loss.   Eyes: Negative.  Negative for blurred vision, double vision, photophobia and visual disturbance.   Respiratory: Negative for apnea, cough, chest tightness, shortness of breath and wheezing.    Cardiovascular: Negative for chest pain, palpitations and leg swelling.        History of essential hypertension.     Gastrointestinal: Negative for abdominal distention, abdominal pain, blood in stool, nausea, vomiting, GERD and indigestion.        BMI of 46.2   Endocrine: Negative.  Negative for cold intolerance, heat intolerance, polydipsia, polyphagia and polyuria.        Diabetes mellitus type 2 adequately controlled   Genitourinary: Negative.  Negative for decreased libido, frequency, genital sores, hematuria and urgency.    Musculoskeletal: Negative.  Negative for back pain, joint swelling, myalgias, neck pain and neck stiffness.   Skin: Negative.  Negative for color change, pallor and bruise.   Allergic/Immunologic: Negative.    Neurological: Positive for headache. Negative for dizziness, tremors, seizures, syncope, facial asymmetry, speech difficulty, weakness, light-headedness and numbness.   Hematological: Negative.  Negative for adenopathy. Does not bruise/bleed easily.   Psychiatric/Behavioral: Negative.  Negative for agitation, decreased concentration, self-injury, sleep disturbance, suicidal ideas, negative for hyperactivity and stress. The patient is not nervous/anxious.    All other systems reviewed and are negative.         Current Outpatient Medications:   •  Accu-Chek FastClix Lancets [facility], TEST DAILY FOR DM E11.9, Disp: 100 each, Rfl: 3  •  allopurinol (ZYLOPRIM) 100 MG tablet, Take 1 tablet by mouth Daily., Disp: 90 tablet, Rfl: 3  •  aspirin 81 MG EC tablet, Take 1 tablet by mouth Daily., Disp: 30 tablet, Rfl: 11  •  atorvastatin (LIPITOR) 20 MG tablet, Take 1 tablet by mouth Every Night., Disp: 90 tablet, Rfl: 3  •  cholecalciferol (VITAMIN D3) 25 MCG (1000 UT) tablet, Take 1 tablet by mouth Daily., Disp: 90 tablet, Rfl: 3  •  doxazosin (CARDURA) 4 MG tablet, Take 1 tablet by mouth Daily., Disp: 90 tablet, Rfl: 3  •  glucose blood test strip, Patient tests blood sugar one time daily.  Diagnosis code E11.9, Disp: 100 each, Rfl: 3  •  glucose monitor monitoring kit, 1 each As Needed (bs monitor)., Disp: 1 each, Rfl: 1  •  levothyroxine (SYNTHROID, LEVOTHROID) 150 MCG tablet, TAKE 1 TABLET BY MOUTH EVERY DAY, Disp: 90 tablet, Rfl: 3  •  losartan (COZAAR) 100 MG tablet, Take 1 tablet by mouth Daily., Disp: 90 tablet, Rfl: 3  •  metFORMIN (GLUCOPHAGE) 500 MG tablet, Take 1 tablet by mouth Daily With Breakfast., Disp: 90 tablet, Rfl: 3  •  metoprolol succinate XL (TOPROL-XL) 50 MG 24 hr tablet, Take 1 tablet by mouth  "Daily., Disp: 90 tablet, Rfl: 3  •  Multiple Vitamin tablet, Take  by mouth daily., Disp: , Rfl:     Vitals:    09/28/20 0857   BP: 110/76   BP Location: Right arm   Patient Position: Sitting   Cuff Size: Adult   Pulse: 88   SpO2: 94%   Weight: (!) 142 kg (313 lb)   Height: 175.3 cm (69\")       Physical Exam:     Physical Exam  Vitals signs and nursing note reviewed.   Constitutional:       Appearance: He is well-developed.   HENT:      Head: Normocephalic and atraumatic.      Nose: Nose normal. No congestion.      Mouth/Throat:      Mouth: Mucous membranes are moist.   Eyes:      General: No scleral icterus.     Conjunctiva/sclera: Conjunctivae normal.   Neck:      Musculoskeletal: Normal range of motion and neck supple.      Thyroid: No thyromegaly.      Vascular: No carotid bruit or JVD.   Cardiovascular:      Rate and Rhythm: Normal rate and regular rhythm.      Chest Wall: PMI is not displaced.      Pulses:           Carotid pulses are 1+ on the right side and 1+ on the left side.       Dorsalis pedis pulses are 2+ on the right side and 2+ on the left side.      Heart sounds: Normal heart sounds, S1 normal and S2 normal. No murmur. No friction rub. No gallop.    Pulmonary:      Effort: Pulmonary effort is normal. No respiratory distress.      Breath sounds: Normal breath sounds. No wheezing or rales.   Chest:      Chest wall: No tenderness.   Abdominal:      General: Bowel sounds are normal.      Palpations: Abdomen is soft. There is no mass.      Tenderness: There is no abdominal tenderness.   Musculoskeletal: Normal range of motion.      Right lower leg: No edema.      Left lower leg: No edema.   Skin:     General: Skin is warm and dry.      Capillary Refill: Capillary refill takes less than 2 seconds.      Findings: No rash.   Neurological:      General: No focal deficit present.      Mental Status: He is alert and oriented to person, place, and time.   Psychiatric:         Mood and Affect: Mood normal.    "      Behavior: Behavior normal.         Thought Content: Thought content normal.         Judgment: Judgment normal.         Procedures    Results:   Reviewed vital signs blood pressure 110/76, heart rate 88 regular rate and rhythm.  Reviewed medication changes and updated  Reviewed medical, surgical, social history.    Assessment/Plan:   I did offer  referral for dietary counseling for low-salt diet and for diabetes counseling for which he declined.  No further diagnostic testing is indicated with today's visit.  No change in medication regimen.  Follow-up 1 year or as needed for any cardiac related issues.  Miller was seen today for follow-up and hypertension.    Diagnoses and all orders for this visit:    Benign essential hypertension  Blood pressure stable at 110/76.  Cardura 4 mg p.o. daily  Losartan 100 mg p.o. daily  Toprol-XL 50 mg p.o. daily  Mixed hyperlipidemia  Atorvastatin 20 mg p.o. daily    RIGO on CPAP  Has follow-up with his PCP regarding his CPAP, patient reports compliance  Diabetes mellitus without complication (CMS/[facility])  Patient reports stable control of fingerstick glucose however last hemoglobin A1c he reports is 7.2.      Return in about 1 year (around 9/28/2021).    ODETTE Brennan

## 2020-11-12 ENCOUNTER — OFFICE VISIT (OUTPATIENT)
Dept: PULMONOLOGY | Facility: CLINIC | Age: 60
End: 2020-11-12

## 2020-11-12 VITALS
SYSTOLIC BLOOD PRESSURE: 122 MMHG | HEART RATE: 85 BPM | RESPIRATION RATE: 18 BRPM | DIASTOLIC BLOOD PRESSURE: 82 MMHG | BODY MASS INDEX: 46.51 KG/M2 | HEIGHT: 69 IN | OXYGEN SATURATION: 96 % | TEMPERATURE: 96.6 F | WEIGHT: 314 LBS

## 2020-11-12 DIAGNOSIS — G47.33 OSA (OBSTRUCTIVE SLEEP APNEA): Primary | ICD-10-CM

## 2020-11-12 DIAGNOSIS — G47.19 EXCESSIVE DAYTIME SLEEPINESS: ICD-10-CM

## 2020-11-12 PROCEDURE — 99213 OFFICE O/P EST LOW 20 MIN: CPT | Performed by: NURSE PRACTITIONER

## 2020-11-12 NOTE — PROGRESS NOTES
"Chief Complaint   Patient presents with   • Follow-up   • Sleeping Problem         Subjective   Miller Em is a 60 y.o. male.     History of Present Illness   The patient comes in today for follow-up of obstructive sleep apnea.    I reviewed his sleep study and discussed the results with him today.  The sleep study revealed severe sleep apnea with an apnea hypopnea index of 59 per hour.  his apnea-hypopnea index was worse in REM sleep at 64 per hour.    He has been set up with CPAP at a pressure of 8.  He is not having any issues using her tolerating the machine.  He does states that he feels he is not getting enough air at times.  He states he cannot lay sleep using the machine this time as he struggled with that when using CPAP previously.  He feels that he only sleeps about 4 hours a night.  He gets up to go to the bathroom at night and wakes up thirsty.    He has not noticed much difference in how he is feeling except for he is not quite as tired.  He has been trying to exercise some.      The following portions of the patient's history were reviewed and updated as appropriate: allergies, current medications, past family history, past medical history, past social history and past surgical history.      Review of Systems   Constitutional: Negative for chills and fever.   HENT: Negative for rhinorrhea, sinus pressure, sneezing and sore throat.    Respiratory: Negative for cough, chest tightness, shortness of breath and wheezing.    Psychiatric/Behavioral: Negative for sleep disturbance.       Objective   Visit Vitals  /82   Pulse 85   Temp 96.6 °F (35.9 °C)   Resp 18   Ht 175.3 cm (69\")   Wt (!) 142 kg (314 lb)   SpO2 96%   BMI 46.37 kg/m²         Physical Exam  Vitals signs reviewed.   Constitutional:       Appearance: He is well-developed.   HENT:      Head: Atraumatic.      Mouth/Throat:      Mouth: Mucous membranes are moist.      Pharynx: Oropharynx is clear.      Comments: Crowded oropharynx. "   Abdominal:      Comments: Obese abdomen.   Musculoskeletal:      Comments: Gait was normal.   Neurological:      Mental Status: He is alert and oriented to person, place, and time.         Assessment/Plan   Diagnoses and all orders for this visit:    1. RIGO (obstructive sleep apnea) (Primary)  -     BIPAP / CPAP Adjustment    2. Excessive daytime sleepiness           Return in about 5 months (around 4/12/2021) for Recheck, For Dr. Jacques, Sleep ONLY.    DISCUSSION (if any):  Continue treatment with CPAP but will increase the pressure to 10 from 8, with nasal pillows.    Patient's compliance data was reviewed and the compliance is 100%.  He has a residual AHI of 14/h which is another reason I feel an increase in pressure is warranted.    Humidification setup, hose and mask care discussed.  We have discussed changing humidity might help decrease his dry mouth and thirst?  I did explain how to change the humidity setting.    Weight loss advised.    Use every night for at least 4 hours stressed.      Dictated utilizing Dragon dictation.    This document was electronically signed by ODETTE Kaur November 12, 2020  11:29 EST

## 2021-04-07 DIAGNOSIS — E78.00 PURE HYPERCHOLESTEROLEMIA: ICD-10-CM

## 2021-04-07 DIAGNOSIS — M10.9 GOUT, UNSPECIFIED CAUSE, UNSPECIFIED CHRONICITY, UNSPECIFIED SITE: ICD-10-CM

## 2021-04-07 DIAGNOSIS — Z79.4 TYPE 2 DIABETES MELLITUS WITH OTHER CIRCULATORY COMPLICATION, WITH LONG-TERM CURRENT USE OF INSULIN (HCC): ICD-10-CM

## 2021-04-07 DIAGNOSIS — E03.9 ACQUIRED HYPOTHYROIDISM: ICD-10-CM

## 2021-04-07 DIAGNOSIS — R79.89 LOW VITAMIN D LEVEL: ICD-10-CM

## 2021-04-07 DIAGNOSIS — I10 ESSENTIAL HYPERTENSION: ICD-10-CM

## 2021-04-07 DIAGNOSIS — E11.59 TYPE 2 DIABETES MELLITUS WITH OTHER CIRCULATORY COMPLICATION, WITH LONG-TERM CURRENT USE OF INSULIN (HCC): ICD-10-CM

## 2021-04-07 DIAGNOSIS — E21.3 HYPERPARATHYROIDISM (HCC): ICD-10-CM

## 2021-04-07 RX ORDER — DOXAZOSIN MESYLATE 4 MG/1
4 TABLET ORAL DAILY
Qty: 90 TABLET | Refills: 3 | Status: SHIPPED | OUTPATIENT
Start: 2021-04-07 | End: 2021-10-04 | Stop reason: SDUPTHER

## 2021-04-07 RX ORDER — ALLOPURINOL 100 MG/1
100 TABLET ORAL DAILY
Qty: 90 TABLET | Refills: 3 | Status: SHIPPED | OUTPATIENT
Start: 2021-04-07 | End: 2021-10-04 | Stop reason: SDUPTHER

## 2021-04-07 RX ORDER — METOPROLOL SUCCINATE 50 MG/1
50 TABLET, EXTENDED RELEASE ORAL DAILY
Qty: 90 TABLET | Refills: 3 | Status: SHIPPED | OUTPATIENT
Start: 2021-04-07 | End: 2021-10-04 | Stop reason: SDUPTHER

## 2021-04-07 RX ORDER — ATORVASTATIN CALCIUM 20 MG/1
20 TABLET, FILM COATED ORAL NIGHTLY
Qty: 90 TABLET | Refills: 3 | Status: SHIPPED | OUTPATIENT
Start: 2021-04-07 | End: 2021-10-04 | Stop reason: SDUPTHER

## 2021-04-07 RX ORDER — MELATONIN
1000 DAILY
Qty: 90 TABLET | Refills: 3 | Status: SHIPPED | OUTPATIENT
Start: 2021-04-07 | End: 2023-02-09

## 2021-04-07 RX ORDER — LEVOTHYROXINE SODIUM 0.15 MG/1
150 TABLET ORAL DAILY
Qty: 90 TABLET | Refills: 3 | Status: SHIPPED | OUTPATIENT
Start: 2021-04-07 | End: 2021-10-04 | Stop reason: SDUPTHER

## 2021-04-07 RX ORDER — LOSARTAN POTASSIUM 100 MG/1
100 TABLET ORAL DAILY
Qty: 90 TABLET | Refills: 3 | Status: SHIPPED | OUTPATIENT
Start: 2021-04-07 | End: 2021-10-04 | Stop reason: SDUPTHER

## 2021-04-07 RX ORDER — LANCETS
EACH MISCELLANEOUS
Qty: 100 EACH | Refills: 3 | OUTPATIENT
Start: 2021-04-07 | End: 2023-04-04 | Stop reason: SDUPTHER

## 2021-04-13 ENCOUNTER — OFFICE VISIT (OUTPATIENT)
Dept: PULMONOLOGY | Facility: CLINIC | Age: 61
End: 2021-04-13

## 2021-04-13 VITALS
HEART RATE: 77 BPM | WEIGHT: 315 LBS | BODY MASS INDEX: 46.65 KG/M2 | OXYGEN SATURATION: 96 % | RESPIRATION RATE: 16 BRPM | HEIGHT: 69 IN | SYSTOLIC BLOOD PRESSURE: 128 MMHG | DIASTOLIC BLOOD PRESSURE: 78 MMHG

## 2021-04-13 DIAGNOSIS — E66.01 MORBID OBESITY, UNSPECIFIED OBESITY TYPE (HCC): ICD-10-CM

## 2021-04-13 DIAGNOSIS — G47.33 OSA (OBSTRUCTIVE SLEEP APNEA): Primary | ICD-10-CM

## 2021-04-13 PROCEDURE — 99214 OFFICE O/P EST MOD 30 MIN: CPT | Performed by: INTERNAL MEDICINE

## 2021-04-13 NOTE — PROGRESS NOTES
"  Chief Complaint   Patient presents with   • Follow-up   • Sleeping Problem       Subjective   Miller Em is a 60 y.o. male.     History of Present Illness   Patient was evaluated today for follow up of Sleep apnea. Patient does report some initial improvement but now feels that the PAP device is not relieving some of the symptoms.    Patient reports slight worsening in daytime sleepiness when compared to before. Patient is not aware of snoring through the device.     Patient is also having occasional leaks with the mask which makes it hard for him to use the mask on a consistent basis.     Patient says that the compliance with the use of the equipment is good.     The following portions of the patient's history were reviewed and updated as appropriate: allergies, current medications, past family history, past medical history, past social history and past surgical history.    Review of Systems   HENT: Negative for sinus pressure, sneezing and sore throat.    Respiratory: Negative for cough, chest tightness, shortness of breath and wheezing.        Objective   Visit Vitals  /78   Pulse 77   Resp 16   Ht 175.3 cm (69.02\")   Wt (!) 145 kg (320 lb)   SpO2 96%   BMI 47.23 kg/m²       Physical Exam  Vitals reviewed.   Constitutional:       Appearance: He is well-developed.   HENT:      Head: Atraumatic.      Mouth/Throat:      Comments: Oropharynx was crowded.  Neck:      Comments: Increased adipose tissue noted.  Musculoskeletal:      Comments: Gait was normal.   Neurological:      Mental Status: He is alert and oriented to person, place, and time.           Assessment/Plan   Diagnoses and all orders for this visit:    1. RIGO (obstructive sleep apnea) (Primary)  -     BIPAP / CPAP Adjustment    2. Morbid obesity, unspecified obesity type (CMS/HCC)  -     BIPAP / CPAP Adjustment           Return in about 5 months (around 9/13/2021) for Daron/Jordana, ....Also 12 mths w/ Dr. Jacques.    DISCUSSION (if any):  I " reviewed the results of last sleep study in detail. I informed him that the apnea hypopnea index was 59 / hr. REM AHI was 64/hour. Supine AHI was 62/hour. This was an in lab study. This was done in Sept 2020.    I told the patient that his symptoms are consistent with partially controlled sleep apnea.    It appears that some of his issues are secondary to the mask.  The patient was provided with Dream wear full face mask prescription. We will ask the [employer] company to provide him with the same mask to use at home.     The patient appears to have issues with getting used to the mask at night.  I recommended that he uses the mask for 15-30 minutes every day, to get used to it.    If the symptoms do not improve, even after undertaking the above measures, the patient may have to undergo a titration study.    Patient was advised to continue using PAP for at least 4 hours every night.    Patient was advised to call this office with any issues.    I spent a total of 32 minutes face to face with this patient. his pertinent current and previous data, as applicable, were reviewed with the patient. Patient's diagnostic studies were also reviewed, including previous sleep study. We also discussed sleep hygiene measures and measures to increase compliance with the CPAP device.  Time also includes documentation in the chart and reviewing data in the chart.         Dictated utilizing Dragon dictation.    This document was electronically signed by Josue Jacques MD on 04/13/21 at 13:38 EDT

## 2021-09-15 ENCOUNTER — OFFICE VISIT (OUTPATIENT)
Dept: PULMONOLOGY | Facility: CLINIC | Age: 61
End: 2021-09-15

## 2021-09-15 VITALS
RESPIRATION RATE: 18 BRPM | WEIGHT: 315 LBS | HEIGHT: 69 IN | OXYGEN SATURATION: 97 % | HEART RATE: 93 BPM | SYSTOLIC BLOOD PRESSURE: 118 MMHG | DIASTOLIC BLOOD PRESSURE: 80 MMHG | BODY MASS INDEX: 46.65 KG/M2

## 2021-09-15 DIAGNOSIS — G47.19 EXCESSIVE DAYTIME SLEEPINESS: ICD-10-CM

## 2021-09-15 DIAGNOSIS — G47.33 OSA (OBSTRUCTIVE SLEEP APNEA): Primary | ICD-10-CM

## 2021-09-15 DIAGNOSIS — E66.01 MORBID OBESITY, UNSPECIFIED OBESITY TYPE (HCC): ICD-10-CM

## 2021-09-15 PROCEDURE — 99213 OFFICE O/P EST LOW 20 MIN: CPT | Performed by: NURSE PRACTITIONER

## 2021-09-15 NOTE — PROGRESS NOTES
"Chief Complaint   Patient presents with   • Follow-up   • Sleeping Problem         Subjective   Miller Em is a 61 y.o. male.     History of Present Illness   Patient comes back today for follow up of Obstructive Sleep apnea. he doesn't report any issues with the device or mask.     Patient says that he is compliant with his device and using it regularly.    Patient's symptoms of sleep disturbance and daytime sleepiness have been helped greatly with the use of PAP device, as prescribed.  He has not been using the machine for several reasons.  He had some sinus issues and did use it for several weeks.  He has been sent the wrong mask and has had difficulty tolerating the mask.  He also notes that his machine has been recalled so he was not sure if he should use it or not.      The following portions of the patient's history were reviewed and updated as appropriate: allergies, current medications, past family history, past medical history, past social history and past surgical history.      Review of Systems   Constitutional: Negative for chills and fever.   HENT: Negative for rhinorrhea, sinus pressure, sneezing and sore throat.    Respiratory: Negative for cough, shortness of breath and wheezing.    Psychiatric/Behavioral: Positive for sleep disturbance.       Objective   Visit Vitals  /80   Pulse 93   Resp 18   Ht 175.3 cm (69\")   Wt (!) 145 kg (320 lb)   SpO2 97%   BMI 47.26 kg/m²         Physical Exam  Vitals reviewed.   Constitutional:       Appearance: He is well-developed.   HENT:      Head: Atraumatic.      Mouth/Throat:      Mouth: Mucous membranes are moist.      Comments: Crowded oropharynx.   Eyes:      Extraocular Movements: Extraocular movements intact.   Abdominal:      Comments: Obese abdomen.   Musculoskeletal:      Comments: Gait normal.   Skin:     General: Skin is warm.   Neurological:      Mental Status: He is alert and oriented to person, place, and time.             Assessment/Plan "   Diagnoses and all orders for this visit:    1. RIGO (obstructive sleep apnea) (Primary)    2. Morbid obesity, unspecified obesity type (CMS/HCC)    3. Excessive daytime sleepiness           Return for keep appt in April.    DISCUSSION (if any):  Continue treatment with CPAP at a pressure of 10, with a nasal pillows.    The patient was provided with a new set of nasal pillows in the clinic today.    Patient's compliance data was reviewed and he is not compliant at this time.     Humidification setup, hose and mask care discussed.    Weight loss advised.    Use every night for at least 4 hours stressed.    I reviewed his sleep study which showed severe RIGO with an AHI of 59 per hour.     I have recommended he continue using CPAP machine.     Patient was informed about the voluntary recall issued by [employer] for some of their PAP devices.  Patient was also urged to register the device with [employer], on their website, to obtain up-to-date information on repair is a replacement.    It appears that as per the recent recommendations, the patient's need to avoid ozone based cleaning methods and also avoid exposure of the devices to high humidity or high temperatures.    The patient was informed that given the benefit of continuing to use trilogy, AVAPS, BiPAP ST, ASV devices and/or patient's who require oxygen with air PAP devices, at the present time we suggest continuing to use the device and to add inline filter and to consider changing it on a regular basis.      Patients with certain diseases such as cardiovascular diseases, COPD, congestive heart failure, among others, are at high risk for complications if their obstructive sleep apnea remains untreated therefore it is believed that he should continue treatment with CPAP until replacement device can be obtained or device made by different company can be obtained.  he was encouraged to contact their DME company to inquire about available options.    Patient with  "moderate to severe obstructive sleep apnea will also need to continue using the PAP devices due to severity of underlying disease and possible adverse outcomes if PAP device is not used on a regular basis.    It is not possible to make any predictions with regards to increase in the possibility of health related complications, in the event obstructive sleep apnea is not treated even for short time.    Patients were urged to contact their DME company, regarding their concern for their current PAP devices. We informed them, that we can only order \"PAP devices\" but have no control over which brand of device they receive/received from their DME company. A change in the device will have to be determined by their DME company and insurance company.     We will keep following American Academy of sleep medicine and American thoracic Society guidelines and update recommendations as they are released.    A total of 20 minutes was spent reviewing all the information available including reviewing previous history from PCP/sleep specialist, as applicable, reviewing available sleep studies/compliance data.  This also included discussion regarding importance of sleep hygiene and possible lifestyle modifications, if applicable/necessary, that may impact sleep.  Time was also spent documenting information in electronic health record and placing orders, as appropriate and applicable.      Dictated utilizing Dragon dictation.    This document was electronically signed by ODETET Kaur September 15, 2021  13:39 EDT   "

## 2021-09-27 ENCOUNTER — OFFICE VISIT (OUTPATIENT)
Dept: CARDIOLOGY | Facility: CLINIC | Age: 61
End: 2021-09-27

## 2021-09-27 VITALS
HEART RATE: 102 BPM | RESPIRATION RATE: 18 BRPM | HEIGHT: 69 IN | BODY MASS INDEX: 46.65 KG/M2 | DIASTOLIC BLOOD PRESSURE: 82 MMHG | SYSTOLIC BLOOD PRESSURE: 112 MMHG | WEIGHT: 315 LBS | OXYGEN SATURATION: 100 %

## 2021-09-27 DIAGNOSIS — E66.01 MORBID OBESITY (HCC): ICD-10-CM

## 2021-09-27 DIAGNOSIS — I10 BENIGN ESSENTIAL HYPERTENSION: Primary | ICD-10-CM

## 2021-09-27 PROCEDURE — 99213 OFFICE O/P EST LOW 20 MIN: CPT | Performed by: INTERNAL MEDICINE

## 2021-09-27 NOTE — PROGRESS NOTES
Subjective:     Encounter Date:09/27/2021      Patient ID: Miller Em is a 61 y.o. male.    Chief Complaint: Hypertension  HPI  This is a 61-year-old male patient who presents to cardiology clinic for routine follow-up.  The patient indicates he is no longer smoking.  He continues to drink 6-7 alcoholic beverages per week.  He reports compliance with his medications.  He is remained active with no exertional symptoms or limitations.  The following portions of the patient's history were reviewed and updated as appropriate: allergies, current medications, past family history, past medical history, past social history, past surgical history and problem  Review of Systems   Constitutional: Negative for chills, diaphoresis, fever, malaise/fatigue, weight gain and weight loss.   HENT: Negative for ear discharge, hearing loss, hoarse voice and nosebleeds.    Eyes: Negative for discharge, double vision, pain and photophobia.   Cardiovascular: Negative for chest pain, claudication, cyanosis, dyspnea on exertion, irregular heartbeat, leg swelling, near-syncope, orthopnea, palpitations, paroxysmal nocturnal dyspnea and syncope.   Respiratory: Negative for cough, hemoptysis, shortness of breath, sputum production and wheezing.    Endocrine: Negative for cold intolerance, heat intolerance, polydipsia, polyphagia and polyuria.   Hematologic/Lymphatic: Negative for adenopathy and bleeding problem. Does not bruise/bleed easily.   Skin: Negative for color change, flushing, itching and rash.   Musculoskeletal: Negative for muscle cramps, muscle weakness, myalgias and stiffness.   Gastrointestinal: Negative for abdominal pain, diarrhea, hematemesis, hematochezia, nausea and vomiting.   Genitourinary: Negative for dysuria, frequency and nocturia.   Neurological: Negative for focal weakness, loss of balance, numbness, paresthesias and seizures.   Psychiatric/Behavioral: Negative for altered mental status, hallucinations and  suicidal ideas.   Allergic/Immunologic: Negative for HIV exposure, hives and persistent infections.           Current Outpatient Medications:   •  Accu-Chek FastClix Lancets [facility], TEST DAILY FOR DM E11.9, Disp: 100 each, Rfl: 3  •  allopurinol (ZYLOPRIM) 100 MG tablet, Take 1 tablet by mouth Daily., Disp: 90 tablet, Rfl: 3  •  aspirin 81 MG EC tablet, Take 1 tablet by mouth Daily., Disp: 30 tablet, Rfl: 11  •  atorvastatin (LIPITOR) 20 MG tablet, Take 1 tablet by mouth Every Night., Disp: 90 tablet, Rfl: 3  •  cholecalciferol (VITAMIN D3) 25 MCG (1000 UT) tablet, Take 1 tablet by mouth Daily., Disp: 90 tablet, Rfl: 3  •  doxazosin (CARDURA) 4 MG tablet, Take 1 tablet by mouth Daily., Disp: 90 tablet, Rfl: 3  •  glucose blood test strip, Patient tests blood sugar one time daily.  Diagnosis code E11.9, Disp: 100 each, Rfl: 3  •  glucose monitor monitoring kit, 1 each As Needed (bs monitor)., Disp: 1 each, Rfl: 1  •  levothyroxine (SYNTHROID, LEVOTHROID) 150 MCG tablet, Take 1 tablet by mouth Daily., Disp: 90 tablet, Rfl: 3  •  losartan (COZAAR) 100 MG tablet, Take 1 tablet by mouth Daily., Disp: 90 tablet, Rfl: 3  •  metFORMIN (GLUCOPHAGE) 500 MG tablet, Take 1 tablet by mouth Daily With Breakfast., Disp: 90 tablet, Rfl: 3  •  metoprolol succinate XL (TOPROL-XL) 50 MG 24 hr tablet, Take 1 tablet by mouth Daily., Disp: 90 tablet, Rfl: 3    Objective:   Vitals and nursing note reviewed.   Constitutional:       Appearance: Healthy appearance. Not in distress.   Neck:      Vascular: No JVR. JVD normal.   Pulmonary:      Effort: Pulmonary effort is normal.      Breath sounds: Normal breath sounds. No wheezing. No rhonchi. No rales.   Chest:      Chest wall: Not tender to palpatation.   Cardiovascular:      PMI at left midclavicular line. Normal rate. Regular rhythm. Normal S1. Normal S2.      Murmurs: There is no murmur.      No gallop. No click. No rub.   Pulses:     Intact distal pulses.   Edema:     Peripheral edema  "absent.   Abdominal:      General: Bowel sounds are normal.      Palpations: Abdomen is soft.      Tenderness: There is no abdominal tenderness.   Musculoskeletal: Normal range of motion.         General: No tenderness. Skin:     General: Skin is warm and dry.   Neurological:      General: No focal deficit present.      Mental Status: Alert and oriented to person, place and time.       Blood pressure 112/82, pulse 102, resp. rate 18, height 175.3 cm (69\"), weight (!) 147 kg (324 lb), SpO2 100 %.   Lab Review:     Assessment:       1. Benign essential hypertension  Acceptable blood pressure control.    2. Morbid obesity (CMS/HCC)  Body mass index is now approaching 48.    Procedures    Plan:     No changes in his medications are indicated at this time.  No cardiovascular testing is warranted at this time.  The importance of diet exercise and weight management has been emphasized to the patient.      "

## 2021-10-04 DIAGNOSIS — Z79.4 TYPE 2 DIABETES MELLITUS WITH OTHER CIRCULATORY COMPLICATION, WITH LONG-TERM CURRENT USE OF INSULIN (HCC): ICD-10-CM

## 2021-10-04 DIAGNOSIS — I10 ESSENTIAL HYPERTENSION: ICD-10-CM

## 2021-10-04 DIAGNOSIS — E03.9 ACQUIRED HYPOTHYROIDISM: ICD-10-CM

## 2021-10-04 DIAGNOSIS — E78.00 PURE HYPERCHOLESTEROLEMIA: ICD-10-CM

## 2021-10-04 DIAGNOSIS — E11.59 TYPE 2 DIABETES MELLITUS WITH OTHER CIRCULATORY COMPLICATION, WITH LONG-TERM CURRENT USE OF INSULIN (HCC): ICD-10-CM

## 2021-10-04 DIAGNOSIS — M10.9 GOUT, UNSPECIFIED CAUSE, UNSPECIFIED CHRONICITY, UNSPECIFIED SITE: ICD-10-CM

## 2021-10-04 DIAGNOSIS — E21.3 HYPERPARATHYROIDISM (HCC): ICD-10-CM

## 2021-10-05 RX ORDER — LEVOTHYROXINE SODIUM 0.15 MG/1
150 TABLET ORAL DAILY
Qty: 30 TABLET | Refills: 1 | Status: SHIPPED | OUTPATIENT
Start: 2021-10-05 | End: 2021-12-20 | Stop reason: SDUPTHER

## 2021-10-05 RX ORDER — DOXAZOSIN MESYLATE 4 MG/1
4 TABLET ORAL DAILY
Qty: 30 TABLET | Refills: 1 | Status: SHIPPED | OUTPATIENT
Start: 2021-10-05 | End: 2021-12-20 | Stop reason: SDUPTHER

## 2021-10-05 RX ORDER — LOSARTAN POTASSIUM 100 MG/1
100 TABLET ORAL DAILY
Qty: 30 TABLET | Refills: 1 | Status: SHIPPED | OUTPATIENT
Start: 2021-10-05 | End: 2021-12-20 | Stop reason: SDUPTHER

## 2021-10-05 RX ORDER — METOPROLOL SUCCINATE 50 MG/1
50 TABLET, EXTENDED RELEASE ORAL DAILY
Qty: 30 TABLET | Refills: 1 | Status: SHIPPED | OUTPATIENT
Start: 2021-10-05 | End: 2021-12-20 | Stop reason: SDUPTHER

## 2021-10-05 RX ORDER — ATORVASTATIN CALCIUM 20 MG/1
20 TABLET, FILM COATED ORAL NIGHTLY
Qty: 30 TABLET | Refills: 1 | Status: SHIPPED | OUTPATIENT
Start: 2021-10-05 | End: 2021-12-20 | Stop reason: SDUPTHER

## 2021-10-05 RX ORDER — ALLOPURINOL 100 MG/1
100 TABLET ORAL DAILY
Qty: 30 TABLET | Refills: 1 | Status: SHIPPED | OUTPATIENT
Start: 2021-10-05 | End: 2021-11-16 | Stop reason: SDUPTHER

## 2021-10-05 NOTE — TELEPHONE ENCOUNTER
Rx Refill Note  Requested Prescriptions     Pending Prescriptions Disp Refills   • metoprolol succinate XL (TOPROL-XL) 50 MG 24 hr tablet 90 tablet 3     Sig: Take 1 tablet by mouth Daily.   • metFORMIN (GLUCOPHAGE) 500 MG tablet 90 tablet 3     Sig: Take 1 tablet by mouth Daily With Breakfast.   • losartan (COZAAR) 100 MG tablet 90 tablet 3     Sig: Take 1 tablet by mouth Daily.   • doxazosin (CARDURA) 4 MG tablet 90 tablet 3     Sig: Take 1 tablet by mouth Daily.   • allopurinol (ZYLOPRIM) 100 MG tablet 90 tablet 3     Sig: Take 1 tablet by mouth Daily.   • atorvastatin (LIPITOR) 20 MG tablet 90 tablet 3     Sig: Take 1 tablet by mouth Every Night.   • levothyroxine (SYNTHROID, LEVOTHROID) 150 MCG tablet 90 tablet 3     Sig: Take 1 tablet by mouth Daily.      Last office visit with prescribing clinician: 6/12/2020      Next office visit with prescribing clinician: Visit date not found            Lorrie Schwarz MA  10/05/21, 17:30 EDT     Prescription adjusted to reflect 30 days with 1 refills until scheduled appointment and then year's supply will be given

## 2021-11-12 ENCOUNTER — TELEPHONE (OUTPATIENT)
Dept: INTERNAL MEDICINE | Facility: CLINIC | Age: 61
End: 2021-11-12

## 2021-11-12 DIAGNOSIS — Z79.4 TYPE 2 DIABETES MELLITUS WITH OTHER CIRCULATORY COMPLICATION, WITH LONG-TERM CURRENT USE OF INSULIN (HCC): Primary | ICD-10-CM

## 2021-11-12 DIAGNOSIS — M10.9 GOUT, UNSPECIFIED CAUSE, UNSPECIFIED CHRONICITY, UNSPECIFIED SITE: ICD-10-CM

## 2021-11-12 DIAGNOSIS — Z12.5 PROSTATE CANCER SCREENING: ICD-10-CM

## 2021-11-12 DIAGNOSIS — E11.59 TYPE 2 DIABETES MELLITUS WITH OTHER CIRCULATORY COMPLICATION, WITH LONG-TERM CURRENT USE OF INSULIN (HCC): Primary | ICD-10-CM

## 2021-11-12 DIAGNOSIS — E03.9 ACQUIRED HYPOTHYROIDISM: ICD-10-CM

## 2021-11-12 DIAGNOSIS — I10 ESSENTIAL HYPERTENSION: ICD-10-CM

## 2021-11-12 NOTE — TELEPHONE ENCOUNTER
Pt came by for some labs down stairs, next appointment is on 11/16/21 but there are no orders placed.    Please advise.

## 2021-11-16 ENCOUNTER — OFFICE VISIT (OUTPATIENT)
Dept: INTERNAL MEDICINE | Facility: CLINIC | Age: 61
End: 2021-11-16

## 2021-11-16 VITALS
RESPIRATION RATE: 16 BRPM | HEART RATE: 83 BPM | TEMPERATURE: 98.2 F | HEIGHT: 69 IN | OXYGEN SATURATION: 98 % | SYSTOLIC BLOOD PRESSURE: 128 MMHG | WEIGHT: 315 LBS | BODY MASS INDEX: 46.65 KG/M2 | DIASTOLIC BLOOD PRESSURE: 86 MMHG

## 2021-11-16 DIAGNOSIS — I10 HYPERTENSION, UNSPECIFIED TYPE: ICD-10-CM

## 2021-11-16 DIAGNOSIS — N18.2 CRI (CHRONIC RENAL INSUFFICIENCY), STAGE 2 (MILD): ICD-10-CM

## 2021-11-16 DIAGNOSIS — E79.0 HYPERURICEMIA: ICD-10-CM

## 2021-11-16 DIAGNOSIS — H57.9 ABNORMAL FINDING OF EYE: ICD-10-CM

## 2021-11-16 DIAGNOSIS — H69.83 EUSTACHIAN TUBE DYSFUNCTION, BILATERAL: ICD-10-CM

## 2021-11-16 DIAGNOSIS — M10.9 GOUT, UNSPECIFIED CAUSE, UNSPECIFIED CHRONICITY, UNSPECIFIED SITE: ICD-10-CM

## 2021-11-16 DIAGNOSIS — M19.90 ARTHRITIS: ICD-10-CM

## 2021-11-16 DIAGNOSIS — E83.52 HYPERCALCEMIA: ICD-10-CM

## 2021-11-16 DIAGNOSIS — E78.2 MIXED HYPERLIPIDEMIA: ICD-10-CM

## 2021-11-16 DIAGNOSIS — Z23 NEED FOR INFLUENZA VACCINATION: Primary | ICD-10-CM

## 2021-11-16 LAB
ALBUMIN SERPL-MCNC: 4.6 G/DL (ref 3.5–5.2)
ALBUMIN/GLOB SERPL: 2.3 G/DL
ALP SERPL-CCNC: 69 U/L (ref 39–117)
ALT SERPL-CCNC: 37 U/L (ref 1–41)
AST SERPL-CCNC: 25 U/L (ref 1–40)
BASOPHILS # BLD AUTO: 0.08 10*3/MM3 (ref 0–0.2)
BASOPHILS NFR BLD AUTO: 1.7 % (ref 0–1.5)
BILIRUB SERPL-MCNC: 0.3 MG/DL (ref 0–1.2)
BUN SERPL-MCNC: 13 MG/DL (ref 8–23)
BUN/CREAT SERPL: 10.2 (ref 7–25)
CALCIUM SERPL-MCNC: 11.3 MG/DL (ref 8.6–10.5)
CHLORIDE SERPL-SCNC: 107 MMOL/L (ref 98–107)
CHOLEST SERPL-MCNC: 134 MG/DL (ref 0–200)
CO2 SERPL-SCNC: 26.4 MMOL/L (ref 22–29)
CREAT SERPL-MCNC: 1.28 MG/DL (ref 0.76–1.27)
EOSINOPHIL # BLD AUTO: 0.16 10*3/MM3 (ref 0–0.4)
EOSINOPHIL NFR BLD AUTO: 3.4 % (ref 0.3–6.2)
ERYTHROCYTE [DISTWIDTH] IN BLOOD BY AUTOMATED COUNT: 15.6 % (ref 12.3–15.4)
GLOBULIN SER CALC-MCNC: 2 GM/DL
GLUCOSE SERPL-MCNC: 112 MG/DL (ref 65–99)
HBA1C MFR BLD: 7.1 % (ref 4.8–5.6)
HCT VFR BLD AUTO: 46.8 % (ref 37.5–51)
HDLC SERPL-MCNC: 35 MG/DL (ref 40–60)
HGB BLD-MCNC: 15.1 G/DL (ref 13–17.7)
IMM GRANULOCYTES # BLD AUTO: 0.02 10*3/MM3 (ref 0–0.05)
IMM GRANULOCYTES NFR BLD AUTO: 0.4 % (ref 0–0.5)
LDLC SERPL CALC-MCNC: 76 MG/DL (ref 0–100)
LYMPHOCYTES # BLD AUTO: 1.95 10*3/MM3 (ref 0.7–3.1)
LYMPHOCYTES NFR BLD AUTO: 41.9 % (ref 19.6–45.3)
MCH RBC QN AUTO: 27 PG (ref 26.6–33)
MCHC RBC AUTO-ENTMCNC: 32.3 G/DL (ref 31.5–35.7)
MCV RBC AUTO: 83.6 FL (ref 79–97)
MICROALBUMIN UR-MCNC: 50.3 UG/ML
MONOCYTES # BLD AUTO: 0.54 10*3/MM3 (ref 0.1–0.9)
MONOCYTES NFR BLD AUTO: 11.6 % (ref 5–12)
NEUTROPHILS # BLD AUTO: 1.9 10*3/MM3 (ref 1.7–7)
NEUTROPHILS NFR BLD AUTO: 41 % (ref 42.7–76)
NRBC BLD AUTO-RTO: 0 /100 WBC (ref 0–0.2)
PLATELET # BLD AUTO: 179 10*3/MM3 (ref 140–450)
POTASSIUM SERPL-SCNC: 4.3 MMOL/L (ref 3.5–5.2)
PROT SERPL-MCNC: 6.6 G/DL (ref 6–8.5)
PSA SERPL-MCNC: 1.8 NG/ML (ref 0–4)
RBC # BLD AUTO: 5.6 10*6/MM3 (ref 4.14–5.8)
SODIUM SERPL-SCNC: 143 MMOL/L (ref 136–145)
T4 FREE SERPL-MCNC: 1.44 NG/DL (ref 0.93–1.7)
TRIGL SERPL-MCNC: 126 MG/DL (ref 0–150)
TSH SERPL DL<=0.005 MIU/L-ACNC: 1.89 UIU/ML (ref 0.27–4.2)
URATE SERPL-MCNC: 6.3 MG/DL (ref 3.4–7)
VIT B12 SERPL-MCNC: 420 PG/ML (ref 211–946)
VLDLC SERPL CALC-MCNC: 23 MG/DL (ref 5–40)
WBC # BLD AUTO: 4.65 10*3/MM3 (ref 3.4–10.8)

## 2021-11-16 PROCEDURE — 90471 IMMUNIZATION ADMIN: CPT | Performed by: INTERNAL MEDICINE

## 2021-11-16 PROCEDURE — 90686 IIV4 VACC NO PRSV 0.5 ML IM: CPT | Performed by: INTERNAL MEDICINE

## 2021-11-16 PROCEDURE — 99396 PREV VISIT EST AGE 40-64: CPT | Performed by: INTERNAL MEDICINE

## 2021-11-16 RX ORDER — ALLOPURINOL 100 MG/1
200 TABLET ORAL DAILY
Qty: 60 TABLET | Refills: 11 | Status: SHIPPED | OUTPATIENT
Start: 2021-11-16 | End: 2022-12-01 | Stop reason: SDUPTHER

## 2021-11-16 RX ORDER — ORAL SEMAGLUTIDE 3 MG/1
3 TABLET ORAL DAILY
Qty: 30 TABLET | Refills: 11 | Status: SHIPPED | OUTPATIENT
Start: 2021-11-16 | End: 2022-09-28

## 2021-11-16 RX ORDER — HYDROCHLOROTHIAZIDE 12.5 MG/1
12.5 TABLET ORAL DAILY
Qty: 90 TABLET | Refills: 3 | Status: SHIPPED | OUTPATIENT
Start: 2021-11-16 | End: 2022-10-25 | Stop reason: SDUPTHER

## 2021-11-16 RX ORDER — FLUTICASONE PROPIONATE 50 MCG
2 SPRAY, SUSPENSION (ML) NASAL DAILY
Qty: 16 G | Refills: 11 | Status: SHIPPED | OUTPATIENT
Start: 2021-11-16

## 2021-11-16 NOTE — PROGRESS NOTES
Subjective     Patient ID: Miller Em is a 61 y.o. male. Patient is here for management of multiple medical problems.     Chief Complaint   Patient presents with   • Annual Exam     History of Present Illness   Annual exam.     Feels well. Started exercising again.      The following portions of the patient's history were reviewed and updated as appropriate: allergies, current medications, past family history, past medical history, past social history, past surgical history and problem list.    Review of Systems   Constitutional: Positive for fatigue.   Musculoskeletal: Positive for arthralgias.   All other systems reviewed and are negative.      Current Outpatient Medications:   •  Accu-Chek FastClix Lancets [facility], TEST DAILY FOR DM E11.9, Disp: 100 each, Rfl: 3  •  allopurinol (ZYLOPRIM) 100 MG tablet, Take 2 tablets by mouth Daily., Disp: 60 tablet, Rfl: 11  •  aspirin 81 MG EC tablet, Take 1 tablet by mouth Daily., Disp: 30 tablet, Rfl: 11  •  atorvastatin (LIPITOR) 20 MG tablet, Take 1 tablet by mouth Every Night., Disp: 30 tablet, Rfl: 1  •  cholecalciferol (VITAMIN D3) 25 MCG (1000 UT) tablet, Take 1 tablet by mouth Daily., Disp: 90 tablet, Rfl: 3  •  doxazosin (CARDURA) 4 MG tablet, Take 1 tablet by mouth Daily., Disp: 30 tablet, Rfl: 1  •  glucose blood test strip, Patient tests blood sugar one time daily.  Diagnosis code E11.9, Disp: 100 each, Rfl: 3  •  glucose monitor monitoring kit, 1 each As Needed (bs monitor)., Disp: 1 each, Rfl: 1  •  levothyroxine (SYNTHROID, LEVOTHROID) 150 MCG tablet, Take 1 tablet by mouth Daily., Disp: 30 tablet, Rfl: 1  •  losartan (COZAAR) 100 MG tablet, Take 1 tablet by mouth Daily., Disp: 30 tablet, Rfl: 1  •  metFORMIN (GLUCOPHAGE) 500 MG tablet, Take 1 tablet by mouth Daily With Breakfast., Disp: 30 tablet, Rfl: 1  •  metoprolol succinate XL (TOPROL-XL) 50 MG 24 hr tablet, Take 1 tablet by mouth Daily., Disp: 30 tablet, Rfl: 1  •  fluticasone (Flonase) 50 MCG/ACT  "nasal spray, 2 sprays into the nostril(s) as directed by provider Daily., Disp: 16 g, Rfl: 11  •  hydroCHLOROthiazide (HYDRODIURIL) 12.5 MG tablet, Take 1 tablet by mouth Daily., Disp: 90 tablet, Rfl: 3  •  Semaglutide (Rybelsus) 3 MG tablet, Take 1 tablet by mouth Daily., Disp: 30 tablet, Rfl: 11    Objective      Blood pressure 128/86, pulse 83, temperature 98.2 °F (36.8 °C), resp. rate 16, height 175.3 cm (69\"), weight (!) 147 kg (324 lb), SpO2 98 %.    Physical Exam     General Appearance:    Alert, cooperative, no distress, appears stated age   Head:    Normocephalic, without obvious abnormality, atraumatic   Eyes:    PERRL, conjunctiva/corneas clear, EOM's intact   Ears:    Normal TM's and external ear canals, both ears   Nose:   Nares normal, septum midline, mucosa normal, no drainage   or sinus tenderness   Throat:   Lips, mucosa, and tongue normal; teeth and gums normal   Neck:   Supple, symmetrical, trachea midline, no adenopathy;        thyroid:  No enlargement/tenderness/nodules; no carotid    bruit or JVD   Back:     Symmetric, no curvature, ROM normal, no CVA tenderness   Lungs:     Clear to auscultation bilaterally, respirations unlabored   Chest wall:    No tenderness or deformity   Heart:    Regular rate and rhythm, S1 and S2 normal, no murmur,        rub or gallop   Abdomen:     Soft, non-tender, bowel sounds active all four quadrants,     no masses, no organomegaly   Extremities:   Extremities normal, atraumatic, no cyanosis or edema   Pulses:   2+ and symmetric all extremities   Skin:   Skin color, texture, turgor normal, no rashes or lesions   Lymph nodes:   Cervical, supraclavicular, and axillary nodes normal   Neurologic:   CNII-XII intact. Normal strength, sensation and reflexes       throughout      Results for orders placed or performed in visit on 11/12/21   Uric Acid    Specimen: Blood   Result Value Ref Range    Uric Acid 6.3 3.4 - 7.0 mg/dL   PSA Screen    Specimen: Blood   Result Value " Ref Range    PSA 1.800 0.000 - 4.000 ng/mL   Lipid Panel    Specimen: Blood   Result Value Ref Range    Total Cholesterol 134 0 - 200 mg/dL    Triglycerides 126 0 - 150 mg/dL    HDL Cholesterol 35 (L) 40 - 60 mg/dL    VLDL Cholesterol Paulino 23 5 - 40 mg/dL    LDL Chol Calc (NIH) 76 0 - 100 mg/dL   Vitamin B12    Specimen: Blood   Result Value Ref Range    Vitamin B-12 420 211 - 946 pg/mL   Comprehensive Metabolic Panel    Specimen: Blood   Result Value Ref Range    Glucose 112 (H) 65 - 99 mg/dL    BUN 13 8 - 23 mg/dL    Creatinine 1.28 (H) 0.76 - 1.27 mg/dL    eGFR Non African Am 57 (L) >60 mL/min/1.73    eGFR African Am 69 >60 mL/min/1.73    BUN/Creatinine Ratio 10.2 7.0 - 25.0    Sodium 143 136 - 145 mmol/L    Potassium 4.3 3.5 - 5.2 mmol/L    Chloride 107 98 - 107 mmol/L    Total CO2 26.4 22.0 - 29.0 mmol/L    Calcium 11.3 (H) 8.6 - 10.5 mg/dL    Total Protein 6.6 6.0 - 8.5 g/dL    Albumin 4.60 3.50 - 5.20 g/dL    Globulin 2.0 gm/dL    A/G Ratio 2.3 g/dL    Total Bilirubin 0.3 0.0 - 1.2 mg/dL    Alkaline Phosphatase 69 39 - 117 U/L    AST (SGOT) 25 1 - 40 U/L    ALT (SGPT) 37 1 - 41 U/L   TSH    Specimen: Blood   Result Value Ref Range    TSH 1.890 0.270 - 4.200 uIU/mL   T4, Free    Specimen: Blood   Result Value Ref Range    Free T4 1.44 0.93 - 1.70 ng/dL   Hemoglobin A1c    Specimen: Blood   Result Value Ref Range    Hemoglobin A1C 7.10 (H) 4.80 - 5.60 %   MicroAlbumin, Urine, Random - Urine, Clean Catch    Specimen: Urine, Clean Catch   Result Value Ref Range    Microalbumin, Urine     CBC & Differential    Specimen: Blood   Result Value Ref Range    WBC 4.65 3.40 - 10.80 10*3/mm3    RBC 5.60 4.14 - 5.80 10*6/mm3    Hemoglobin 15.1 13.0 - 17.7 g/dL    Hematocrit 46.8 37.5 - 51.0 %    MCV 83.6 79.0 - 97.0 fL    MCH 27.0 26.6 - 33.0 pg    MCHC 32.3 31.5 - 35.7 g/dL    RDW 15.6 (H) 12.3 - 15.4 %    Platelets 179 140 - 450 10*3/mm3    Neutrophil Rel % 41.0 (L) 42.7 - 76.0 %    Lymphocyte Rel % 41.9 19.6 - 45.3 %     Monocyte Rel % 11.6 5.0 - 12.0 %    Eosinophil Rel % 3.4 0.3 - 6.2 %    Basophil Rel % 1.7 (H) 0.0 - 1.5 %    Neutrophils Absolute 1.90 1.70 - 7.00 10*3/mm3    Lymphocytes Absolute 1.95 0.70 - 3.10 10*3/mm3    Monocytes Absolute 0.54 0.10 - 0.90 10*3/mm3    Eosinophils Absolute 0.16 0.00 - 0.40 10*3/mm3    Basophils Absolute 0.08 0.00 - 0.20 10*3/mm3    Immature Granulocyte Rel % 0.4 0.0 - 0.5 %    Immature Grans Absolute 0.02 0.00 - 0.05 10*3/mm3    nRBC 0.0 0.0 - 0.2 /100 WBC         Assessment/Plan   Diet still with needs to improve    Exercise getting better slowly.    Hypercalcemia.    Cri        Diagnoses and all orders for this visit:    1. Need for influenza vaccination (Primary)  -     FluLaval/Fluarix/Fluzone >6 Months ([id number])    2. Hypertension, unspecified type    3. Mixed hyperlipidemia    4. Arthritis  -     Kappa / Lambda Light Chains, Urine, 24 Hour - Urine, Clean Catch  -     Protein Elec + Interp, Serum    5. Hyperuricemia  -     Kappa / Lambda Light Chains, Urine, 24 Hour - Urine, Clean Catch  -     Protein Elec + Interp, Serum  -     Immunoglobulin Free LT Chains Blood  -     ROHAN + PE    6. CRI (chronic renal insufficiency), stage 2 (mild)  -     Leroy / Lambda Light Chains, Urine, 24 Hour - Urine, Clean Catch  -     Protein Elec + Interp, Serum  -     Immunoglobulin Free LT Chains Blood  -     ROHAN + PE    7. Gout, unspecified cause, unspecified chronicity, unspecified site  -     allopurinol (ZYLOPRIM) 100 MG tablet; Take 2 tablets by mouth Daily.  Dispense: 60 tablet; Refill: 11  -     Immunoglobulin Free LT Chains Blood  -     ROHAN + PE    8. Hypercalcemia  -     Immunoglobulin Free LT Chains Blood  -     ROHAN + PE    9. Eustachian tube dysfunction, bilateral    Other orders  -     hydroCHLOROthiazide (HYDRODIURIL) 12.5 MG tablet; Take 1 tablet by mouth Daily.  Dispense: 90 tablet; Refill: 3  -     Semaglutide (Rybelsus) 3 MG tablet; Take 1 tablet by mouth Daily.  Dispense: 30 tablet;  Refill: 11  -     fluticasone (Flonase) 50 MCG/ACT nasal spray; 2 sprays into the nostril(s) as directed by provider Daily.  Dispense: 16 g; Refill: 11      Return in about 6 weeks (around 12/28/2021).          There are no Patient Instructions on file for this visit.     Praful Rapp MD    Assessment/Plan

## 2021-11-17 LAB
ALBUMIN SERPL ELPH-MCNC: 4 G/DL (ref 2.9–4.4)
ALBUMIN SERPL ELPH-MCNC: 4.1 G/DL (ref 2.9–4.4)
ALBUMIN/GLOB SERPL: 1.3 {RATIO} (ref 0.7–1.7)
ALBUMIN/GLOB SERPL: 1.6 {RATIO} (ref 0.7–1.7)
ALPHA1 GLOB SERPL ELPH-MCNC: 0.1 G/DL (ref 0–0.4)
ALPHA1 GLOB SERPL ELPH-MCNC: 0.2 G/DL (ref 0–0.4)
ALPHA2 GLOB SERPL ELPH-MCNC: 0.7 G/DL (ref 0.4–1)
ALPHA2 GLOB SERPL ELPH-MCNC: 0.8 G/DL (ref 0.4–1)
B-GLOBULIN SERPL ELPH-MCNC: 1 G/DL (ref 0.7–1.3)
B-GLOBULIN SERPL ELPH-MCNC: 1.1 G/DL (ref 0.7–1.3)
GAMMA GLOB SERPL ELPH-MCNC: 0.8 G/DL (ref 0.4–1.8)
GAMMA GLOB SERPL ELPH-MCNC: 0.9 G/DL (ref 0.4–1.8)
GLOBULIN SER CALC-MCNC: 3 G/DL (ref 2.2–3.9)
GLOBULIN SER-MCNC: 2.6 G/DL (ref 2.2–3.9)
IGA SERPL-MCNC: 116 MG/DL (ref 61–437)
IGG SERPL-MCNC: 796 MG/DL (ref 603–1613)
IGM SERPL-MCNC: 36 MG/DL (ref 20–172)
INTERPRETATION SERPL IEP-IMP: NORMAL
KAPPA LC FREE SER-MCNC: 16.3 MG/L (ref 3.3–19.4)
KAPPA LC FREE/LAMBDA FREE SER: 0.88 {RATIO} (ref 0.26–1.65)
LABORATORY COMMENT REPORT: NORMAL
LABORATORY COMMENT REPORT: NORMAL
LAMBDA LC FREE SERPL-MCNC: 18.5 MG/L (ref 5.7–26.3)
M PROTEIN SERPL ELPH-MCNC: NORMAL G/DL
M PROTEIN SERPL ELPH-MCNC: NORMAL G/DL
PROT PATTERN SERPL ELPH-IMP: NORMAL
PROT SERPL-MCNC: 6.7 G/DL (ref 6–8.5)
PROT SERPL-MCNC: 7 G/DL (ref 6–8.5)

## 2021-11-19 LAB
KAPPA LC FREE 24H UR-MRATE: 8.82 MG/24 HR
KAPPA LC UR-MCNC: 5.04 MG/L (ref 0.63–113.79)
KAPPA LC/LAMBDA UR: 4.89 {RATIO} (ref 1.03–31.76)
LAMBDA LC FREE 24H UR-MRATE: 1.8 MG/24 HR
LAMBDA LC UR-MCNC: 1.03 MG/L (ref 0.47–11.77)

## 2021-12-20 DIAGNOSIS — E11.59 TYPE 2 DIABETES MELLITUS WITH OTHER CIRCULATORY COMPLICATION, WITH LONG-TERM CURRENT USE OF INSULIN (HCC): ICD-10-CM

## 2021-12-20 DIAGNOSIS — E03.9 ACQUIRED HYPOTHYROIDISM: ICD-10-CM

## 2021-12-20 DIAGNOSIS — I10 ESSENTIAL HYPERTENSION: ICD-10-CM

## 2021-12-20 DIAGNOSIS — E78.00 PURE HYPERCHOLESTEROLEMIA: ICD-10-CM

## 2021-12-20 DIAGNOSIS — Z79.4 TYPE 2 DIABETES MELLITUS WITH OTHER CIRCULATORY COMPLICATION, WITH LONG-TERM CURRENT USE OF INSULIN (HCC): ICD-10-CM

## 2021-12-20 DIAGNOSIS — E21.3 HYPERPARATHYROIDISM (HCC): ICD-10-CM

## 2021-12-20 RX ORDER — DOXAZOSIN MESYLATE 4 MG/1
4 TABLET ORAL DAILY
Qty: 30 TABLET | Refills: 1 | Status: SHIPPED | OUTPATIENT
Start: 2021-12-20 | End: 2022-02-21

## 2021-12-20 RX ORDER — ATORVASTATIN CALCIUM 20 MG/1
20 TABLET, FILM COATED ORAL NIGHTLY
Qty: 30 TABLET | Refills: 1 | Status: SHIPPED | OUTPATIENT
Start: 2021-12-20 | End: 2022-02-21

## 2021-12-20 RX ORDER — LOSARTAN POTASSIUM 100 MG/1
100 TABLET ORAL DAILY
Qty: 30 TABLET | Refills: 1 | Status: SHIPPED | OUTPATIENT
Start: 2021-12-20 | End: 2022-02-21

## 2021-12-20 RX ORDER — LEVOTHYROXINE SODIUM 0.15 MG/1
150 TABLET ORAL DAILY
Qty: 30 TABLET | Refills: 1 | Status: SHIPPED | OUTPATIENT
Start: 2021-12-20 | End: 2022-02-21

## 2021-12-20 RX ORDER — METOPROLOL SUCCINATE 50 MG/1
50 TABLET, EXTENDED RELEASE ORAL DAILY
Qty: 30 TABLET | Refills: 1 | Status: SHIPPED | OUTPATIENT
Start: 2021-12-20 | End: 2022-02-21

## 2022-02-21 DIAGNOSIS — I10 ESSENTIAL HYPERTENSION: ICD-10-CM

## 2022-02-21 DIAGNOSIS — E21.3 HYPERPARATHYROIDISM: ICD-10-CM

## 2022-02-21 DIAGNOSIS — Z79.4 TYPE 2 DIABETES MELLITUS WITH OTHER CIRCULATORY COMPLICATION, WITH LONG-TERM CURRENT USE OF INSULIN: ICD-10-CM

## 2022-02-21 DIAGNOSIS — E11.59 TYPE 2 DIABETES MELLITUS WITH OTHER CIRCULATORY COMPLICATION, WITH LONG-TERM CURRENT USE OF INSULIN: ICD-10-CM

## 2022-02-21 DIAGNOSIS — E03.9 ACQUIRED HYPOTHYROIDISM: ICD-10-CM

## 2022-02-21 DIAGNOSIS — E78.00 PURE HYPERCHOLESTEROLEMIA: ICD-10-CM

## 2022-02-21 RX ORDER — DOXAZOSIN MESYLATE 4 MG/1
TABLET ORAL
Qty: 90 TABLET | Refills: 2 | Status: SHIPPED | OUTPATIENT
Start: 2022-02-21 | End: 2022-12-01 | Stop reason: SDUPTHER

## 2022-02-21 RX ORDER — LOSARTAN POTASSIUM 100 MG/1
TABLET ORAL
Qty: 90 TABLET | Refills: 2 | Status: SHIPPED | OUTPATIENT
Start: 2022-02-21 | End: 2022-11-07

## 2022-02-21 RX ORDER — METOPROLOL SUCCINATE 50 MG/1
TABLET, EXTENDED RELEASE ORAL
Qty: 90 TABLET | Refills: 2 | Status: SHIPPED | OUTPATIENT
Start: 2022-02-21 | End: 2022-11-07

## 2022-02-21 RX ORDER — ATORVASTATIN CALCIUM 20 MG/1
TABLET, FILM COATED ORAL
Qty: 90 TABLET | Refills: 2 | Status: SHIPPED | OUTPATIENT
Start: 2022-02-21 | End: 2022-11-07

## 2022-02-21 RX ORDER — LEVOTHYROXINE SODIUM 150 UG/1
TABLET ORAL
Qty: 90 TABLET | Refills: 2 | Status: SHIPPED | OUTPATIENT
Start: 2022-02-21 | End: 2022-12-01 | Stop reason: SDUPTHER

## 2022-02-22 DIAGNOSIS — E03.9 ACQUIRED HYPOTHYROIDISM: ICD-10-CM

## 2022-02-22 DIAGNOSIS — E21.3 HYPERPARATHYROIDISM: ICD-10-CM

## 2022-02-22 DIAGNOSIS — E78.00 PURE HYPERCHOLESTEROLEMIA: ICD-10-CM

## 2022-02-22 DIAGNOSIS — I10 ESSENTIAL HYPERTENSION: ICD-10-CM

## 2022-02-22 RX ORDER — LOSARTAN POTASSIUM 100 MG/1
TABLET ORAL
Qty: 30 TABLET | Refills: 0 | OUTPATIENT
Start: 2022-02-22

## 2022-09-28 ENCOUNTER — OFFICE VISIT (OUTPATIENT)
Dept: CARDIOLOGY | Facility: CLINIC | Age: 62
End: 2022-09-28

## 2022-09-28 VITALS
BODY MASS INDEX: 46.65 KG/M2 | DIASTOLIC BLOOD PRESSURE: 92 MMHG | WEIGHT: 315 LBS | HEIGHT: 69 IN | SYSTOLIC BLOOD PRESSURE: 128 MMHG | OXYGEN SATURATION: 96 % | HEART RATE: 85 BPM

## 2022-09-28 DIAGNOSIS — I10 PRIMARY HYPERTENSION: Primary | ICD-10-CM

## 2022-09-28 DIAGNOSIS — E11.9 DIABETES MELLITUS WITHOUT COMPLICATION: ICD-10-CM

## 2022-09-28 DIAGNOSIS — E78.2 MIXED HYPERLIPIDEMIA: ICD-10-CM

## 2022-09-28 PROCEDURE — 99213 OFFICE O/P EST LOW 20 MIN: CPT | Performed by: NURSE PRACTITIONER

## 2022-09-28 RX ORDER — LATANOPROST 50 UG/ML
SOLUTION/ DROPS OPHTHALMIC
COMMUNITY
Start: 2022-09-26

## 2022-09-28 NOTE — PROGRESS NOTES
[facility] Cardiology Office Follow Up Note    Miller Em  [id number]  09/28/2022    Primary Care Provider: Praful Rapp MD   Referring Provider: No ref. provider found    Chief Complaint: Regular follow-up    History of Present Illness:   Mrs. Miller Em is a 62 y.o. male who presents to the Cardiology Clinic for regular follow-up.  The patient has a past medical history of hypertension, hyperlipidemia, hypothyroidism, diabetes, morbid obesity, remote tobacco use, and restless leg syndrome.  He presents today for regular follow-up.  She reports feeling well from a cardiac standpoint and is getting regular exercise through riding a stationary bicycle.  He reports he does this without difficulty.  He denies chest pain but may experience some element of shortness of breath with significant exertion, which has not limited his activities of daily living or caused him any concern.  He continues to take his medication as prescribed.  He does report that he needs to make a follow-up appointment with his primary care provider for his blood sugar.  He used to check this frequently, but stopped doing this when he became frustrated with elevated glucose readings.  He denies palpitations, dizziness, syncope.  He denies orthopnea, PND, and lower extremity edema.  He offers no other complaints or concerns at this time.      Review of Systems:   Review of Systems   Constitutional: Negative for activity change, chills, diaphoresis, fatigue and fever.   Eyes: Negative for blurred vision and visual disturbance.   Respiratory: Negative for apnea, cough, chest tightness, shortness of breath and wheezing.    Cardiovascular: Negative for chest pain, palpitations and leg swelling.   Gastrointestinal: Negative for abdominal distention, blood in stool, GERD and indigestion.   Endocrine: Negative for cold intolerance and heat intolerance.   Genitourinary: Negative for hematuria.   Musculoskeletal:  Negative for gait problem, joint swelling and myalgias.   Skin: Negative for color change, pallor and wound.   Neurological: Negative for dizziness, seizures, syncope, weakness, light-headedness, numbness, headache and confusion.   Hematological: Does not bruise/bleed easily.   Psychiatric/Behavioral: Negative for behavioral problems, sleep disturbance, suicidal ideas and depressed mood.     I have reviewed and confirmed the accuracy of the ROS as documented by the MA/LPN/RN ODETTE Mauro    I have reviewed and/or updated the patient's past medical, past surgical, family, social history, problem list and allergies as appropriate.     Medications:     Current Outpatient Medications:   •  Accu-Chek FastClix Lancets [facility] [place], TEST DAILY FOR DM E11.9, Disp: 100 each, Rfl: 3  •  allopurinol (ZYLOPRIM) 100 MG tablet, Take 2 tablets by mouth Daily., Disp: 60 tablet, Rfl: 11  •  aspirin 81 MG EC tablet, Take 1 tablet by mouth Daily., Disp: 30 tablet, Rfl: 11  •  atorvastatin (LIPITOR) 20 MG tablet, TAKE 1 TABLET BY MOUTH ONCE DAILY AT NIGHT, Disp: 90 tablet, Rfl: 2  •  cholecalciferol (VITAMIN D3) 25 MCG (1000 UT) tablet, Take 1 tablet by mouth Daily., Disp: 90 tablet, Rfl: 3  •  doxazosin (CARDURA) 4 MG tablet, Take 1 tablet by mouth once daily, Disp: 90 tablet, Rfl: 2  •  Euthyrox 150 MCG tablet, Take 1 tablet by mouth once daily, Disp: 90 tablet, Rfl: 2  •  fluticasone (Flonase) 50 MCG/ACT nasal spray, 2 sprays into the nostril(s) as directed by provider Daily., Disp: 16 g, Rfl: 11  •  glucose blood test strip, Patient tests blood sugar one time daily.  Diagnosis code E11.9, Disp: 100 each, Rfl: 3  •  glucose monitor monitoring kit, 1 each As Needed (bs monitor)., Disp: 1 each, Rfl: 1  •  hydroCHLOROthiazide (HYDRODIURIL) 12.5 MG tablet, Take 1 tablet by mouth Daily., Disp: 90 tablet, Rfl: 3  •  latanoprost (XALATAN) 0.005 % ophthalmic solution, INSTILL 1 DROP INTO EACH EYE AT BEDTIME, Disp: , Rfl:   •  losartan  "(COZAAR) 100 MG tablet, Take 1 tablet by mouth once daily, Disp: 90 tablet, Rfl: 2  •  metFORMIN (GLUCOPHAGE) 500 MG tablet, Take 1 tablet by mouth once daily with breakfast, Disp: 90 tablet, Rfl: 2  •  metoprolol succinate XL (TOPROL-XL) 50 MG 24 hr tablet, Take 1 tablet by mouth once daily, Disp: 90 tablet, Rfl: 2    Physical Exam:  Vital Signs:   Vitals:    09/28/22 1451   BP: 128/92   BP Location: Left arm   Patient Position: Sitting   Cuff Size: Adult   Pulse: 85   SpO2: 96%   Weight: (!) 146 kg (322 lb)   Height: 175.3 cm (69\")     Body mass index is 47.55 kg/m².    Physical Exam  Vitals and nursing note reviewed.   Constitutional:       General: He is not in acute distress.     Appearance: He is morbidly obese.   HENT:      Head: Normocephalic and atraumatic.   Eyes:      General: No scleral icterus.     Extraocular Movements: Extraocular movements intact.   Neck:      Trachea: Trachea normal.   Cardiovascular:      Rate and Rhythm: Normal rate and regular rhythm.      Pulses: Normal pulses.      Heart sounds: Normal heart sounds. No murmur heard.    No friction rub. No gallop.   Pulmonary:      Effort: Pulmonary effort is normal.      Breath sounds: Normal breath sounds.   Musculoskeletal:         General: Normal range of motion.      Cervical back: Neck supple.      Right lower leg: Edema present.      Left lower leg: Edema present.      Comments: Trace edema to BLE   Skin:     General: Skin is warm and dry.      Findings: No bruising, lesion or rash.   Neurological:      Mental Status: He is alert and oriented to person, place, and time.      Motor: No weakness.      Gait: Gait normal.   Psychiatric:         Mood and Affect: Mood normal.         Behavior: Behavior normal. Behavior is cooperative.         Thought Content: Thought content does not include suicidal ideation.         Results Review:   I reviewed the patient's new clinical results.      Assessment / Plan:     1. Primary hypertension " (Primary)  --Acceptable blood pressure today  --Reinforced the importance of weight management, low-sodium diet, and avoidance of NSAIDs for long-term BP control  --No change to medication profile    2. Mixed hyperlipidemia  --11/21, total cholesterol 134, triglycerides 126, HDL 35, LDL 76  --Continue statin therapy    3. Diabetes mellitus without complication (HCC)  --7/21, HgA1c 7.1% (most recent lab available for review)      Preventative Cardiology:   Tobacco Cessation: N/A   Advance Care Planning: ACP discussion was declined by the patient. Patient does not have an advance directive, declines further assistance.     Follow Up:   Return in about 1 year (around 9/28/2023) for Follow-up with Dr. Styles.      Thank you for allowing me to participate in the care of your patient. Please to not hesitate to contact me with additional questions or concerns.     ODETTE Crook

## 2022-10-25 RX ORDER — HYDROCHLOROTHIAZIDE 12.5 MG/1
12.5 TABLET ORAL DAILY
Qty: 30 TABLET | Refills: 0 | Status: SHIPPED | OUTPATIENT
Start: 2022-10-25 | End: 2022-12-01 | Stop reason: SDUPTHER

## 2022-11-04 DIAGNOSIS — E11.59 TYPE 2 DIABETES MELLITUS WITH OTHER CIRCULATORY COMPLICATION, WITH LONG-TERM CURRENT USE OF INSULIN: ICD-10-CM

## 2022-11-04 DIAGNOSIS — E03.9 ACQUIRED HYPOTHYROIDISM: ICD-10-CM

## 2022-11-04 DIAGNOSIS — I10 ESSENTIAL HYPERTENSION: ICD-10-CM

## 2022-11-04 DIAGNOSIS — Z79.4 TYPE 2 DIABETES MELLITUS WITH OTHER CIRCULATORY COMPLICATION, WITH LONG-TERM CURRENT USE OF INSULIN: ICD-10-CM

## 2022-11-04 DIAGNOSIS — E21.3 HYPERPARATHYROIDISM: ICD-10-CM

## 2022-11-04 DIAGNOSIS — E78.00 PURE HYPERCHOLESTEROLEMIA: ICD-10-CM

## 2022-11-07 RX ORDER — LOSARTAN POTASSIUM 100 MG/1
TABLET ORAL
Qty: 90 TABLET | Refills: 0 | Status: SHIPPED | OUTPATIENT
Start: 2022-11-07 | End: 2023-03-16

## 2022-11-07 RX ORDER — ATORVASTATIN CALCIUM 20 MG/1
TABLET, FILM COATED ORAL
Qty: 90 TABLET | Refills: 0 | Status: SHIPPED | OUTPATIENT
Start: 2022-11-07 | End: 2023-03-03

## 2022-11-07 RX ORDER — METOPROLOL SUCCINATE 50 MG/1
TABLET, EXTENDED RELEASE ORAL
Qty: 90 TABLET | Refills: 0 | Status: SHIPPED | OUTPATIENT
Start: 2022-11-07

## 2022-11-07 NOTE — TELEPHONE ENCOUNTER
Rx Refill Note  Requested Prescriptions     Pending Prescriptions Disp Refills   • losartan (COZAAR) 100 MG tablet [Pharmacy Med Name: Losartan Potassium 100 MG Oral Tablet] 90 tablet 0     Sig: Take 1 tablet by mouth once daily   • metFORMIN (GLUCOPHAGE) 500 MG tablet [Pharmacy Med Name: metFORMIN HCl 500 MG Oral Tablet] 90 tablet 0     Sig: Take 1 tablet by mouth once daily with breakfast   • atorvastatin (LIPITOR) 20 MG tablet [Pharmacy Med Name: Atorvastatin Calcium 20 MG Oral Tablet] 90 tablet 0     Sig: TAKE 1 TABLET BY MOUTH ONCE DAILY AT NIGHT   • metoprolol succinate XL (TOPROL-XL) 50 MG 24 hr tablet [Pharmacy Med Name: Metoprolol Succinate ER 50 MG Oral Tablet Extended Release 24 Hour] 90 tablet 3     Sig: Take 1 tablet by mouth once daily      Last office visit with prescribing clinician: 11/16/2021      Next office visit with prescribing clinician: Visit date not found            Lorrie Schwarz MA  11/07/22, 17:16 EST     Attempted to contact patient; no answer and no option to leave voicemail.     .Prescription adjusted to reflect 90 days with 0 refills until scheduled appointment and years supply will be given

## 2022-12-01 DIAGNOSIS — E11.59 TYPE 2 DIABETES MELLITUS WITH OTHER CIRCULATORY COMPLICATION, WITH LONG-TERM CURRENT USE OF INSULIN: ICD-10-CM

## 2022-12-01 DIAGNOSIS — Z79.4 TYPE 2 DIABETES MELLITUS WITH OTHER CIRCULATORY COMPLICATION, WITH LONG-TERM CURRENT USE OF INSULIN: ICD-10-CM

## 2022-12-01 DIAGNOSIS — M10.9 GOUT, UNSPECIFIED CAUSE, UNSPECIFIED CHRONICITY, UNSPECIFIED SITE: ICD-10-CM

## 2022-12-01 DIAGNOSIS — E03.9 ACQUIRED HYPOTHYROIDISM: ICD-10-CM

## 2022-12-02 RX ORDER — DOXAZOSIN MESYLATE 4 MG/1
4 TABLET ORAL DAILY
Qty: 30 TABLET | Refills: 0 | Status: SHIPPED | OUTPATIENT
Start: 2022-12-02 | End: 2023-01-02 | Stop reason: SDUPTHER

## 2022-12-02 RX ORDER — ALLOPURINOL 100 MG/1
200 TABLET ORAL DAILY
Qty: 60 TABLET | Refills: 0 | Status: SHIPPED | OUTPATIENT
Start: 2022-12-02 | End: 2022-12-21

## 2022-12-02 RX ORDER — HYDROCHLOROTHIAZIDE 12.5 MG/1
12.5 TABLET ORAL DAILY
Qty: 30 TABLET | Refills: 0 | Status: SHIPPED | OUTPATIENT
Start: 2022-12-02 | End: 2023-01-02 | Stop reason: SDUPTHER

## 2022-12-02 RX ORDER — LEVOTHYROXINE SODIUM 0.15 MG/1
150 TABLET ORAL DAILY
Qty: 90 TABLET | Refills: 2 | Status: SHIPPED | OUTPATIENT
Start: 2022-12-02

## 2022-12-16 DIAGNOSIS — Z12.5 PROSTATE CANCER SCREENING: ICD-10-CM

## 2022-12-16 DIAGNOSIS — M10.9 GOUT, UNSPECIFIED CAUSE, UNSPECIFIED CHRONICITY, UNSPECIFIED SITE: ICD-10-CM

## 2022-12-16 DIAGNOSIS — Z79.4 TYPE 2 DIABETES MELLITUS WITH OTHER CIRCULATORY COMPLICATION, WITH LONG-TERM CURRENT USE OF INSULIN: Primary | ICD-10-CM

## 2022-12-16 DIAGNOSIS — E11.59 TYPE 2 DIABETES MELLITUS WITH OTHER CIRCULATORY COMPLICATION, WITH LONG-TERM CURRENT USE OF INSULIN: Primary | ICD-10-CM

## 2022-12-17 LAB
ALBUMIN SERPL-MCNC: 4.6 G/DL (ref 3.5–5.2)
ALBUMIN/GLOB SERPL: 1.9 G/DL
ALP SERPL-CCNC: 78 U/L (ref 39–117)
ALT SERPL-CCNC: 32 U/L (ref 1–41)
AST SERPL-CCNC: 24 U/L (ref 1–40)
BASOPHILS # BLD AUTO: 0.05 10*3/MM3 (ref 0–0.2)
BASOPHILS NFR BLD AUTO: 0.9 % (ref 0–1.5)
BILIRUB SERPL-MCNC: 0.3 MG/DL (ref 0–1.2)
BUN SERPL-MCNC: 14 MG/DL (ref 8–23)
BUN/CREAT SERPL: 10.5 (ref 7–25)
CALCIUM SERPL-MCNC: 11.6 MG/DL (ref 8.6–10.5)
CHLORIDE SERPL-SCNC: 103 MMOL/L (ref 98–107)
CHOLEST SERPL-MCNC: 132 MG/DL (ref 0–200)
CO2 SERPL-SCNC: 26.9 MMOL/L (ref 22–29)
CREAT SERPL-MCNC: 1.33 MG/DL (ref 0.76–1.27)
EGFRCR SERPLBLD CKD-EPI 2021: 59.7 ML/MIN/1.73
EOSINOPHIL # BLD AUTO: 0.11 10*3/MM3 (ref 0–0.4)
EOSINOPHIL NFR BLD AUTO: 1.9 % (ref 0.3–6.2)
ERYTHROCYTE [DISTWIDTH] IN BLOOD BY AUTOMATED COUNT: 15.6 % (ref 12.3–15.4)
GLOBULIN SER CALC-MCNC: 2.4 GM/DL
GLUCOSE SERPL-MCNC: 135 MG/DL (ref 65–99)
HBA1C MFR BLD: 8.9 % (ref 4.8–5.6)
HCT VFR BLD AUTO: 47.6 % (ref 37.5–51)
HDLC SERPL-MCNC: 28 MG/DL (ref 40–60)
HGB BLD-MCNC: 14.6 G/DL (ref 13–17.7)
IMM GRANULOCYTES # BLD AUTO: 0.02 10*3/MM3 (ref 0–0.05)
IMM GRANULOCYTES NFR BLD AUTO: 0.3 % (ref 0–0.5)
LDLC SERPL CALC-MCNC: 64 MG/DL (ref 0–100)
LYMPHOCYTES # BLD AUTO: 2.08 10*3/MM3 (ref 0.7–3.1)
LYMPHOCYTES NFR BLD AUTO: 36.4 % (ref 19.6–45.3)
MCH RBC QN AUTO: 26.4 PG (ref 26.6–33)
MCHC RBC AUTO-ENTMCNC: 30.7 G/DL (ref 31.5–35.7)
MCV RBC AUTO: 86.1 FL (ref 79–97)
MICROALBUMIN UR-MCNC: 9.8 UG/ML
MONOCYTES # BLD AUTO: 0.62 10*3/MM3 (ref 0.1–0.9)
MONOCYTES NFR BLD AUTO: 10.8 % (ref 5–12)
NEUTROPHILS # BLD AUTO: 2.84 10*3/MM3 (ref 1.7–7)
NEUTROPHILS NFR BLD AUTO: 49.7 % (ref 42.7–76)
NRBC BLD AUTO-RTO: 0 /100 WBC (ref 0–0.2)
PLATELET # BLD AUTO: 215 10*3/MM3 (ref 140–450)
POTASSIUM SERPL-SCNC: 4.3 MMOL/L (ref 3.5–5.2)
PROT SERPL-MCNC: 7 G/DL (ref 6–8.5)
PSA SERPL-MCNC: 2.24 NG/ML (ref 0–4)
RBC # BLD AUTO: 5.53 10*6/MM3 (ref 4.14–5.8)
SODIUM SERPL-SCNC: 139 MMOL/L (ref 136–145)
T4 FREE SERPL-MCNC: 1.6 NG/DL (ref 0.93–1.7)
TRIGL SERPL-MCNC: 247 MG/DL (ref 0–150)
TSH SERPL DL<=0.005 MIU/L-ACNC: 1.15 UIU/ML (ref 0.27–4.2)
URATE SERPL-MCNC: 6.1 MG/DL (ref 3.4–7)
VIT B12 SERPL-MCNC: 518 PG/ML (ref 211–946)
VLDLC SERPL CALC-MCNC: 40 MG/DL (ref 5–40)
WBC # BLD AUTO: 5.72 10*3/MM3 (ref 3.4–10.8)

## 2022-12-21 ENCOUNTER — OFFICE VISIT (OUTPATIENT)
Dept: INTERNAL MEDICINE | Facility: CLINIC | Age: 62
End: 2022-12-21

## 2022-12-21 VITALS
TEMPERATURE: 97.8 F | OXYGEN SATURATION: 99 % | SYSTOLIC BLOOD PRESSURE: 134 MMHG | HEIGHT: 69 IN | HEART RATE: 87 BPM | RESPIRATION RATE: 16 BRPM | WEIGHT: 315 LBS | BODY MASS INDEX: 46.65 KG/M2 | DIASTOLIC BLOOD PRESSURE: 92 MMHG

## 2022-12-21 DIAGNOSIS — I10 BENIGN ESSENTIAL HYPERTENSION: ICD-10-CM

## 2022-12-21 DIAGNOSIS — E79.0 HYPERURICEMIA: ICD-10-CM

## 2022-12-21 DIAGNOSIS — S76.011A PSOAS MUSCLE STRAIN, RIGHT, INITIAL ENCOUNTER: Primary | ICD-10-CM

## 2022-12-21 DIAGNOSIS — E83.52 HYPERCALCEMIA: ICD-10-CM

## 2022-12-21 DIAGNOSIS — M19.90 ARTHRITIS: ICD-10-CM

## 2022-12-21 PROCEDURE — 99214 OFFICE O/P EST MOD 30 MIN: CPT | Performed by: INTERNAL MEDICINE

## 2022-12-21 RX ORDER — ALLOPURINOL 300 MG/1
300 TABLET ORAL DAILY
Qty: 90 TABLET | Refills: 3 | Status: SHIPPED | OUTPATIENT
Start: 2022-12-21

## 2022-12-21 NOTE — PROGRESS NOTES
Subjective     Patient ID: Miller Em is a 62 y.o. male. Patient is here for management of multiple medical problems.     Chief Complaint   Patient presents with   • Annual Exam   not annual exam   History of Present Illness     Not annual exam.     Sinus congestion then thraot closing up.  Can last a week.     Started back on cpap last moth.      Pain in groin.  Pain with foot put down.   Pain  In right hand.       The following portions of the patient's history were reviewed and updated as appropriate: allergies, current medications, past family history, past medical history, past social history, past surgical history and problem list.    Review of Systems    Current Outpatient Medications:   •  Accu-Chek FastClix Lancets [facility] [place], TEST DAILY FOR DM E11.9, Disp: 100 each, Rfl: 3  •  aspirin 81 MG EC tablet, Take 1 tablet by mouth Daily., Disp: 30 tablet, Rfl: 11  •  atorvastatin (LIPITOR) 20 MG tablet, TAKE 1 TABLET BY MOUTH ONCE DAILY AT NIGHT, Disp: 90 tablet, Rfl: 0  •  cholecalciferol (VITAMIN D3) 25 MCG (1000 UT) tablet, Take 1 tablet by mouth Daily., Disp: 90 tablet, Rfl: 3  •  doxazosin (CARDURA) 4 MG tablet, Take 1 tablet by mouth Daily., Disp: 30 tablet, Rfl: 0  •  fluticasone (Flonase) 50 MCG/ACT nasal spray, 2 sprays into the nostril(s) as directed by provider Daily., Disp: 16 g, Rfl: 11  •  glucose blood test strip, Patient tests blood sugar one time daily.  Diagnosis code E11.9, Disp: 100 each, Rfl: 3  •  glucose monitor monitoring kit, 1 each As Needed (bs monitor)., Disp: 1 each, Rfl: 1  •  hydroCHLOROthiazide (HYDRODIURIL) 12.5 MG tablet, Take 1 tablet by mouth Daily. PT NEEDS TO SCHEDULE APPOINTMENT FOR FURTHER REFILLS, Disp: 30 tablet, Rfl: 0  •  latanoprost (XALATAN) 0.005 % ophthalmic solution, INSTILL 1 DROP INTO EACH EYE AT BEDTIME, Disp: , Rfl:   •  levothyroxine (Euthyrox) 150 MCG tablet, Take 1 tablet by mouth Daily., Disp: 90 tablet, Rfl: 2  •  losartan (COZAAR) 100 MG tablet, Take 1  "tablet by mouth once daily, Disp: 90 tablet, Rfl: 0  •  metFORMIN (GLUCOPHAGE) 500 MG tablet, Take 1 tablet by mouth once daily with breakfast, Disp: 90 tablet, Rfl: 0  •  metoprolol succinate XL (TOPROL-XL) 50 MG 24 hr tablet, Take 1 tablet by mouth once daily, Disp: 90 tablet, Rfl: 0  •  allopurinol (Zyloprim) 300 MG tablet, Take 1 tablet by mouth Daily., Disp: 90 tablet, Rfl: 3    Objective      Blood pressure 134/92, pulse 87, temperature 97.8 °F (36.6 °C), resp. rate 16, height 175.3 cm (69\"), weight (!) 147 kg (323 lb), SpO2 99 %.    Physical Exam     General Appearance:    Alert, cooperative, no distress, appears stated age   Head:    Normocephalic, without obvious abnormality, atraumatic   Eyes:    PERRL, conjunctiva/corneas clear, EOM's intact   Ears:    Normal TM's and external ear canals, both ears   Nose:   Nares normal, septum midline, mucosa normal, no drainage   or sinus tenderness   Throat:   Lips, mucosa, and tongue normal; teeth and gums normal   Neck:   Supple, symmetrical, trachea midline, no adenopathy;        thyroid:  No enlargement/tenderness/nodules; no carotid    bruit or JVD   Back:     Symmetric, no curvature, ROM normal, no CVA tenderness   Lungs:     Clear to auscultation bilaterally, respirations unlabored   Chest wall:    No tenderness or deformity   Heart:    Regular rate and rhythm, S1 and S2 normal, no murmur,        rub or gallop   Abdomen:     Soft, non-tender, bowel sounds active all four quadrants,     no masses, no organomegaly   Extremities:   Extremities normal, atraumatic, no cyanosis or edema   Pulses:   2+ and symmetric all extremities   Skin:   Skin color, texture, turgor normal, no rashes or lesions   Lymph nodes:   Cervical, supraclavicular, and axillary nodes normal   Neurologic:   CNII-XII intact. Normal strength, sensation and reflexes       throughout      Results for orders placed or performed in visit on 12/16/22   Uric acid    Specimen: Blood   Result Value Ref " Range    Uric Acid 6.1 3.4 - 7.0 mg/dL   PSA Screen    Specimen: Blood   Result Value Ref Range    PSA 2.240 0.000 - 4.000 ng/mL   Lipid Panel    Specimen: Blood   Result Value Ref Range    Total Cholesterol 132 0 - 200 mg/dL    Triglycerides 247 (H) 0 - 150 mg/dL    HDL Cholesterol 28 (L) 40 - 60 mg/dL    VLDL Cholesterol Paulino 40 5 - 40 mg/dL    LDL Chol Calc (NIH) 64 0 - 100 mg/dL   Vitamin B12    Specimen: Blood   Result Value Ref Range    Vitamin B-12 518 211 - 946 pg/mL   Comprehensive Metabolic Panel    Specimen: Blood   Result Value Ref Range    Glucose 135 (H) 65 - 99 mg/dL    BUN 14 8 - 23 mg/dL    Creatinine 1.33 (H) 0.76 - 1.27 mg/dL    EGFR Result 59.7 (L) >60.0 mL/min/1.73    BUN/Creatinine Ratio 10.5 7.0 - 25.0    Sodium 139 136 - 145 mmol/L    Potassium 4.3 3.5 - 5.2 mmol/L    Chloride 103 98 - 107 mmol/L    Total CO2 26.9 22.0 - 29.0 mmol/L    Calcium 11.6 (H) 8.6 - 10.5 mg/dL    Total Protein 7.0 6.0 - 8.5 g/dL    Albumin 4.60 3.50 - 5.20 g/dL    Globulin 2.4 gm/dL    A/G Ratio 1.9 g/dL    Total Bilirubin 0.3 0.0 - 1.2 mg/dL    Alkaline Phosphatase 78 39 - 117 U/L    AST (SGOT) 24 1 - 40 U/L    ALT (SGPT) 32 1 - 41 U/L   TSH    Specimen: Blood   Result Value Ref Range    TSH 1.150 0.270 - 4.200 uIU/mL   T4, Free    Specimen: Blood   Result Value Ref Range    Free T4 1.60 0.93 - 1.70 ng/dL   Hemoglobin A1c    Specimen: Blood   Result Value Ref Range    Hemoglobin A1C 8.90 (H) 4.80 - 5.60 %   MicroAlbumin, Urine, Random - Urine, Clean Catch    Specimen: Urine, Clean Catch   Result Value Ref Range    Microalbumin, Urine 9.8 Not Estab. ug/mL   CBC & Differential    Specimen: Blood   Result Value Ref Range    WBC 5.72 3.40 - 10.80 10*3/mm3    RBC 5.53 4.14 - 5.80 10*6/mm3    Hemoglobin 14.6 13.0 - 17.7 g/dL    Hematocrit 47.6 37.5 - 51.0 %    MCV 86.1 79.0 - 97.0 fL    MCH 26.4 (L) 26.6 - 33.0 pg    MCHC 30.7 (L) 31.5 - 35.7 g/dL    RDW 15.6 (H) 12.3 - 15.4 %    Platelets 215 140 - 450 10*3/mm3     Neutrophil Rel % 49.7 42.7 - 76.0 %    Lymphocyte Rel % 36.4 19.6 - 45.3 %    Monocyte Rel % 10.8 5.0 - 12.0 %    Eosinophil Rel % 1.9 0.3 - 6.2 %    Basophil Rel % 0.9 0.0 - 1.5 %    Neutrophils Absolute 2.84 1.70 - 7.00 10*3/mm3    Lymphocytes Absolute 2.08 0.70 - 3.10 10*3/mm3    Monocytes Absolute 0.62 0.10 - 0.90 10*3/mm3    Eosinophils Absolute 0.11 0.00 - 0.40 10*3/mm3    Basophils Absolute 0.05 0.00 - 0.20 10*3/mm3    Immature Granulocyte Rel % 0.3 0.0 - 0.5 %    Immature Grans Absolute 0.02 0.00 - 0.05 10*3/mm3    nRBC 0.0 0.0 - 0.2 /100 WBC         Assessment & Plan   Cri worse  DM worse. insuranc not covering [place].       Uric acid no sig worsening still not good.        Diagnoses and all orders for this visit:    1. Psoas muscle strain, right, initial encounter (Primary)    2. Benign essential hypertension    3. Arthritis    4. Hypercalcemia  -     Calcium, Ionized  -     Vitamin D 25 hydroxy  -     PTH, Intact  -     US Thyroid    5. Hyperuricemia    Other orders  -     allopurinol (Zyloprim) 300 MG tablet; Take 1 tablet by mouth Daily.  Dispense: 90 tablet; Refill: 3      Return in about 4 weeks (around 1/18/2023).          There are no Patient Instructions on file for this visit.     Praful Rapp MD    Assessment & Plan

## 2023-01-02 DIAGNOSIS — Z79.4 TYPE 2 DIABETES MELLITUS WITH OTHER CIRCULATORY COMPLICATION, WITH LONG-TERM CURRENT USE OF INSULIN: ICD-10-CM

## 2023-01-02 DIAGNOSIS — E11.59 TYPE 2 DIABETES MELLITUS WITH OTHER CIRCULATORY COMPLICATION, WITH LONG-TERM CURRENT USE OF INSULIN: ICD-10-CM

## 2023-01-03 RX ORDER — HYDROCHLOROTHIAZIDE 12.5 MG/1
12.5 TABLET ORAL DAILY
Qty: 30 TABLET | Refills: 0 | Status: SHIPPED | OUTPATIENT
Start: 2023-01-03 | End: 2023-03-03

## 2023-01-03 RX ORDER — DOXAZOSIN MESYLATE 4 MG/1
4 TABLET ORAL DAILY
Qty: 30 TABLET | Refills: 11 | Status: SHIPPED | OUTPATIENT
Start: 2023-01-03 | End: 2023-04-06 | Stop reason: HOSPADM

## 2023-02-03 ENCOUNTER — TELEPHONE (OUTPATIENT)
Dept: INTERNAL MEDICINE | Facility: CLINIC | Age: 63
End: 2023-02-03
Payer: COMMERCIAL

## 2023-02-03 NOTE — TELEPHONE ENCOUNTER
PATIENT HAS AN APPOINTMENT ON 2-9-23 AND WOULD LIKE TO SEE IF HE NEEDS TO DO BLOOD WORK BEFORE THAT APPOINTMENT    PATIENT CALL BACK  [phone]

## 2023-02-07 ENCOUNTER — HOSPITAL ENCOUNTER (OUTPATIENT)
Dept: ULTRASOUND IMAGING | Facility: HOSPITAL | Age: 63
Discharge: HOME | End: 2023-02-07
Admitting: INTERNAL MEDICINE
Payer: COMMERCIAL

## 2023-02-07 LAB
25(OH)D3+25(OH)D2 SERPL-MCNC: 17 NG/ML (ref 30–100)
CA-I SERPL ISE-MCNC: 6.6 MG/DL (ref 4.5–5.6)
PTH-INTACT SERPL-MCNC: 81 PG/ML (ref 15–65)

## 2023-02-07 PROCEDURE — 76536 US EXAM OF HEAD AND NECK: CPT

## 2023-02-09 ENCOUNTER — OFFICE VISIT (OUTPATIENT)
Dept: INTERNAL MEDICINE | Facility: CLINIC | Age: 63
End: 2023-02-09
Payer: COMMERCIAL

## 2023-02-09 VITALS
TEMPERATURE: 98.4 F | HEIGHT: 69 IN | WEIGHT: 315 LBS | OXYGEN SATURATION: 97 % | BODY MASS INDEX: 46.65 KG/M2 | HEART RATE: 98 BPM | SYSTOLIC BLOOD PRESSURE: 122 MMHG | DIASTOLIC BLOOD PRESSURE: 88 MMHG

## 2023-02-09 DIAGNOSIS — R79.89 LOW VITAMIN D LEVEL: ICD-10-CM

## 2023-02-09 DIAGNOSIS — I10 BENIGN ESSENTIAL HYPERTENSION: Primary | ICD-10-CM

## 2023-02-09 DIAGNOSIS — E21.3 HYPERPARATHYROIDISM: ICD-10-CM

## 2023-02-09 PROCEDURE — 99214 OFFICE O/P EST MOD 30 MIN: CPT | Performed by: INTERNAL MEDICINE

## 2023-02-09 RX ORDER — CHOLECALCIFEROL (VITAMIN D3) 1250 MCG
50000 CAPSULE ORAL
Qty: 12 CAPSULE | Refills: 4 | Status: SHIPPED | OUTPATIENT
Start: 2023-02-09

## 2023-02-09 NOTE — PROGRESS NOTES
Subjective     Patient ID: Miller Em is a 62 y.o. male. Patient is here for management of multiple medical problems.     Chief Complaint   Patient presents with   • Follow-up     History of Present Illness     The following portions of the patient's history were reviewed and updated as appropriate: allergies, current medications, past family history, past medical history, past social history, past surgical history and problem list.    Review of Systems    Current Outpatient Medications:   •  Accu-Chek FastClix Lancets [facility] [place], TEST DAILY FOR DM E11.9, Disp: 100 each, Rfl: 3  •  allopurinol (Zyloprim) 300 MG tablet, Take 1 tablet by mouth Daily., Disp: 90 tablet, Rfl: 3  •  aspirin 81 MG EC tablet, Take 1 tablet by mouth Daily., Disp: 30 tablet, Rfl: 11  •  atorvastatin (LIPITOR) 20 MG tablet, TAKE 1 TABLET BY MOUTH ONCE DAILY AT NIGHT, Disp: 90 tablet, Rfl: 0  •  doxazosin (CARDURA) 4 MG tablet, Take 1 tablet by mouth Daily., Disp: 30 tablet, Rfl: 11  •  fluticasone (Flonase) 50 MCG/ACT nasal spray, 2 sprays into the nostril(s) as directed by provider Daily., Disp: 16 g, Rfl: 11  •  glucose blood test strip, Patient tests blood sugar one time daily.  Diagnosis code E11.9, Disp: 100 each, Rfl: 3  •  glucose monitor monitoring kit, 1 each As Needed (bs monitor)., Disp: 1 each, Rfl: 1  •  hydroCHLOROthiazide (HYDRODIURIL) 12.5 MG tablet, Take 1 tablet by mouth Daily. PT NEEDS TO SCHEDULE APPOINTMENT FOR FURTHER REFILLS, Disp: 30 tablet, Rfl: 0  •  latanoprost (XALATAN) 0.005 % ophthalmic solution, INSTILL 1 DROP INTO EACH EYE AT BEDTIME, Disp: , Rfl:   •  levothyroxine (Euthyrox) 150 MCG tablet, Take 1 tablet by mouth Daily., Disp: 90 tablet, Rfl: 2  •  losartan (COZAAR) 100 MG tablet, Take 1 tablet by mouth once daily, Disp: 90 tablet, Rfl: 0  •  metFORMIN (GLUCOPHAGE) 500 MG tablet, Take 1 tablet by mouth once daily with breakfast, Disp: 90 tablet, Rfl: 0  •  metoprolol succinate XL (TOPROL-XL) 50 MG 24 hr tablet,  "Take 1 tablet by mouth once daily, Disp: 90 tablet, Rfl: 0  •  Cholecalciferol (Vitamin D3) 1.25 MG (37420 UT) capsule, Take 1 capsule by mouth Every 7 (Seven) Days., Disp: 12 capsule, Rfl: 4    Objective      Blood pressure 122/88, pulse 98, temperature 98.4 °F (36.9 °C), temperature source Oral, height 175.3 cm (69.02\"), weight (!) 146 kg (321 lb 12.8 oz), SpO2 97 %.    Physical Exam     General Appearance:    Alert, cooperative, no distress, appears stated age   Head:    Normocephalic, without obvious abnormality, atraumatic   Eyes:    PERRL, conjunctiva/corneas clear, EOM's intact   Ears:    Normal TM's and external ear canals, both ears   Nose:   Nares normal, septum midline, mucosa normal, no drainage   or sinus tenderness   Throat:   Lips, mucosa, and tongue normal; teeth and gums normal   Neck:   Supple, symmetrical, trachea midline, no adenopathy;        thyroid:  No enlargement/tenderness/nodules; no carotid    bruit or JVD   Back:     Symmetric, no curvature, ROM normal, no CVA tenderness   Lungs:     Clear to auscultation bilaterally, respirations unlabored   Chest wall:    No tenderness or deformity   Heart:    Regular rate and rhythm, S1 and S2 normal, no murmur,        rub or gallop   Abdomen:     Soft, non-tender, bowel sounds active all four quadrants,     no masses, no organomegaly   Extremities:   Extremities normal, atraumatic, no cyanosis or edema   Pulses:   2+ and symmetric all extremities   Skin:   Skin color, texture, turgor normal, no rashes or lesions   Lymph nodes:   Cervical, supraclavicular, and axillary nodes normal   Neurologic:   CNII-XII intact. Normal strength, sensation and reflexes       throughout      Results for orders placed or performed in visit on 12/21/22   Calcium, Ionized    Specimen: Blood   Result Value Ref Range    Ionized Calcium 6.6 (H) 4.5 - 5.6 mg/dL   Vitamin D 25 hydroxy    Specimen: Blood   Result Value Ref Range    25 Hydroxy, Vitamin D 17.0 (L) 30.0 - 100.0 " ng/ml   PTH, Intact    Specimen: Blood   Result Value Ref Range    PTH, Intact 81 (H) 15 - 65 pg/mL         Assessment & Plan   Pt thinks he may he is taking vit d taking 4-6 pills a day of the small ones/    Will start vit       Diagnoses and all orders for this visit:    1. Benign essential hypertension (Primary)  -     Cholecalciferol (Vitamin D3) 1.25 MG (80447 UT) capsule; Take 1 capsule by mouth Every 7 (Seven) Days.  Dispense: 12 capsule; Refill: 4  -     Vitamin D 25 hydroxy  -     PTH, Intact  -     Calcium, Ionized    2. Low vitamin D level  -     Cholecalciferol (Vitamin D3) 1.25 MG (86159 UT) capsule; Take 1 capsule by mouth Every 7 (Seven) Days.  Dispense: 12 capsule; Refill: 4  -     Vitamin D 25 hydroxy  -     PTH, Intact  -     Calcium, Ionized    3. Hyperparathyroidism (HCC)  -     Ambulatory Referral to Endocrinology  -     Vitamin D 25 hydroxy  -     PTH, Intact  -     Calcium, Ionized      Return in about 6 weeks (around 3/23/2023).          There are no Patient Instructions on file for this visit.     Praful Rapp MD    Assessment & Plan

## 2023-03-03 DIAGNOSIS — I10 ESSENTIAL HYPERTENSION: ICD-10-CM

## 2023-03-03 RX ORDER — ATORVASTATIN CALCIUM 20 MG/1
TABLET, FILM COATED ORAL
Qty: 90 TABLET | Refills: 3 | Status: SHIPPED | OUTPATIENT
Start: 2023-03-03

## 2023-03-03 RX ORDER — HYDROCHLOROTHIAZIDE 12.5 MG/1
TABLET ORAL
Qty: 30 TABLET | Refills: 0 | Status: SHIPPED | OUTPATIENT
Start: 2023-03-03 | End: 2023-04-06 | Stop reason: HOSPADM

## 2023-03-16 DIAGNOSIS — I10 ESSENTIAL HYPERTENSION: ICD-10-CM

## 2023-03-16 DIAGNOSIS — E78.00 PURE HYPERCHOLESTEROLEMIA: ICD-10-CM

## 2023-03-16 DIAGNOSIS — E21.3 HYPERPARATHYROIDISM: ICD-10-CM

## 2023-03-16 DIAGNOSIS — E03.9 ACQUIRED HYPOTHYROIDISM: ICD-10-CM

## 2023-03-16 RX ORDER — LOSARTAN POTASSIUM 100 MG/1
TABLET ORAL
Qty: 90 TABLET | Refills: 0 | Status: SHIPPED | OUTPATIENT
Start: 2023-03-16

## 2023-04-04 ENCOUNTER — HOSPITAL ENCOUNTER (INPATIENT)
Facility: HOSPITAL | Age: 63
LOS: 2 days | Discharge: HOME | DRG: 638 | End: 2023-04-06
Attending: EMERGENCY MEDICINE | Admitting: FAMILY MEDICINE
Payer: MEDICARE

## 2023-04-04 ENCOUNTER — APPOINTMENT (OUTPATIENT)
Dept: CT IMAGING | Facility: HOSPITAL | Age: 63
DRG: 638 | End: 2023-04-04
Payer: MEDICARE

## 2023-04-04 ENCOUNTER — OFFICE VISIT (OUTPATIENT)
Dept: FAMILY MEDICINE CLINIC | Facility: CLINIC | Age: 63
End: 2023-04-04
Payer: MEDICARE

## 2023-04-04 ENCOUNTER — APPOINTMENT (OUTPATIENT)
Dept: GENERAL RADIOLOGY | Facility: HOSPITAL | Age: 63
DRG: 638 | End: 2023-04-04
Payer: MEDICARE

## 2023-04-04 VITALS
OXYGEN SATURATION: 97 % | SYSTOLIC BLOOD PRESSURE: 62 MMHG | HEIGHT: 69 IN | WEIGHT: 282 LBS | BODY MASS INDEX: 41.77 KG/M2 | DIASTOLIC BLOOD PRESSURE: 48 MMHG | HEART RATE: 100 BPM

## 2023-04-04 DIAGNOSIS — E11.59 TYPE 2 DIABETES MELLITUS WITH OTHER CIRCULATORY COMPLICATION, WITHOUT LONG-TERM CURRENT USE OF INSULIN: Primary | ICD-10-CM

## 2023-04-04 DIAGNOSIS — R63.1 POLYDIPSIA: ICD-10-CM

## 2023-04-04 DIAGNOSIS — I95.9 HYPOTENSION, UNSPECIFIED HYPOTENSION TYPE: ICD-10-CM

## 2023-04-04 DIAGNOSIS — E08.10 DIABETIC KETOACIDOSIS WITHOUT COMA ASSOCIATED WITH DIABETES MELLITUS DUE TO UNDERLYING CONDITION: Primary | ICD-10-CM

## 2023-04-04 DIAGNOSIS — H66.92 LEFT ACUTE OTITIS MEDIA: ICD-10-CM

## 2023-04-04 DIAGNOSIS — N17.9 AKI (ACUTE KIDNEY INJURY): ICD-10-CM

## 2023-04-04 DIAGNOSIS — R73.9 ELEVATED BLOOD SUGAR: ICD-10-CM

## 2023-04-04 DIAGNOSIS — R35.0 FREQUENCY OF URINATION AND POLYURIA: ICD-10-CM

## 2023-04-04 DIAGNOSIS — R35.89 FREQUENCY OF URINATION AND POLYURIA: ICD-10-CM

## 2023-04-04 DIAGNOSIS — R53.1 WEAKNESS: ICD-10-CM

## 2023-04-04 LAB
A-A DO2: 25.1 MMHG
ALBUMIN SERPL-MCNC: 3.5 G/DL (ref 3.5–5.2)
ALBUMIN/GLOB SERPL: 1.3 G/DL
ALP SERPL-CCNC: 83 U/L (ref 39–117)
ALT SERPL W P-5'-P-CCNC: 27 U/L (ref 1–41)
ANION GAP SERPL CALCULATED.3IONS-SCNC: 20.1 MMOL/L (ref 5–15)
ARTERIAL PATENCY WRIST A: ABNORMAL
AST SERPL-CCNC: 19 U/L (ref 1–40)
ATMOSPHERIC PRESS: 730 MMHG
BASE EXCESS BLDA CALC-SCNC: -9.6 MMOL/L (ref 0–2)
BASOPHILS # BLD AUTO: 0.05 10*3/MM3 (ref 0–0.2)
BASOPHILS NFR BLD AUTO: 0.9 % (ref 0–1.5)
BDY SITE: ABNORMAL
BILIRUB SERPL-MCNC: 0.4 MG/DL (ref 0–1.2)
BUN SERPL-MCNC: 58 MG/DL (ref 8–23)
BUN/CREAT SERPL: 21.9 (ref 7–25)
CALCIUM SPEC-SCNC: 10.9 MG/DL (ref 8.6–10.5)
CHLORIDE SERPL-SCNC: 90 MMOL/L (ref 98–107)
CO2 SERPL-SCNC: 14.9 MMOL/L (ref 22–29)
COHGB MFR BLD: 1 % (ref 0–2)
CREAT SERPL-MCNC: 2.65 MG/DL (ref 0.76–1.27)
D-LACTATE SERPL-SCNC: 2.2 MMOL/L (ref 0.5–2)
DEPRECATED RDW RBC AUTO: 49.1 FL (ref 37–54)
EGFRCR SERPLBLD CKD-EPI 2021: 26.1 ML/MIN/1.73
EOSINOPHIL # BLD AUTO: 0.05 10*3/MM3 (ref 0–0.4)
EOSINOPHIL NFR BLD AUTO: 0.9 % (ref 0.3–6.2)
ERYTHROCYTE [DISTWIDTH] IN BLOOD BY AUTOMATED COUNT: 15.8 % (ref 12.3–15.4)
GLOBULIN UR ELPH-MCNC: 2.7 GM/DL
GLUCOSE BLDC GLUCOMTR-MCNC: 535 MG/DL (ref 70–130)
GLUCOSE BLDC GLUCOMTR-MCNC: ABNORMAL MG/DL
GLUCOSE SERPL-MCNC: 728 MG/DL (ref 65–99)
HBA1C MFR BLD: 17.3 % (ref 4.8–5.6)
HCO3 BLDA-SCNC: 15.8 MMOL/L (ref 22–28)
HCT VFR BLD AUTO: 43.3 % (ref 37.5–51)
HCT VFR BLD CALC: 42.5 %
HGB BLD-MCNC: 13.7 G/DL (ref 13–17.7)
IMM GRANULOCYTES # BLD AUTO: 0.03 10*3/MM3 (ref 0–0.05)
IMM GRANULOCYTES NFR BLD AUTO: 0.5 % (ref 0–0.5)
LIPASE SERPL-CCNC: 450 U/L (ref 13–60)
LYMPHOCYTES # BLD AUTO: 1.17 10*3/MM3 (ref 0.7–3.1)
LYMPHOCYTES NFR BLD AUTO: 20.7 % (ref 19.6–45.3)
Lab: ABNORMAL
MAGNESIUM SERPL-MCNC: 2.5 MG/DL (ref 1.6–2.4)
MCH RBC QN AUTO: 27.1 PG (ref 26.6–33)
MCHC RBC AUTO-ENTMCNC: 31.6 G/DL (ref 31.5–35.7)
MCV RBC AUTO: 85.6 FL (ref 79–97)
METHGB BLD QL: 0.5 % (ref 0–1.5)
MODALITY: ABNORMAL
MONOCYTES # BLD AUTO: 0.71 10*3/MM3 (ref 0.1–0.9)
MONOCYTES NFR BLD AUTO: 12.6 % (ref 5–12)
NEUTROPHILS NFR BLD AUTO: 3.63 10*3/MM3 (ref 1.7–7)
NEUTROPHILS NFR BLD AUTO: 64.4 % (ref 42.7–76)
NOTE: ABNORMAL
NRBC BLD AUTO-RTO: 0 /100 WBC (ref 0–0.2)
OXYHGB MFR BLDV: 94.6 % (ref 94–99)
PCO2 BLDA: 32.4 MM HG (ref 35–45)
PCO2 TEMP ADJ BLD: ABNORMAL MM[HG]
PH BLDA: 7.29 PH UNITS (ref 7.35–7.45)
PH, TEMP CORRECTED: ABNORMAL
PHOSPHATE SERPL-MCNC: 3.6 MG/DL (ref 2.5–4.5)
PLATELET # BLD AUTO: 137 10*3/MM3 (ref 140–450)
PMV BLD AUTO: 13.6 FL (ref 6–12)
PO2 BLDA: 81.8 MM HG (ref 75–100)
PO2 TEMP ADJ BLD: ABNORMAL MM[HG]
POTASSIUM SERPL-SCNC: 5.9 MMOL/L (ref 3.5–5.2)
PROT SERPL-MCNC: 6.2 G/DL (ref 6–8.5)
RBC # BLD AUTO: 5.06 10*6/MM3 (ref 4.14–5.8)
SAO2 % BLDCOA: 96.1 % (ref 94–100)
SODIUM SERPL-SCNC: 125 MMOL/L (ref 136–145)
TROPONIN T SERPL HS-MCNC: 57 NG/L
VENTILATOR MODE: ABNORMAL
WBC NRBC COR # BLD: 5.64 10*3/MM3 (ref 3.4–10.8)

## 2023-04-04 PROCEDURE — 83690 ASSAY OF LIPASE: CPT | Performed by: PHYSICIAN ASSISTANT

## 2023-04-04 PROCEDURE — 83735 ASSAY OF MAGNESIUM: CPT | Performed by: PHYSICIAN ASSISTANT

## 2023-04-04 PROCEDURE — 83930 ASSAY OF BLOOD OSMOLALITY: CPT | Performed by: FAMILY MEDICINE

## 2023-04-04 PROCEDURE — 82962 GLUCOSE BLOOD TEST: CPT

## 2023-04-04 PROCEDURE — 83036 HEMOGLOBIN GLYCOSYLATED A1C: CPT | Performed by: PHYSICIAN ASSISTANT

## 2023-04-04 PROCEDURE — 36415 COLL VENOUS BLD VENIPUNCTURE: CPT

## 2023-04-04 PROCEDURE — 93005 ELECTROCARDIOGRAM TRACING: CPT | Performed by: PHYSICIAN ASSISTANT

## 2023-04-04 PROCEDURE — 84484 ASSAY OF TROPONIN QUANT: CPT | Performed by: PHYSICIAN ASSISTANT

## 2023-04-04 PROCEDURE — 83050 HGB METHEMOGLOBIN QUAN: CPT

## 2023-04-04 PROCEDURE — 81001 URINALYSIS AUTO W/SCOPE: CPT | Performed by: FAMILY MEDICINE

## 2023-04-04 PROCEDURE — 84100 ASSAY OF PHOSPHORUS: CPT | Performed by: PHYSICIAN ASSISTANT

## 2023-04-04 PROCEDURE — 25010000002 CALCIUM GLUCONATE PER 10 ML: Performed by: PHYSICIAN ASSISTANT

## 2023-04-04 PROCEDURE — 36600 WITHDRAWAL OF ARTERIAL BLOOD: CPT

## 2023-04-04 PROCEDURE — 82805 BLOOD GASES W/O2 SATURATION: CPT

## 2023-04-04 PROCEDURE — 74176 CT ABD & PELVIS W/O CONTRAST: CPT

## 2023-04-04 PROCEDURE — 82375 ASSAY CARBOXYHB QUANT: CPT

## 2023-04-04 PROCEDURE — 99223 1ST HOSP IP/OBS HIGH 75: CPT | Performed by: FAMILY MEDICINE

## 2023-04-04 PROCEDURE — 84100 ASSAY OF PHOSPHORUS: CPT | Performed by: FAMILY MEDICINE

## 2023-04-04 PROCEDURE — 71045 X-RAY EXAM CHEST 1 VIEW: CPT

## 2023-04-04 PROCEDURE — 85025 COMPLETE CBC W/AUTO DIFF WBC: CPT | Performed by: PHYSICIAN ASSISTANT

## 2023-04-04 PROCEDURE — 80053 COMPREHEN METABOLIC PANEL: CPT | Performed by: PHYSICIAN ASSISTANT

## 2023-04-04 PROCEDURE — 83735 ASSAY OF MAGNESIUM: CPT | Performed by: FAMILY MEDICINE

## 2023-04-04 PROCEDURE — 99285 EMERGENCY DEPT VISIT HI MDM: CPT

## 2023-04-04 PROCEDURE — 83605 ASSAY OF LACTIC ACID: CPT | Performed by: PHYSICIAN ASSISTANT

## 2023-04-04 RX ORDER — SODIUM CHLORIDE AND POTASSIUM CHLORIDE 150; 900 MG/100ML; MG/100ML
250 INJECTION, SOLUTION INTRAVENOUS CONTINUOUS PRN
Status: DISCONTINUED | OUTPATIENT
Start: 2023-04-04 | End: 2023-04-05

## 2023-04-04 RX ORDER — DEXTROSE, SODIUM CHLORIDE, AND POTASSIUM CHLORIDE 5; .45; .3 G/100ML; G/100ML; G/100ML
150 INJECTION INTRAVENOUS CONTINUOUS PRN
Status: DISCONTINUED | OUTPATIENT
Start: 2023-04-04 | End: 2023-04-05

## 2023-04-04 RX ORDER — SODIUM CHLORIDE AND POTASSIUM CHLORIDE 300; 900 MG/100ML; MG/100ML
250 INJECTION, SOLUTION INTRAVENOUS CONTINUOUS PRN
Status: DISCONTINUED | OUTPATIENT
Start: 2023-04-04 | End: 2023-04-05

## 2023-04-04 RX ORDER — SODIUM CHLORIDE 9 MG/ML
40 INJECTION, SOLUTION INTRAVENOUS AS NEEDED
Status: DISCONTINUED | OUTPATIENT
Start: 2023-04-04 | End: 2023-04-05 | Stop reason: SDUPTHER

## 2023-04-04 RX ORDER — SODIUM CHLORIDE 0.9 % (FLUSH) 0.9 %
10 SYRINGE (ML) INJECTION AS NEEDED
Status: DISCONTINUED | OUTPATIENT
Start: 2023-04-04 | End: 2023-04-05

## 2023-04-04 RX ORDER — SODIUM CHLORIDE AND POTASSIUM CHLORIDE 150; 450 MG/100ML; MG/100ML
250 INJECTION, SOLUTION INTRAVENOUS CONTINUOUS PRN
Status: DISCONTINUED | OUTPATIENT
Start: 2023-04-04 | End: 2023-04-05

## 2023-04-04 RX ORDER — DEXTROSE MONOHYDRATE 25 G/50ML
10-50 INJECTION, SOLUTION INTRAVENOUS
Status: DISCONTINUED | OUTPATIENT
Start: 2023-04-04 | End: 2023-04-05

## 2023-04-04 RX ORDER — SODIUM CHLORIDE 450 MG/100ML
250 INJECTION, SOLUTION INTRAVENOUS CONTINUOUS PRN
Status: DISCONTINUED | OUTPATIENT
Start: 2023-04-04 | End: 2023-04-05

## 2023-04-04 RX ORDER — DEXTROSE AND SODIUM CHLORIDE 5; .9 G/100ML; G/100ML
150 INJECTION, SOLUTION INTRAVENOUS CONTINUOUS PRN
Status: DISCONTINUED | OUTPATIENT
Start: 2023-04-04 | End: 2023-04-05

## 2023-04-04 RX ORDER — SODIUM CHLORIDE 9 MG/ML
250 INJECTION, SOLUTION INTRAVENOUS CONTINUOUS PRN
Status: DISCONTINUED | OUTPATIENT
Start: 2023-04-04 | End: 2023-04-05

## 2023-04-04 RX ORDER — LEVOTHYROXINE SODIUM 0.15 MG/1
150 TABLET ORAL DAILY
Status: DISCONTINUED | OUTPATIENT
Start: 2023-04-05 | End: 2023-04-06 | Stop reason: HOSPADM

## 2023-04-04 RX ORDER — BISACODYL 10 MG
10 SUPPOSITORY, RECTAL RECTAL DAILY PRN
Status: DISCONTINUED | OUTPATIENT
Start: 2023-04-04 | End: 2023-04-06 | Stop reason: HOSPADM

## 2023-04-04 RX ORDER — AMOXICILLIN 250 MG
2 CAPSULE ORAL 2 TIMES DAILY
Status: DISCONTINUED | OUTPATIENT
Start: 2023-04-05 | End: 2023-04-06 | Stop reason: HOSPADM

## 2023-04-04 RX ORDER — CALCIUM GLUCONATE 94 MG/ML
1 INJECTION, SOLUTION INTRAVENOUS ONCE
Status: COMPLETED | OUTPATIENT
Start: 2023-04-04 | End: 2023-04-04

## 2023-04-04 RX ORDER — BISACODYL 5 MG/1
5 TABLET, DELAYED RELEASE ORAL DAILY PRN
Status: DISCONTINUED | OUTPATIENT
Start: 2023-04-04 | End: 2023-04-06 | Stop reason: HOSPADM

## 2023-04-04 RX ORDER — ACETAMINOPHEN 325 MG/1
650 TABLET ORAL EVERY 4 HOURS PRN
Status: DISCONTINUED | OUTPATIENT
Start: 2023-04-04 | End: 2023-04-06 | Stop reason: HOSPADM

## 2023-04-04 RX ORDER — ATORVASTATIN CALCIUM 20 MG/1
20 TABLET, FILM COATED ORAL NIGHTLY
Status: DISCONTINUED | OUTPATIENT
Start: 2023-04-05 | End: 2023-04-06 | Stop reason: HOSPADM

## 2023-04-04 RX ORDER — POLYETHYLENE GLYCOL 3350 17 G/17G
17 POWDER, FOR SOLUTION ORAL DAILY PRN
Status: DISCONTINUED | OUTPATIENT
Start: 2023-04-04 | End: 2023-04-06 | Stop reason: HOSPADM

## 2023-04-04 RX ORDER — ACETAMINOPHEN 650 MG/1
650 SUPPOSITORY RECTAL EVERY 4 HOURS PRN
Status: DISCONTINUED | OUTPATIENT
Start: 2023-04-04 | End: 2023-04-06 | Stop reason: HOSPADM

## 2023-04-04 RX ORDER — METOPROLOL SUCCINATE 50 MG/1
50 TABLET, EXTENDED RELEASE ORAL DAILY
Status: DISCONTINUED | OUTPATIENT
Start: 2023-04-05 | End: 2023-04-06 | Stop reason: HOSPADM

## 2023-04-04 RX ORDER — ONDANSETRON 2 MG/ML
4 INJECTION INTRAMUSCULAR; INTRAVENOUS EVERY 6 HOURS PRN
Status: DISCONTINUED | OUTPATIENT
Start: 2023-04-04 | End: 2023-04-06 | Stop reason: HOSPADM

## 2023-04-04 RX ORDER — SODIUM CHLORIDE 0.9 % (FLUSH) 0.9 %
3 SYRINGE (ML) INJECTION EVERY 12 HOURS SCHEDULED
Status: DISCONTINUED | OUTPATIENT
Start: 2023-04-05 | End: 2023-04-06 | Stop reason: HOSPADM

## 2023-04-04 RX ORDER — AMOXICILLIN 500 MG/1
1000 CAPSULE ORAL 2 TIMES DAILY
Qty: 14 CAPSULE | Refills: 0 | Status: SHIPPED | OUTPATIENT
Start: 2023-04-04 | End: 2023-04-06 | Stop reason: HOSPADM

## 2023-04-04 RX ORDER — LANCETS
EACH MISCELLANEOUS
Qty: 100 EACH | Refills: 3 | OUTPATIENT
Start: 2023-04-04

## 2023-04-04 RX ORDER — DEXTROSE, SODIUM CHLORIDE, AND POTASSIUM CHLORIDE 5; .9; .15 G/100ML; G/100ML; G/100ML
150 INJECTION INTRAVENOUS CONTINUOUS PRN
Status: DISCONTINUED | OUTPATIENT
Start: 2023-04-04 | End: 2023-04-05

## 2023-04-04 RX ORDER — NICOTINE POLACRILEX 4 MG
15 LOZENGE BUCCAL
Status: DISCONTINUED | OUTPATIENT
Start: 2023-04-04 | End: 2023-04-05 | Stop reason: SDUPTHER

## 2023-04-04 RX ORDER — POTASSIUM CHLORIDE, DEXTROSE MONOHYDRATE AND SODIUM CHLORIDE 300; 5; 900 MG/100ML; G/100ML; MG/100ML
150 INJECTION, SOLUTION INTRAVENOUS CONTINUOUS PRN
Status: DISCONTINUED | OUTPATIENT
Start: 2023-04-04 | End: 2023-04-05

## 2023-04-04 RX ORDER — ASPIRIN 81 MG/1
81 TABLET ORAL DAILY
Status: DISCONTINUED | OUTPATIENT
Start: 2023-04-05 | End: 2023-04-06 | Stop reason: HOSPADM

## 2023-04-04 RX ORDER — SODIUM CHLORIDE 0.9 % (FLUSH) 0.9 %
3-10 SYRINGE (ML) INJECTION AS NEEDED
Status: DISCONTINUED | OUTPATIENT
Start: 2023-04-04 | End: 2023-04-06 | Stop reason: HOSPADM

## 2023-04-04 RX ORDER — ACETAMINOPHEN 160 MG/5ML
650 SOLUTION ORAL EVERY 4 HOURS PRN
Status: DISCONTINUED | OUTPATIENT
Start: 2023-04-04 | End: 2023-04-06 | Stop reason: HOSPADM

## 2023-04-04 RX ORDER — SODIUM CHLORIDE 9 MG/ML
40 INJECTION, SOLUTION INTRAVENOUS AS NEEDED
Status: DISCONTINUED | OUTPATIENT
Start: 2023-04-04 | End: 2023-04-06 | Stop reason: HOSPADM

## 2023-04-04 RX ORDER — HEPARIN SODIUM 5000 [USP'U]/ML
5000 INJECTION, SOLUTION INTRAVENOUS; SUBCUTANEOUS EVERY 12 HOURS SCHEDULED
Status: DISCONTINUED | OUTPATIENT
Start: 2023-04-05 | End: 2023-04-06 | Stop reason: HOSPADM

## 2023-04-04 RX ORDER — SODIUM CHLORIDE 0.9 % (FLUSH) 0.9 %
10 SYRINGE (ML) INJECTION AS NEEDED
Status: DISCONTINUED | OUTPATIENT
Start: 2023-04-04 | End: 2023-04-06 | Stop reason: HOSPADM

## 2023-04-04 RX ORDER — TERAZOSIN 5 MG/1
5 CAPSULE ORAL NIGHTLY
Status: DISCONTINUED | OUTPATIENT
Start: 2023-04-05 | End: 2023-04-06 | Stop reason: HOSPADM

## 2023-04-04 RX ORDER — DEXTROSE AND SODIUM CHLORIDE 5; .45 G/100ML; G/100ML
150 INJECTION, SOLUTION INTRAVENOUS CONTINUOUS PRN
Status: DISCONTINUED | OUTPATIENT
Start: 2023-04-04 | End: 2023-04-05

## 2023-04-04 RX ORDER — DEXTROSE, SODIUM CHLORIDE, AND POTASSIUM CHLORIDE 5; .45; .15 G/100ML; G/100ML; G/100ML
150 INJECTION INTRAVENOUS CONTINUOUS PRN
Status: DISCONTINUED | OUTPATIENT
Start: 2023-04-04 | End: 2023-04-05

## 2023-04-04 RX ORDER — SODIUM CHLORIDE 0.9 % (FLUSH) 0.9 %
10 SYRINGE (ML) INJECTION EVERY 12 HOURS SCHEDULED
Status: DISCONTINUED | OUTPATIENT
Start: 2023-04-04 | End: 2023-04-05

## 2023-04-04 RX ADMIN — INSULIN HUMAN 5.4 UNITS/HR: 1 INJECTION, SOLUTION INTRAVENOUS at 19:40

## 2023-04-04 RX ADMIN — Medication 10 ML: at 20:01

## 2023-04-04 RX ADMIN — SODIUM CHLORIDE 1000 ML: 9 INJECTION, SOLUTION INTRAVENOUS at 19:38

## 2023-04-04 RX ADMIN — SODIUM CHLORIDE 1000 ML: 9 INJECTION, SOLUTION INTRAVENOUS at 18:16

## 2023-04-04 RX ADMIN — Medication 3 ML: at 23:55

## 2023-04-04 RX ADMIN — SODIUM CHLORIDE 1000 ML/HR: 9 INJECTION, SOLUTION INTRAVENOUS at 19:38

## 2023-04-04 RX ADMIN — CALCIUM GLUCONATE 1 G: 98 INJECTION, SOLUTION INTRAVENOUS at 19:35

## 2023-04-04 NOTE — PROGRESS NOTES
"                      Established Patient        Chief Complaint:   Chief Complaint   Patient presents with   • Blood Sugar Problem     New patient establish care with tianna. Would like to discuss blood glucose issues.   • Blurred Vision         History of Present Illness:    Miller Em is a 62 y.o. male who presents today with dizziness x 1 week. Reports he \"can't think, eat, and [he's] bumping into things.\" Reports urinary frequency HS. Symptoms have been constant. Denies sick contacts.     Patient states that he has been taking Metformin as prescribed but has been unable to check glucose at home, out of strips.     In clinic today, patient is SOA, weak, lethargic, slow to respond. Requests water. States that he has been extremely thirsty and has been urinating more frequently other than today, has only urinated once, drank 2 cups of water earlier today. Denies history of DKA.     Subjective     The following portions of the patient's history were reviewed and updated as appropriate: allergies, current medications, past family history, past medical history, past social history, past surgical history and problem list.    ALLERGIES  Allergies   Allergen Reactions   • Floxin [Ofloxacin] Hives   • Lisinopril Cough       ROS  Review of Systems   Constitutional: Positive for appetite change, chills and fatigue. Negative for diaphoresis and fever.   HENT: Positive for sore throat and tinnitus. Negative for congestion, ear pain, sinus pressure and sinus pain.    Eyes: Positive for photophobia.   Respiratory: Negative.    Cardiovascular: Negative.    Gastrointestinal: Negative.    Endocrine: Positive for polydipsia and polyuria. Negative for polyphagia.   Genitourinary: Positive for frequency and urgency. Negative for dysuria.   Musculoskeletal: Negative.    Skin: Negative.    Allergic/Immunologic: Negative.    Neurological: Positive for dizziness, weakness, light-headedness, numbness and headaches.   Hematological: " "Negative.    Psychiatric/Behavioral: Negative.        Objective     Vital Signs:   BP (!) 62/48 (BP Location: Right arm, Patient Position: Sitting, Cuff Size: Large Adult) Comment: rechecked by Marsha CHESTER, APRN student, and EMT  Pulse 100   Ht 175.3 cm (69.02\")   Wt 128 kg (282 lb)   SpO2 97%   BMI 41.62 kg/m²     Class 3 Severe Obesity (BMI >=40). Obesity-related health conditions include the following: obstructive sleep apnea. Obesity is newly identified. BMI is is above average; BMI management plan is completed. We discussed portion control and increasing exercise.       Physical Exam   Physical Exam  Constitutional:       General: He is not in acute distress.     Appearance: He is ill-appearing, toxic-appearing and diaphoretic.   HENT:      Right Ear: Tympanic membrane normal.      Left Ear: Tympanic membrane is injected and erythematous.      Nose: Nose normal.      Mouth/Throat:      Mouth: Mucous membranes are moist.   Eyes:      Pupils: Pupils are equal, round, and reactive to light.   Cardiovascular:      Rate and Rhythm: Normal rate and regular rhythm.      Pulses: Normal pulses.      Heart sounds: Normal heart sounds.   Pulmonary:      Effort: Pulmonary effort is normal. Tachypnea present. No respiratory distress.      Breath sounds: Normal breath sounds. No wheezing or rales.   Abdominal:      Palpations: Abdomen is soft.   Musculoskeletal:         General: Normal range of motion.   Skin:     General: Skin is warm.      Capillary Refill: Capillary refill takes less than 2 seconds.   Neurological:      General: No focal deficit present.      Mental Status: He is oriented to person, place, and time.         Assessment and Plan      Assessment/Plan:   Diagnoses and all orders for this visit:    1. Type 2 diabetes mellitus with other circulatory complication, without long-term current use of insulin (Primary)  -     glucose blood test strip; Patient tests blood sugar one time daily.  Diagnosis code E11.9 " " Dispense: 100 each; Refill: 3  -     Accu-Chek FastClix Lancets misc; TEST DAILY FOR DM E11.9  Dispense: 100 each; Refill: 3    2. Elevated blood sugar  -     POCT Glucose  -     POCT Glucose    3. Frequency of urination and polyuria    4. Polydipsia    5. Weakness    6. Left acute otitis media  -     amoxicillin (AMOXIL) 500 MG capsule; Take 2 capsules by mouth 2 (Two) Times a Day.  Dispense: 14 capsule; Refill: 0    7. Hypotension, unspecified hypotension type      Random blood glucose--\"Too high to read\"   Called EMS to transport patient to the hospital. Patient called his daughter to notify.   BP 62/48 sitting      Discussion Summary:  Discussed plan of care in detail with pt today; pt verb understanding and agrees.  I, ODETTE Kearney, personally performed the services described in this documentation, as scribed by Marsha Reddy in my presence, and is both accurate and complete.       I spent 40 minutes caring for Miller on this date of service. This time includes time spent by me in the following activities:preparing for the visit, reviewing tests, obtaining and/or reviewing a separately obtained history, performing a medically appropriate examination and/or evaluation , counseling and educating the patient/family/caregiver, ordering medications, tests, or procedures, referring and communicating with other health care professionals , documenting information in the medical record, independently interpreting results and communicating that information with the patient/family/caregiver and care coordination      I have reviewed and updated all copied forward information, as appropriate.  I attest to the accuracy and relevance of any unchanged information.      Follow up:  Return for Next scheduled follow up.     Patient Education:  There are no Patient Instructions on file for this visit.    ODETTE Kearney  04/05/23  08:56 EDT          Please note that portions of this note may have been completed " with a voice recognition program.

## 2023-04-04 NOTE — ED PROVIDER NOTES
"Subjective  History of Present Illness:    Chief Complaint:   Chief Complaint   Patient presents with   • Hyperglycemia   • Hypotension      History of Present Illness: Miller Em is a 62 y.o. male who presents to the emergency department complaining of hyperglycemia, hypotension and dizziness sent from PCPs office.  Patient is a non-insulin-dependent diabetic only on metformin comes in for evaluation after being seen at his PCPs office prior to arrival.  Was found to be hypotensive in his PCPs office and glucose read \"high\".  Patient denies any complaints of headache, chest pain, shortness breath, abdominal pain, nausea vomiting or diarrhea.  He states he overall just does not feel well but nothing specific.  Onset: Dizziness over the past week  Duration: Ongoing  Exacerbating / Alleviating factors: Worse with change in position, patient states when he goes from lying to standing he feels as though he is going to pass out  Associated symptoms: None      Nurses Notes reviewed and agree, including vitals, allergies, social history and prior medical history.     Review of Systems   Constitutional: Negative.    HENT: Negative.    Eyes: Negative.    Respiratory: Negative.  Negative for cough and shortness of breath.    Cardiovascular: Negative.  Negative for chest pain.   Gastrointestinal: Negative.  Negative for abdominal pain, diarrhea, nausea and vomiting.   Endocrine: Positive for polydipsia.   Genitourinary: Negative.    Musculoskeletal: Negative.    Skin: Negative.    Allergic/Immunologic: Negative.    Neurological: Positive for dizziness. Negative for headaches.        Near syncope   Psychiatric/Behavioral: Negative.    All other systems reviewed and are negative.      Past Medical History:   Diagnosis Date   • Arthritis    • Benign prostatic hyperplasia    • Cataract    • Diabetes    • Diabetes mellitus    • Gout    • Headache, migraine    • Headache, tension-type    • Hyperlipidemia    • Hypertension    • " "Obesity    • Sleep apnea    • Thyroid condition        Allergies:    Floxin [ofloxacin] and Lisinopril      Past Surgical History:   Procedure Laterality Date   • COLONOSCOPY     • EYE SURGERY      Cataract   • KNEE ARTHROSCOPY           Social History     Socioeconomic History   • Marital status: Single   Tobacco Use   • Smoking status: Former     Packs/day: 0.25     Years: 5.00     Pack years: 1.25     Types: Cigarettes     Quit date: 1/1/2008     Years since quitting: 15.2   • Smokeless tobacco: Never   • Tobacco comments:     on and off x 10-15 years 1 pack per week   Vaping Use   • Vaping Use: Never used   Substance and Sexual Activity   • Alcohol use: Yes     Alcohol/week: 6.0 standard drinks     Types: 3 Cans of beer, 3 Shots of liquor per week     Comment: 1-2 wkly   • Drug use: No   • Sexual activity: Yes     Partners: Female     Birth control/protection: Condom         Family History   Problem Relation Age of Onset   • Stroke Other    • Hyperlipidemia Other    • Stroke Father    • Hyperlipidemia Brother        Objective  Physical Exam:  BP 97/61   Pulse 89   Temp 98.5 °F (36.9 °C)   Resp 18   Ht 177.8 cm (70\")   Wt 130 kg (286 lb)   SpO2 97%   BMI 41.04 kg/m²      Physical Exam  Vitals and nursing note reviewed.   Constitutional:       General: He is not in acute distress.     Appearance: Normal appearance. He is obese. He is not ill-appearing, toxic-appearing or diaphoretic.   HENT:      Head: Normocephalic and atraumatic.      Nose: Nose normal.   Eyes:      Extraocular Movements: Extraocular movements intact.   Cardiovascular:      Rate and Rhythm: Normal rate and regular rhythm.      Heart sounds: Normal heart sounds.   Pulmonary:      Effort: Pulmonary effort is normal.   Abdominal:      General: Abdomen is flat.      Palpations: Abdomen is soft.      Tenderness: There is no abdominal tenderness. There is no guarding or rebound.   Musculoskeletal:         General: Normal range of motion.      " Cervical back: Normal range of motion.   Skin:     General: Skin is warm and dry.   Neurological:      General: No focal deficit present.      Mental Status: He is alert. Mental status is at baseline.   Psychiatric:         Mood and Affect: Mood normal.         Behavior: Behavior normal.           Procedures    ED Course:    ED Course as of 04/04/23 1955 Tue Apr 04, 2023   1729 EKG interpreted by me: Sinus bradycardia, no acute ST/T changes, borderline QT, this is an abnormal EKG [MP]   1804 WBC: 5.64 [TM]   1805 Hemoglobin: 13.7 [TM]   1805 Hematocrit: 43.3 [TM]   1805 pH, Arterial(!!): 7.295 [TM]   1817 Hemoglobin A1C(!): 17.30 [TM]      ED Course User Index  [MP] Praful Gutiérrez MD  [TM] Puneet Montilla PA-C       Lab Results (last 24 hours)     Procedure Component Value Units Date/Time    POCT Glucose [[id number]]  (Abnormal) Collected: 04/04/23 1550    Specimen: Blood Updated: 04/04/23 1551     Glucose --     Comment: BLOOD SUGAR TO HIGH TO READ.       Comprehensive Metabolic Panel [[id number]]  (Abnormal) Collected: 04/04/23 1749    Specimen: Blood Updated: 04/04/23 1827     Glucose 728 mg/dL      BUN 58 mg/dL      Creatinine 2.65 mg/dL      Sodium 125 mmol/L      Potassium 5.9 mmol/L      Comment: Slight hemolysis detected by analyzer. Results may be affected.        Chloride 90 mmol/L      CO2 14.9 mmol/L      Calcium 10.9 mg/dL      Total Protein 6.2 g/dL      Albumin 3.5 g/dL      ALT (SGPT) 27 U/L      AST (SGOT) 19 U/L      Comment: Slight hemolysis detected by analyzer. Results may be affected.        Alkaline Phosphatase 83 U/L      Total Bilirubin 0.4 mg/dL      Globulin 2.7 gm/dL      A/G Ratio 1.3 g/dL      BUN/Creatinine Ratio 21.9     Anion Gap 20.1 mmol/L      eGFR 26.1 mL/min/1.73     Narrative:      GFR Normal >60  Chronic Kidney Disease <60  Kidney Failure <15      CBC & Differential [[id number]]  (Abnormal) Collected: 04/04/23 1749    Specimen: Blood Updated: 04/04/23 1801     Narrative:      The following orders were created for panel order CBC & Differential.  Procedure                               Abnormality         Status                     ---------                               -----------         ------                     CBC Auto Differential[[id number]]        Abnormal            Final result               Scan Slide[[id number]]                                                                    Please view results for these tests on the individual orders.    Lipase [[id number]]  (Abnormal) Collected: 04/04/23 1749    Specimen: Blood Updated: 04/04/23 1824     Lipase 450 U/L     Magnesium [[id number]]  (Abnormal) Collected: 04/04/23 1749    Specimen: Blood Updated: 04/04/23 1818     Magnesium 2.5 mg/dL     Single High Sensitivity Troponin T [[id number]]  (Abnormal) Collected: 04/04/23 1749    Specimen: Blood Updated: 04/04/23 1828     HS Troponin T 57 ng/L     Narrative:      High Sensitive Troponin T Reference Range:  <10.0 ng/L- Negative Female for AMI  <15.0 ng/L- Negative Male for AMI  >=10 - Abnormal Female indicating possible myocardial injury.  >=15 - Abnormal Male indicating possible myocardial injury.   Clinicians would have to utilize clinical acumen, EKG, Troponin, and serial changes to determine if it is an Acute Myocardial Infarction or myocardial injury due to an underlying chronic condition.         CBC Auto Differential [[id number]]  (Abnormal) Collected: 04/04/23 1749    Specimen: Blood Updated: 04/04/23 1801     WBC 5.64 10*3/mm3      RBC 5.06 10*6/mm3      Hemoglobin 13.7 g/dL      Hematocrit 43.3 %      MCV 85.6 fL      MCH 27.1 pg      MCHC 31.6 g/dL      RDW 15.8 %      RDW-SD 49.1 fl      MPV 13.6 fL      Platelets 137 10*3/mm3      Neutrophil % 64.4 %      Lymphocyte % 20.7 %      Monocyte % 12.6 %      Eosinophil % 0.9 %      Basophil % 0.9 %      Immature Grans % 0.5 %      Neutrophils, Absolute 3.63 10*3/mm3      Lymphocytes, Absolute 1.17 10*3/mm3       Monocytes, Absolute 0.71 10*3/mm3      Eosinophils, Absolute 0.05 10*3/mm3      Basophils, Absolute 0.05 10*3/mm3      Immature Grans, Absolute 0.03 10*3/mm3      nRBC 0.0 /100 WBC     Hemoglobin A1c [[id number]]  (Abnormal) Collected: 04/04/23 1749    Specimen: Blood Updated: 04/04/23 1816     Hemoglobin A1C 17.30 %     Narrative:      Hemoglobin A1C Ranges:    Increased Risk for Diabetes  5.7% to 6.4%  Diabetes                     >= 6.5%  Diabetic Goal                < 7.0%    Phosphorus [[id number]]  (Normal) Collected: 04/04/23 1749    Specimen: Blood Updated: 04/04/23 1941     Phosphorus 3.6 mg/dL     Blood Gas, Arterial With Co-Ox [[id number]]  (Abnormal) Collected: 04/04/23 1757    Specimen: Arterial Blood Updated: 04/04/23 1757     Site Right Radial     Nhan's Test N/A     pH, Arterial 7.295 pH units      Comment: 84 Value below reference range        pCO2, Arterial 32.4 mm Hg      Comment: 84 Value below reference range        pO2, Arterial 81.8 mm Hg      Comment: 84 Value below reference range        HCO3, Arterial 15.8 mmol/L      Comment: 84 Value below reference range        Base Excess, Arterial -9.6 mmol/L      Comment: 84 Value below reference range        O2 Saturation, Arterial 96.1 %      Hematocrit, Blood Gas 42.5 %      Oxyhemoglobin 94.6 %      Methemoglobin 0.50 %      Carboxyhemoglobin 1.0 %      A-a DO2 25.1 mmHg      Barometric Pressure for Blood Gas 730 mmHg      Modality Room Air     Ventilator Mode NA     Note --     Collected by PHILLIP RRT     Comment: Meter: [id number]     :  [id number]        pH, Temp Corrected --     pCO2, Temperature Corrected --     pO2, Temperature Corrected --    Lactic Acid, Plasma [[id number]]  (Abnormal) Collected: 04/04/23 1818    Specimen: Blood Updated: 04/04/23 1854     Lactate 2.2 mmol/L            No radiology results from the last 24 hrs       JACK Em is a 62 y.o. male who presents to the emergency department for evaluation of  hypotension, dizziness, elevated blood glucose    Differential diagnosis includes GABBY, DKA, dehydration among other etiologies.    CBC, CMP, ABG, magnesium, A1c, lipase, urinalysis, chest x-ray, CT scan abdomen pelvis ordered for further evaluation of the patient's presentation.    Chart review if available included outside testing, previous visits, prior labs, prior imaging, available notes from prior evaluations or visits with specialists, medication list, allergies, past medical history, past surgical history when applicable.    Patient was treated with IV fluids and insulin via Glucomander protocol    Dr. Valdez requests noncontrasted CT scan of the abdomen pelvis to rule out obstruction given GABBY and elevated lipase prior to excepting    Plan for disposition is likely admission to the hospital.  Patient/family comfortable with and understanding of the plan.      Final diagnoses:   Diabetic ketoacidosis without coma associated with diabetes mellitus due to underlying condition   GABBY (acute kidney injury)   Hypotension, unspecified hypotension type        Puneet Montilla PA-C  04/05/23 [id number]

## 2023-04-05 LAB
ANION GAP SERPL CALCULATED.3IONS-SCNC: 10.3 MMOL/L (ref 5–15)
ANION GAP SERPL CALCULATED.3IONS-SCNC: 12 MMOL/L (ref 5–15)
ANION GAP SERPL CALCULATED.3IONS-SCNC: 13.1 MMOL/L (ref 5–15)
ANION GAP SERPL CALCULATED.3IONS-SCNC: 17.6 MMOL/L (ref 5–15)
BACTERIA UR QL AUTO: ABNORMAL /HPF
BILIRUB UR QL STRIP: NEGATIVE
BUN SERPL-MCNC: 45 MG/DL (ref 8–23)
BUN SERPL-MCNC: 47 MG/DL (ref 8–23)
BUN SERPL-MCNC: 52 MG/DL (ref 8–23)
BUN SERPL-MCNC: 59 MG/DL (ref 8–23)
BUN/CREAT SERPL: 23.8 (ref 7–25)
BUN/CREAT SERPL: 27.3 (ref 7–25)
BUN/CREAT SERPL: 27.8 (ref 7–25)
BUN/CREAT SERPL: 29.6 (ref 7–25)
CALCIUM SPEC-SCNC: 10.4 MG/DL (ref 8.6–10.5)
CALCIUM SPEC-SCNC: 10.6 MG/DL (ref 8.6–10.5)
CALCIUM SPEC-SCNC: 11 MG/DL (ref 8.6–10.5)
CALCIUM SPEC-SCNC: 11.2 MG/DL (ref 8.6–10.5)
CHLORIDE SERPL-SCNC: 107 MMOL/L (ref 98–107)
CHLORIDE SERPL-SCNC: 107 MMOL/L (ref 98–107)
CHLORIDE SERPL-SCNC: 108 MMOL/L (ref 98–107)
CHLORIDE SERPL-SCNC: 98 MMOL/L (ref 98–107)
CLARITY UR: CLEAR
CO2 SERPL-SCNC: 16.4 MMOL/L (ref 22–29)
CO2 SERPL-SCNC: 19 MMOL/L (ref 22–29)
CO2 SERPL-SCNC: 20.9 MMOL/L (ref 22–29)
CO2 SERPL-SCNC: 22.7 MMOL/L (ref 22–29)
COLOR UR: YELLOW
CREAT SERPL-MCNC: 1.52 MG/DL (ref 0.76–1.27)
CREAT SERPL-MCNC: 1.72 MG/DL (ref 0.76–1.27)
CREAT SERPL-MCNC: 1.87 MG/DL (ref 0.76–1.27)
CREAT SERPL-MCNC: 2.48 MG/DL (ref 0.76–1.27)
D-LACTATE SERPL-SCNC: 1.7 MMOL/L (ref 0.5–2)
D-LACTATE SERPL-SCNC: 2.4 MMOL/L (ref 0.5–2)
DEPRECATED RDW RBC AUTO: 46.9 FL (ref 37–54)
EGFRCR SERPLBLD CKD-EPI 2021: 28.3 ML/MIN/1.73
EGFRCR SERPLBLD CKD-EPI 2021: 39.7 ML/MIN/1.73
EGFRCR SERPLBLD CKD-EPI 2021: 43.8 ML/MIN/1.73
EGFRCR SERPLBLD CKD-EPI 2021: 50.9 ML/MIN/1.73
ERYTHROCYTE [DISTWIDTH] IN BLOOD BY AUTOMATED COUNT: 15.6 % (ref 12.3–15.4)
GEN 5 2HR TROPONIN T REFLEX: 45 NG/L
GLUCOSE BLDC GLUCOMTR-MCNC: 128 MG/DL (ref 70–130)
GLUCOSE BLDC GLUCOMTR-MCNC: 133 MG/DL (ref 70–130)
GLUCOSE BLDC GLUCOMTR-MCNC: 142 MG/DL (ref 70–130)
GLUCOSE BLDC GLUCOMTR-MCNC: 154 MG/DL (ref 70–130)
GLUCOSE BLDC GLUCOMTR-MCNC: 185 MG/DL (ref 70–130)
GLUCOSE BLDC GLUCOMTR-MCNC: 195 MG/DL (ref 70–130)
GLUCOSE BLDC GLUCOMTR-MCNC: 196 MG/DL (ref 70–130)
GLUCOSE BLDC GLUCOMTR-MCNC: 197 MG/DL (ref 70–130)
GLUCOSE BLDC GLUCOMTR-MCNC: 278 MG/DL (ref 70–130)
GLUCOSE BLDC GLUCOMTR-MCNC: 290 MG/DL (ref 70–130)
GLUCOSE BLDC GLUCOMTR-MCNC: 321 MG/DL (ref 70–130)
GLUCOSE BLDC GLUCOMTR-MCNC: 344 MG/DL (ref 70–130)
GLUCOSE BLDC GLUCOMTR-MCNC: 359 MG/DL (ref 70–130)
GLUCOSE BLDC GLUCOMTR-MCNC: 420 MG/DL (ref 70–130)
GLUCOSE BLDC GLUCOMTR-MCNC: 471 MG/DL (ref 70–130)
GLUCOSE BLDC GLUCOMTR-MCNC: 557 MG/DL (ref 70–130)
GLUCOSE SERPL-MCNC: 120 MG/DL (ref 65–99)
GLUCOSE SERPL-MCNC: 174 MG/DL (ref 65–99)
GLUCOSE SERPL-MCNC: 288 MG/DL (ref 65–99)
GLUCOSE SERPL-MCNC: 552 MG/DL (ref 65–99)
GLUCOSE UR STRIP-MCNC: ABNORMAL MG/DL
HCT VFR BLD AUTO: 43.6 % (ref 37.5–51)
HGB BLD-MCNC: 14.3 G/DL (ref 13–17.7)
HGB UR QL STRIP.AUTO: ABNORMAL
HYALINE CASTS UR QL AUTO: ABNORMAL /LPF
KETONES UR QL STRIP: ABNORMAL
LEUKOCYTE ESTERASE UR QL STRIP.AUTO: NEGATIVE
MAGNESIUM SERPL-MCNC: 2.5 MG/DL (ref 1.6–2.4)
MAGNESIUM SERPL-MCNC: 2.6 MG/DL (ref 1.6–2.4)
MCH RBC QN AUTO: 27.2 PG (ref 26.6–33)
MCHC RBC AUTO-ENTMCNC: 32.8 G/DL (ref 31.5–35.7)
MCV RBC AUTO: 82.9 FL (ref 79–97)
NITRITE UR QL STRIP: NEGATIVE
OSMOLALITY SERPL: 335 MOSM/KG (ref 280–301)
PH UR STRIP.AUTO: <=5 [PH] (ref 5–8)
PHOSPHATE SERPL-MCNC: 2.1 MG/DL (ref 2.5–4.5)
PHOSPHATE SERPL-MCNC: 2.3 MG/DL (ref 2.5–4.5)
PHOSPHATE SERPL-MCNC: 2.5 MG/DL (ref 2.5–4.5)
PHOSPHATE SERPL-MCNC: 3.1 MG/DL (ref 2.5–4.5)
PLATELET # BLD AUTO: 124 10*3/MM3 (ref 140–450)
PMV BLD AUTO: 13.2 FL (ref 6–12)
POTASSIUM SERPL-SCNC: 4.1 MMOL/L (ref 3.5–5.2)
POTASSIUM SERPL-SCNC: 4.4 MMOL/L (ref 3.5–5.2)
POTASSIUM SERPL-SCNC: 4.8 MMOL/L (ref 3.5–5.2)
POTASSIUM SERPL-SCNC: 5.7 MMOL/L (ref 3.5–5.2)
PROT UR QL STRIP: NEGATIVE
RBC # BLD AUTO: 5.26 10*6/MM3 (ref 4.14–5.8)
RBC # UR STRIP: ABNORMAL /HPF
REF LAB TEST METHOD: ABNORMAL
SODIUM SERPL-SCNC: 132 MMOL/L (ref 136–145)
SODIUM SERPL-SCNC: 138 MMOL/L (ref 136–145)
SODIUM SERPL-SCNC: 141 MMOL/L (ref 136–145)
SODIUM SERPL-SCNC: 141 MMOL/L (ref 136–145)
SP GR UR STRIP: 1.02 (ref 1–1.03)
SQUAMOUS #/AREA URNS HPF: ABNORMAL /HPF
TROPONIN T DELTA: -2 NG/L
TROPONIN T SERPL HS-MCNC: 37 NG/L
TROPONIN T SERPL HS-MCNC: 47 NG/L
UROBILINOGEN UR QL STRIP: ABNORMAL
WBC # UR STRIP: ABNORMAL /HPF
WBC NRBC COR # BLD: 5.03 10*3/MM3 (ref 3.4–10.8)

## 2023-04-05 PROCEDURE — 82962 GLUCOSE BLOOD TEST: CPT

## 2023-04-05 PROCEDURE — 80048 BASIC METABOLIC PNL TOTAL CA: CPT | Performed by: FAMILY MEDICINE

## 2023-04-05 PROCEDURE — 25010000002 HEPARIN (PORCINE) PER 1000 UNITS: Performed by: FAMILY MEDICINE

## 2023-04-05 PROCEDURE — 99232 SBSQ HOSP IP/OBS MODERATE 35: CPT | Performed by: INTERNAL MEDICINE

## 2023-04-05 PROCEDURE — 84100 ASSAY OF PHOSPHORUS: CPT | Performed by: FAMILY MEDICINE

## 2023-04-05 PROCEDURE — 83735 ASSAY OF MAGNESIUM: CPT | Performed by: FAMILY MEDICINE

## 2023-04-05 PROCEDURE — 83605 ASSAY OF LACTIC ACID: CPT | Performed by: FAMILY MEDICINE

## 2023-04-05 PROCEDURE — 85027 COMPLETE CBC AUTOMATED: CPT | Performed by: FAMILY MEDICINE

## 2023-04-05 PROCEDURE — 0 POTASSIUM CHLORIDE PER 2 MEQ: Performed by: FAMILY MEDICINE

## 2023-04-05 PROCEDURE — 84484 ASSAY OF TROPONIN QUANT: CPT | Performed by: FAMILY MEDICINE

## 2023-04-05 PROCEDURE — 63710000001 INSULIN DETEMIR PER 5 UNITS: Performed by: INTERNAL MEDICINE

## 2023-04-05 PROCEDURE — 63710000001 INSULIN ASPART PER 5 UNITS: Performed by: INTERNAL MEDICINE

## 2023-04-05 PROCEDURE — 97161 PT EVAL LOW COMPLEX 20 MIN: CPT

## 2023-04-05 PROCEDURE — 84484 ASSAY OF TROPONIN QUANT: CPT | Performed by: PHYSICIAN ASSISTANT

## 2023-04-05 PROCEDURE — 97165 OT EVAL LOW COMPLEX 30 MIN: CPT

## 2023-04-05 RX ORDER — NICOTINE POLACRILEX 4 MG
15 LOZENGE BUCCAL
Status: DISCONTINUED | OUTPATIENT
Start: 2023-04-05 | End: 2023-04-05 | Stop reason: SDUPTHER

## 2023-04-05 RX ORDER — INSULIN ASPART 100 [IU]/ML
0-7 INJECTION, SOLUTION INTRAVENOUS; SUBCUTANEOUS
Status: DISCONTINUED | OUTPATIENT
Start: 2023-04-05 | End: 2023-04-05 | Stop reason: SDUPTHER

## 2023-04-05 RX ORDER — INSULIN ASPART 100 [IU]/ML
0-14 INJECTION, SOLUTION INTRAVENOUS; SUBCUTANEOUS
Status: DISCONTINUED | OUTPATIENT
Start: 2023-04-05 | End: 2023-04-06 | Stop reason: HOSPADM

## 2023-04-05 RX ORDER — DEXTROSE MONOHYDRATE 25 G/50ML
25 INJECTION, SOLUTION INTRAVENOUS
Status: DISCONTINUED | OUTPATIENT
Start: 2023-04-05 | End: 2023-04-06 | Stop reason: HOSPADM

## 2023-04-05 RX ORDER — NICOTINE POLACRILEX 4 MG
15 LOZENGE BUCCAL
Status: DISCONTINUED | OUTPATIENT
Start: 2023-04-05 | End: 2023-04-06 | Stop reason: HOSPADM

## 2023-04-05 RX ADMIN — INSULIN DETEMIR 10 UNITS: 100 INJECTION, SOLUTION SUBCUTANEOUS at 20:20

## 2023-04-05 RX ADMIN — SENNOSIDES AND DOCUSATE SODIUM 2 TABLET: 50; 8.6 TABLET ORAL at 00:02

## 2023-04-05 RX ADMIN — METOPROLOL SUCCINATE 50 MG: 50 TABLET, EXTENDED RELEASE ORAL at 08:18

## 2023-04-05 RX ADMIN — TERAZOSIN HYDROCHLORIDE 5 MG: 5 CAPSULE ORAL at 20:15

## 2023-04-05 RX ADMIN — HEPARIN SODIUM 5000 UNITS: 5000 INJECTION INTRAVENOUS; SUBCUTANEOUS at 20:15

## 2023-04-05 RX ADMIN — SENNOSIDES AND DOCUSATE SODIUM 2 TABLET: 50; 8.6 TABLET ORAL at 08:17

## 2023-04-05 RX ADMIN — HEPARIN SODIUM 5000 UNITS: 5000 INJECTION INTRAVENOUS; SUBCUTANEOUS at 00:01

## 2023-04-05 RX ADMIN — Medication 3 ML: at 08:18

## 2023-04-05 RX ADMIN — ASPIRIN 81 MG: 81 TABLET, COATED ORAL at 08:17

## 2023-04-05 RX ADMIN — DEXTROSE AND SODIUM CHLORIDE 150 ML/HR: 5; 450 INJECTION, SOLUTION INTRAVENOUS at 05:15

## 2023-04-05 RX ADMIN — INSULIN HUMAN 4.9 UNITS/HR: 1 INJECTION, SOLUTION INTRAVENOUS at 09:34

## 2023-04-05 RX ADMIN — INSULIN ASPART 8 UNITS: 100 INJECTION, SOLUTION INTRAVENOUS; SUBCUTANEOUS at 17:47

## 2023-04-05 RX ADMIN — HEPARIN SODIUM 5000 UNITS: 5000 INJECTION INTRAVENOUS; SUBCUTANEOUS at 08:17

## 2023-04-05 RX ADMIN — TERAZOSIN HYDROCHLORIDE 5 MG: 5 CAPSULE ORAL at 00:02

## 2023-04-05 RX ADMIN — SENNOSIDES AND DOCUSATE SODIUM 2 TABLET: 50; 8.6 TABLET ORAL at 20:15

## 2023-04-05 RX ADMIN — DEXTROSE AND SODIUM CHLORIDE 150 ML/HR: 5; 450 INJECTION, SOLUTION INTRAVENOUS at 11:29

## 2023-04-05 RX ADMIN — LEVOTHYROXINE SODIUM 150 MCG: 150 TABLET ORAL at 08:17

## 2023-04-05 RX ADMIN — Medication 10 ML: at 08:17

## 2023-04-05 RX ADMIN — ATORVASTATIN CALCIUM 20 MG: 20 TABLET, FILM COATED ORAL at 20:15

## 2023-04-05 RX ADMIN — SODIUM CHLORIDE 250 ML/HR: 4.5 INJECTION, SOLUTION INTRAVENOUS at 04:49

## 2023-04-05 RX ADMIN — POTASSIUM CHLORIDE AND SODIUM CHLORIDE 250 ML/HR: 450; 150 INJECTION, SOLUTION INTRAVENOUS at 00:37

## 2023-04-05 RX ADMIN — ATORVASTATIN CALCIUM 20 MG: 20 TABLET, FILM COATED ORAL at 00:02

## 2023-04-05 RX ADMIN — Medication 3 ML: at 20:15

## 2023-04-05 RX ADMIN — INSULIN DETEMIR 10 UNITS: 100 INJECTION, SOLUTION SUBCUTANEOUS at 11:28

## 2023-04-05 NOTE — PLAN OF CARE
Goal Outcome Evaluation:  Plan of Care Reviewed With: patient        Progress: no change  Outcome Evaluation: Transfer from ICU.  VSS.  No changes in pt condition to report.  WIll monitor.

## 2023-04-05 NOTE — THERAPY DISCHARGE NOTE
Patient Name: Miller Em  : 1960    MRN: [id number]                              Today's Date: 2023       Admit Date: 2023    Visit Dx:     ICD-10-CM ICD-9-CM   1. Diabetic ketoacidosis without coma associated with diabetes mellitus due to underlying condition  E08.10 249.11   2. GABBY (acute kidney injury)  N17.9 584.9   3. Hypotension, unspecified hypotension type  I95.9 458.9     Patient Active Problem List   Diagnosis   • Benign essential hypertension   • Benign prostatic hypertrophy   • Gout   • Hepatitis   • Hyperlipidemia   • Hyperparathyroidism   • Hypertension   • Hypothyroidism   • Hypoxia   • RIGO on CPAP   • RLS (restless legs syndrome)   • Diabetes mellitus without complication   • Vitamin D deficiency   • Hypercalcemia   • Morbid obesity   • Precordial pain   • Diabetic ketoacidosis without coma associated with diabetes mellitus due to underlying condition     Past Medical History:   Diagnosis Date   • Arthritis    • Benign prostatic hyperplasia    • Cataract    • Diabetes    • Diabetes mellitus    • Gout    • Headache, migraine    • Headache, tension-type    • Hyperlipidemia    • Hypertension    • Impaired mobility    • Obesity    • Sleep apnea    • Thyroid condition      Past Surgical History:   Procedure Laterality Date   • COLONOSCOPY     • EYE SURGERY      Cataract   • KNEE ARTHROSCOPY        General Information     [facility] Name 23 1311          OT Time and Intention    Document Type discharge evaluation/summary  -     Mode of Treatment occupational therapy  -     Row Name 23 1311          General Information    Patient Profile Reviewed yes  -     Prior Level of Function independent:;ADL's;community mobility  -     Row Name 23 1311          Living Environment    People in Home alone  -     Row Name 23 1311          Home Main Entrance    Number of Stairs, Main Entrance none  -     Row Name 23 1311          Stairs Within Home, Primary     Stairs, Within Home, Primary to 2nd floor of his home  -     Number of Stairs, Within Home, Primary twelve  -[facility] Name 04/05/23 1311          Cognition    Orientation Status (Cognition) oriented x 4  -           User Key  (r) = Recorded By, (t) = Taken By, (c) = Cosigned By    Initials Name Provider Type     Betsy Barragan Occupational Therapist                 Mobility/ADL's     [facility] Name 04/05/23 1312          Bed Mobility    Bed Mobility supine-sit;sit-supine  -     Supine-Sit [place] (Bed Mobility) modified independence  -     Sit-Supine [place] (Bed Mobility) modified independence  -     Assistive Device (Bed Mobility) head of bed elevated  -[facility] Name 04/05/23 1312          Sit-Stand Transfer    Sit-Stand [place] (Transfers) independent  [facility]     Comment, (Sit-Stand Transfer) use of gait belt  -[facility] Name 04/05/23 1312          Functional Mobility    Functional Mobility- Ind. Level standby assist  -     Functional Mobility-Distance (Feet) 230  -[facility] Name 04/05/23 1312          Activities of Daily Living    BADL Assessment/Intervention bathing;upper body dressing;lower body dressing;grooming;feeding;toileting  -[facility] Name 04/05/23 1312          Bathing Assessment/Intervention    [place] Level (Bathing) set up  -[facility] Name 04/05/23 1312          Upper Body Dressing Assessment/Training    [place] Level (Upper Body Dressing) independent  -[facility] Name 04/05/23 1312          Lower Body Dressing Assessment/Training    [place] Level (Lower Body Dressing) independent  -[facility] Name 04/05/23 1312          Grooming Assessment/Training    [place] Level (Grooming) independent  -[facility] Name 04/05/23 1312          Self-Feeding Assessment/Training    [place] Level (Feeding) independent  -[facility] Name 04/05/23 1312          Toileting Assessment/Training    [place] Level (Toileting) standby assist  [facility]           User Key  (r)  = Recorded By, (t) = Taken By, (c) = Cosigned By    Initials Name Provider Type     Btesy Barragan Occupational Therapist               Obj/Interventions     Row Name 04/05/23 1314          Sensory Assessment (Somatosensory)    Sensory Assessment (Somatosensory) sensation intact  -AH     Row Name 04/05/23 1314          Vision Assessment/Intervention    Visual Impairment/Limitations WFL  -AH     Row Name 04/05/23 1314          Range of Motion Comprehensive    General Range of Motion bilateral upper extremity ROM WFL  -AH     Row Name 04/05/23 1314          Strength Comprehensive (MMT)    Comment, General Manual Muscle Testing (MMT) Assessment [facility]           User Key  (r) = Recorded By, (t) = Taken By, (c) = Cosigned By    Initials Name Provider Type     Betsy Barragan Occupational Therapist               Goals/Plan    No documentation.                Clinical Impression     Row Name 04/05/23 1314          Pain Assessment    Pretreatment Pain Rating 0/10 - no pain  -     Posttreatment Pain Rating 0/10 - no pain  -     Pain Intervention(s) Repositioned;Ambulation/increased activity  -AH     Row Name 04/05/23 1314          Plan of Care Review    Plan of Care Reviewed With patient  -     Progress no change  -     Outcome Evaluation Pt seen for OT evaluation today.  Pt independent with bed mobility, independent to stand and sba to walk 230'.  Pt able to stand and perform toileting tasks and has no skilled needs for self care tasks.  Pt is at this baseline functional level.  OT will sign off.  -AH     Row Name 04/05/23 1314          Therapy Assessment/Plan (OT)    Patient/Family Therapy Goal Statement (OT) d/c home  -     Criteria for Skilled Therapeutic Interventions Met (OT) no problems identified which require skilled intervention  -     Therapy Frequency (OT) evaluation only  -AH     Row Name 04/05/23 1314          Therapy Plan Review/Discharge Plan (OT)    Anticipated Discharge Disposition (OT)  home  -     Row Name 04/05/23 1314          Vital Signs    Pre Systolic BP Rehab 98  -AH     Pre Treatment Diastolic BP 65  -AH     Intra Systolic BP Rehab 114  -AH     Intra Treatment Diastolic BP 67  -AH     Post Systolic BP Rehab 123  -AH     Post Treatment Diastolic BP 80  -AH     Row Name 04/05/23 1314          Positioning and Restraints    Pre-Treatment Position in bed  -AH     Post Treatment Position bed  -AH     In Bed fowlers;call light within reach;encouraged to call for assist;patient within staff view  -           User Key  (r) = Recorded By, (t) = Taken By, (c) = Cosigned By    Initials Name Provider Type    Betsy Gómez Occupational Therapist               Outcome Measures     Row Name 04/05/23 1317          How much help from another is currently needed...    Putting on and taking off regular lower body clothing? 4  -AH     Bathing (including washing, rinsing, and drying) 4  -AH     Toileting (which includes using toilet bed pan or urinal) 3  -AH     Putting on and taking off regular upper body clothing 4  -AH     Taking care of personal grooming (such as brushing teeth) 4  -AH     Eating meals 4  -AH     AM-PAC 6 Clicks Score (OT) 23  -     Row Name 04/05/23 1200          How much help from another person do you currently need...    Turning from your back to your side while in flat bed without using bedrails? 4  -JR (r) LG (t) JR (c)     Moving from lying on back to sitting on the side of a flat bed without bedrails? 4  -JR (r) LG (t) JR (c)     Moving to and from a bed to a chair (including a wheelchair)? 4  -JR (r) LG (t) JR (c)     Standing up from a chair using your arms (e.g., wheelchair, bedside chair)? 4  -JR (r) LG (t) JR (c)     Climbing 3-5 steps with a railing? 4  -JR (r) LG (t) JR (c)     To walk in hospital room? 4  -JR (r) LG (t) JR (c)     AM-PAC 6 Clicks Score (PT) 24  -JR (r) LG (t)     Highest level of mobility 8 --> Walked 250 feet or more  -JR (r) LG (t)     Row Name  04/05/23 1317 04/05/23 1200       Functional Assessment    Outcome Measure Options AM-PAC 6 Clicks Daily Activity (OT)  - AM-PAC 6 Clicks Basic Mobility (PT)  - (r) LG (t) JR (c)          User Key  (r) = Recorded By, (t) = Taken By, (c) = Cosigned By    Initials Name Provider Type     Betsy Barragan Occupational Therapist    Dottie Dickerson, PT Physical Therapist    Daija Penaloza, PT Student PT Student                Occupational Therapy Education     Title: PT OT SLP Therapies (In Progress)     Topic: Occupational Therapy (Done)     Point: ADL training (Done)     Description:   Instruct learner(s) on proper safety adaptation and remediation techniques during self care or transfers.   Instruct in proper use of assistive devices.              Learning Progress Summary           Patient Acceptance, E,TB, VU by  at 4/5/2023 1318    Comment: Role of OT                               User Key     Initials Effective Dates Name Provider Type [facility] 06/16/21 -  Betsy Barragan Occupational Therapist OT              OT Recommendation and Plan  Therapy Frequency (OT): evaluation only  Plan of Care Review  Plan of Care Reviewed With: patient  Progress: no change  Outcome Evaluation: Pt seen for OT evaluation today.  Pt independent with bed mobility, independent to stand and sba to walk 230'.  Pt able to stand and perform toileting tasks and has no skilled needs for self care tasks.  Pt is at this baseline functional level.  OT will sign off.     Time Calculation:    Time Calculation- OT     Row Name 04/05/23 1318             Time Calculation- OT    OT Start Time 1106  -      OT Received On 04/05/23  -         Untimed Charges    OT Eval/Re-eval Minutes 46  -         Total Minutes    Untimed Charges Total Minutes 46  -       Total Minutes 46  -            User Key  (r) = Recorded By, (t) = Taken By, (c) = Cosigned By    Initials Name Provider Type    Betsy Gómez Occupational Therapist               Therapy Charges for Today     Code Description Service Date Service Provider Modifiers Qty    47791893691 HC OT EVAL LOW COMPLEXITY 3 4/5/2023 Betsy Barragan 1               Betsy Barragan  4/5/2023

## 2023-04-05 NOTE — PLAN OF CARE
Goal Outcome Evaluation:  Plan of Care Reviewed With: patient        Progress: no change  Outcome Evaluation: Pt seen for OT evaluation today.  Pt independent with bed mobility, independent to stand and sba to walk 230'.  Pt able to stand and perform toileting tasks and has no skilled needs for self care tasks.  Pt is at this baseline functional level.  OT will sign off.

## 2023-04-05 NOTE — THERAPY DISCHARGE NOTE
Patient Name: Miller Em  : 1960    MRN: [id number]                              Today's Date: 2023       Admit Date: 2023    Visit Dx:     ICD-10-CM ICD-9-CM   1. Diabetic ketoacidosis without coma associated with diabetes mellitus due to underlying condition  E08.10 249.11   2. GABBY (acute kidney injury)  N17.9 584.9   3. Hypotension, unspecified hypotension type  I95.9 458.9     Patient Active Problem List   Diagnosis   • Benign essential hypertension   • Benign prostatic hypertrophy   • Gout   • Hepatitis   • Hyperlipidemia   • Hyperparathyroidism   • Hypertension   • Hypothyroidism   • Hypoxia   • RIGO on CPAP   • RLS (restless legs syndrome)   • Diabetes mellitus without complication   • Vitamin D deficiency   • Hypercalcemia   • Morbid obesity   • Precordial pain   • Diabetic ketoacidosis without coma associated with diabetes mellitus due to underlying condition     Past Medical History:   Diagnosis Date   • Arthritis    • Benign prostatic hyperplasia    • Cataract    • Diabetes    • Diabetes mellitus    • Gout    • Headache, migraine    • Headache, tension-type    • Hyperlipidemia    • Hypertension    • Impaired mobility    • Obesity    • Sleep apnea    • Thyroid condition      Past Surgical History:   Procedure Laterality Date   • COLONOSCOPY     • EYE SURGERY      Cataract   • KNEE ARTHROSCOPY        General Information     Row Name 23 1149          Physical Therapy Time and Intention    Document Type discharge evaluation/summary (P)   -LG     Mode of Treatment physical therapy (P)   -LG     Row Name 23 1149          General Information    Patient Profile Reviewed yes (P)   -LG     Prior Level of Function independent:;ADL's;all household mobility;community mobility;driving;using stairs (P)   -LG     Barriers to Rehab none identified (P)   -LG     Row Name 23 1149          Living Environment    People in Home alone (P)   -LG     Row Name 23 1149          Home Main  Entrance    Number of Stairs, Main Entrance none (P)   -LG     Row Name 04/05/23 1149          Stairs Within Home, Primary    Number of Stairs, Within Home, Primary twelve (P)   -LG     Row Name 04/05/23 1149          Cognition    Orientation Status (Cognition) oriented x 4 (P)   -LG           User Key  (r) = Recorded By, (t) = Taken By, (c) = Cosigned By    Initials Name Provider Type    LG Daija Mantilla, PT Student PT Student               Mobility     Row Name 04/05/23 1150          Bed Mobility    Bed Mobility supine-sit;sit-supine (P)   -LG     Supine-Sit [place] (Bed Mobility) modified independence (P)   -LG     Sit-Supine [place] (Bed Mobility) modified independence (P)   -LG     Assistive Device (Bed Mobility) head of bed elevated (P)   -LG     Row Name 04/05/23 1150          Bed-Chair Transfer    Bed-Chair [place] (Transfers) not tested (P)   -LG     Row Name 04/05/23 1150          Sit-Stand Transfer    Sit-Stand [place] (Transfers) independent (P)   -LG     Assistive Device (Sit-Stand Transfers) other (see comments) (P)   gait belt  -LG     Comment, (Sit-Stand Transfer) x3 (P)   -LG     Row Name 04/05/23 1150          Gait/Stairs (Locomotion)    [place] Level (Gait) standby assist (P)   -LG     Assistive Device (Gait) other (see comments) (P)   gait belt  -LG     Distance in Feet (Gait) 230' (P)   -LG     Deviations/Abnormal Patterns (Gait) base of support, wide (P)   -LG           User Key  (r) = Recorded By, (t) = Taken By, (c) = Cosigned By    Initials Name Provider Type    LG Daija Mantilla, PT Student PT Student               Obj/Interventions     Row Name 04/05/23 1152          Range of Motion Comprehensive    General Range of Motion bilateral lower extremity ROM WFL (P)   -[facility] Name 04/05/23 1152          Strength Comprehensive (MMT)    Comment, General Manual Muscle Testing (MMT) Assessment BLE grossly 4/5 MMT (P)   -[facility] Name 04/05/23 1152          Balance     Balance Assessment sitting static balance;sit to stand dynamic balance;standing dynamic balance (P)   -LG     Static Sitting Balance independent (P)   -LG     Position, Sitting Balance unsupported;sitting edge of bed (P)   -LG     Sit to Stand Dynamic Balance independent (P)   -LG     Dynamic Standing Balance standby assist (P)   -LG     Row Name 04/05/23 1152          Sensory Assessment (Somatosensory)    Sensory Assessment (Somatosensory) sensation intact;bilateral LE (P)   -LG           User Key  (r) = Recorded By, (t) = Taken By, (c) = Cosigned By    Initials Name Provider Type    LG Daija Mantilla, PT Student PT Student               Goals/Plan    No documentation.                Clinical Impression     Row Name 04/05/23 1153          Pain    Pretreatment Pain Rating 0/10 - no pain (P)   -LG     Posttreatment Pain Rating 0/10 - no pain (P)   -LG     Row Name 04/05/23 1153          Plan of Care Review    Plan of Care Reviewed With patient (P)   -LG     Progress no change (P)   -LG     Outcome Evaluation Pt was agreeable and participated in IE this date . Pt was supine in the bed upon PT entry into the room. Pt reports he lives alone in a two story home w/ 12 steps inside the home.Pt reports he is independent w/ ADL's, household and community mobility at baseline. Pt's BP was taken in the supine position and was 98/65. Pt performed supine to sit w/ Mod I and HOB elevated. Pt sat EOB unsupported ~5 minutes independently displaying good static sitting balance. Pt's BP was reassessed in sitting and was 114/67. Pt performed sit to stand independently w/ gait belt. Pt's BP was reassessed while standing and was 123/80. Pt ambulated 230' w/ SBA and gait belt. Pt is currently at baseline for mobility and has no identified skilled PT needs at this time. PT encouraged pt to continue mobility w/ nursing during his inpatient stay in order to prevent muscle weakness secondary to being in the bed. PT will sign off at this  time. (P)   -LG     Row Name 04/05/23 1153          Therapy Assessment/Plan (PT)    Patient/Family Therapy Goals Statement (PT) To eat (P)   -LG     Criteria for Skilled Interventions Met (PT) no (P)   -LG     Therapy Frequency (PT) evaluation only (P)   -LG     Row Name 04/05/23 1153          Vital Signs    Pre Systolic BP Rehab 98 (P)   supine  -LG     Pre Treatment Diastolic BP 65 (P)   supine  -LG     Intra Systolic BP Rehab 114 (P)   sitting EOB  -LG     Intra Treatment Diastolic BP 67 (P)   sitting EOB  -LG     Post Systolic BP Rehab 123 (P)   standing  -LG     Post Treatment Diastolic BP 80 (P)   standing  -LG     O2 Delivery Pre Treatment room air (P)   -LG     O2 Delivery Intra Treatment room air (P)   -LG     O2 Delivery Post Treatment room air (P)   -LG     Pre Patient Position Supine (P)   -LG     Intra Patient Position Standing (P)   -LG     Post Patient Position Supine (P)   -LG     Row Name 04/05/23 1153          Positioning and Restraints    Pre-Treatment Position in bed (P)   -LG     Post Treatment Position bed (P)   -LG     In Bed fowlers;encouraged to call for assist;call light within reach;patient within staff view (P)   -LG           User Key  (r) = Recorded By, (t) = Taken By, (c) = Cosigned By    Initials Name Provider Type    LG Daija Mantilla, PT Student PT Student               Outcome Measures     Row Name 04/05/23 1200          How much help from another person do you currently need...    Turning from your back to your side while in flat bed without using bedrails? 4 (P)   -LG     Moving from lying on back to sitting on the side of a flat bed without bedrails? 4 (P)   -LG     Moving to and from a bed to a chair (including a wheelchair)? 4 (P)   -LG     Standing up from a chair using your arms (e.g., wheelchair, bedside chair)? 4 (P)   -LG     Climbing 3-5 steps with a railing? 4 (P)   -LG     To walk in hospital room? 4 (P)   -LG     AM-PAC 6 Clicks Score (PT) 24 (P)   -LG     Highest  level of mobility 8 --> Walked 250 feet or more (P)   -LG     Row Name 04/05/23 1200          Functional Assessment    Outcome Measure Options AM-PAC 6 Clicks Basic Mobility (PT) (P)   -           User Key  (r) = Recorded By, (t) = Taken By, (c) = Cosigned By    Initials Name Provider Type    LG Daija Mantilla, PT Student PT Student              Physical Therapy Education     Title: PT OT SLP Therapies (In Progress)     Topic: Physical Therapy (In Progress)     Point: Mobility training (Done)     Learning Progress Summary           Patient Acceptance, E, VU by  at 4/5/2023 1200    Comment: Importance of mobility. PT encouraged pt to continue mobility with nursing during inpatient stay                   Point: Home exercise program (Not Started)     Learner Progress:  Not documented in this visit.          Point: Body mechanics (Not Started)     Learner Progress:  Not documented in this visit.          Point: Precautions (Not Started)     Learner Progress:  Not documented in this visit.                      User Key     Initials Effective Dates Name Provider Type Discipline     12/29/22 -  Daija Mantilla, PT Student PT Student PT              PT Recommendation and Plan     Plan of Care Reviewed With: (P) patient  Progress: (P) no change  Outcome Evaluation: (P) Pt was agreeable and participated in IE this date . Pt was supine in the bed upon PT entry into the room. Pt reports he lives alone in a two story home w/ 12 steps inside the home.Pt reports he is independent w/ ADL's, household and community mobility at baseline. Pt's BP was taken in the supine position and was 98/65. Pt performed supine to sit w/ Mod I and HOB elevated. Pt sat EOB unsupported ~5 minutes independently displaying good static sitting balance. Pt's BP was reassessed in sitting and was 114/67. Pt performed sit to stand independently w/ gait belt. Pt's BP was reassessed while standing and was 123/80. Pt ambulated 230' w/ SBA and gait  belt. Pt is currently at baseline for mobility and has no identified skilled PT needs at this time. PT encouraged pt to continue mobility w/ nursing during his inpatient stay in order to prevent muscle weakness secondary to being in the bed. PT will sign off at this time.     Time Calculation:    PT Charges     Row Name 04/05/23 1201             Time Calculation    Start Time 1105 (P)   -LG      PT Received On 04/05/23 (P)   -LG         Untimed Charges    PT Eval/Re-eval Minutes 40 (P)   -LG         Total Minutes    Untimed Charges Total Minutes 40 (P)   -LG       Total Minutes 40 (P)   -LG            User Key  (r) = Recorded By, (t) = Taken By, (c) = Cosigned By    Initials Name Provider Type    LG Daija Mantilla, PT Student PT Student              Therapy Charges for Today     Code Description Service Date Service Provider Modifiers Qty    01848638152 HC PT EVAL LOW COMPLEXITY 3 4/5/2023 Daija Mantilla, PT Student GP 1          PT G-Codes  Outcome Measure Options: (P) AM-PAC 6 Clicks Basic Mobility (PT)  AM-PAC 6 Clicks Score (PT): (P) 24    PT Discharge Summary  Anticipated Discharge Disposition (PT): (P) home    Daija Mantilla, PT Student  4/5/2023

## 2023-04-05 NOTE — H&P
"  [facility] HOSPITALIST   HISTORY AND PHYSICAL      Name:  Miller Em   Age:  62 y.o.  Sex:  male  :  1960  MRN:  [id number]   Visit Number:  [id number]  Admission Date:  2023  Date Of Service:  23  Primary Care Physician:  Praful Rapp MD    Chief Complaint:     Dizziness, generalized weakness    History Of Presenting Illness:      Patient is a 62 y.o. male with a past medical history of diabetes mellitus type 2, hyperlipidemia, hypertension, obesity, sleep apnea, thyroid disease who presents to the emergency department complaining of hyperglycemia, hypotension and dizziness sent from PCPs office.  Patient is a non-insulin-dependent diabetic only on metformin comes in for evaluation after being seen at his PCPs office prior to arrival.  Patient Was found to be hypotensive in his PCPs office and glucose read \"high\".  Patient reports that he has been feeling weak all over recently and has been getting dizzy more frequently over the past few days. Patient denies any complaints of headache, chest pain, shortness breath, abdominal pain, nausea vomiting or diarrhea.     On ER evaluation, patient was initially soft on his blood pressure with BP of 80s over 50s but has responded to IV fluids and improved to 120s over 70s otherwise vitals were stable and was afebrile. His work-up showed a blood gases with a pH of 7.295/PCO2 32/PO2 81/HCO3 15.8/saturation 96% on room air.  His HS Troponin was 57, glucose was 728, sodium 125, potassium 5.9, CO2 14.9, anion gap 20.1, creatinine 2.65 (baseline creatinine of 1.3) BUN 59, hemoglobin A1c of 17.3, lipase 450, lactate 2.2, platelets 137 chest x-ray chronic changes with no acute cardiopulmonary process otherwise per my read.  CT abdomen and pelvis without contrast with no acute disease.  Patient received IV fluid boluses, calcium gluconate and started on insulin per Glucomander protocol while in the ER.  Hospitalist consulted for admission, " further evaluation and treatment.    Review Of Systems:    All systems were reviewed and negative except as mentioned in history of presenting illness, assessment and plan.    Past Medical History: Patient  has a past medical history of Arthritis, Benign prostatic hyperplasia, Cataract, Diabetes, Diabetes mellitus, Gout, Headache, migraine, Headache, tension-type, Hyperlipidemia, Hypertension, Obesity, Sleep apnea, and Thyroid condition.    Past Surgical History: Patient  has a past surgical history that includes Eye surgery; Knee arthroscopy; and Colonoscopy.    Social History: Patient  reports that he quit smoking about 15 years ago. His smoking use included cigarettes. He has a 1.25 pack-year smoking history. He has never used smokeless tobacco. He reports current alcohol use of about 6.0 standard drinks per week. He reports that he does not use drugs.    Family History:  Patient's family history has been reviewed and found to be noncontributory.     Allergies:      Floxin [ofloxacin] and Lisinopril    Home Medications:    Prior to Admission Medications     Prescriptions Last Dose Informant Patient Reported? Taking?    Accu-Chek FastClix Lancets misc   No No    TEST DAILY FOR DM E11.9    allopurinol (Zyloprim) 300 MG tablet   No No    Take 1 tablet by mouth Daily.    amoxicillin (AMOXIL) 500 MG capsule   No No    Take 2 capsules by mouth 2 (Two) Times a Day.    aspirin 81 MG EC tablet   No No    Take 1 tablet by mouth Daily.    atorvastatin (LIPITOR) 20 MG tablet   No No    TAKE 1 TABLET BY MOUTH ONCE DAILY AT NIGHT    Cholecalciferol (Vitamin D3) 1.25 MG (26826 UT) capsule   No No    Take 1 capsule by mouth Every 7 (Seven) Days.    doxazosin (CARDURA) 4 MG tablet   No No    Take 1 tablet by mouth Daily.    fluticasone (Flonase) 50 MCG/ACT nasal spray   No No    2 sprays into the nostril(s) as directed by provider Daily.    glucose blood test strip   No No    Patient tests blood sugar one time daily.  Diagnosis  code E11.9    hydroCHLOROthiazide (HYDRODIURIL) 12.5 MG tablet   No No    TAKE 1 TABLET BY MOUTH ONCE DAILY . APPOINTMENT REQUIRED FOR FUTURE REFILLS    latanoprost (XALATAN) 0.005 % ophthalmic solution   Yes No    INSTILL 1 DROP INTO EACH EYE AT BEDTIME    levothyroxine (Euthyrox) 150 MCG tablet   No No    Take 1 tablet by mouth Daily.    losartan (COZAAR) 100 MG tablet   No No    Take 1 tablet by mouth once daily    metFORMIN (GLUCOPHAGE) 500 MG tablet   No No    Take 1 tablet by mouth once daily with breakfast    metoprolol succinate XL (TOPROL-XL) 50 MG 24 hr tablet   No No    Take 1 tablet by mouth once daily        ED Medications:    Medications   sodium chloride 0.9 % flush 10 mL (has no administration in time range)   sodium chloride 0.9 % flush 10 mL (10 mL Intravenous Given 4/4/23 2001)   sodium chloride 0.9 % flush 10 mL (has no administration in time range)   sodium chloride 0.9 % infusion 40 mL (has no administration in time range)   dextrose (GLUTOSE) oral gel 15 g (has no administration in time range)   dextrose (D50W) (25 g/50 mL) IV injection 10-50 mL (has no administration in time range)   glucagon (GLUCAGEN) injection 1 mg (has no administration in time range)   sodium chloride 0.9 % bolus (1,000 mL/hr Intravenous New Bag 4/4/23 1938)   sodium chloride 0.9 % infusion (has no administration in time range)   sodium chloride 0.9 % with KCl 20 mEq/L infusion (has no administration in time range)   sodium chloride 0.9 % with KCl 40 mEq/L infusion (has no administration in time range)   dextrose 5 % and sodium chloride 0.9 % infusion (has no administration in time range)   dextrose 5 % and sodium chloride 0.9 % with KCl 20 mEq/L infusion (has no administration in time range)   dextrose 5 % and sodium chloride 0.9 % with KCl 40 mEq/L infusion (has no administration in time range)   sodium chloride 0.45 % infusion (has no administration in time range)   sodium chloride 0.45 % with KCl 20 mEq/L infusion  "(has no administration in time range)   sodium chloride 0.45 % 1,000 mL with potassium chloride 40 mEq infusion (has no administration in time range)   dextrose 5 % and sodium chloride 0.45 % infusion (has no administration in time range)   dextrose 5 % and sodium chloride 0.45 % with KCl 20 mEq/L infusion (has no administration in time range)   dextrose 5 % and sodium chloride 0.45 % with KCl 40 mEq/L infusion (has no administration in time range)   insulin regular 1 unit/mL in 0.9% sodium chloride (Glucommander) (5.4 Units/hr Intravenous New Bag 4/4/23 1940)   Potassium Replacement - Follow Nurse / BPA Driven Protocol (has no administration in time range)   Magnesium Standard Dose Replacement - Follow Nurse / BPA Driven Protocol (has no administration in time range)   Phosphorus Replacement - Follow Nurse / BPA Driven Protocol (has no administration in time range)   sodium chloride 0.9 % bolus 1,000 mL (1,000 mL Intravenous New Bag 4/4/23 1816)   sodium chloride 0.9 % bolus 1,000 mL (1,000 mL Intravenous New Bag 4/4/23 1938)   calcium gluconate injection 1 g (1 g Intravenous Given 4/4/23 1935)     Vital Signs:  Temp:  [98.5 °F (36.9 °C)] 98.5 °F (36.9 °C)  Heart Rate:  [] 89  Resp:  [18] 18  BP: (62-97)/(48-61) 97/61        04/04/23  1710   Weight: 130 kg (286 lb)     Body mass index is 41.04 kg/m².    Physical Exam:     Most recent vital Signs: BP 97/61   Pulse 89   Temp 98.5 °F (36.9 °C)   Resp 18   Ht 177.8 cm (70\")   Wt 130 kg (286 lb)   SpO2 97%   BMI 41.04 kg/m²     Physical Exam  Vitals and nursing note reviewed.   Constitutional:       General: He is not in acute distress.     Appearance: He is obese. He is ill-appearing.   HENT:      Head: Normocephalic and atraumatic.      Right Ear: External ear normal.      Left Ear: External ear normal.      Nose: Nose normal.      Mouth/Throat:      Mouth: Mucous membranes are dry.   Eyes:      Extraocular Movements: Extraocular movements intact.      " Conjunctiva/sclera: Conjunctivae normal.      Pupils: Pupils are equal, round, and reactive to light.   Cardiovascular:      Rate and Rhythm: Normal rate and regular rhythm.      Pulses: Normal pulses.      Heart sounds: Normal heart sounds.   Pulmonary:      Effort: Pulmonary effort is normal.      Breath sounds: Normal breath sounds. No wheezing or rhonchi.   Abdominal:      General: Bowel sounds are normal. There is no distension.      Palpations: Abdomen is soft.      Tenderness: There is no abdominal tenderness.   Musculoskeletal:         General: Normal range of motion.      Cervical back: Normal range of motion and neck supple.      Right lower leg: No edema.      Left lower leg: No edema.   Skin:     General: Skin is warm and dry.      Findings: No rash.   Neurological:      General: No focal deficit present.      Mental Status: He is alert and oriented to person, place, and time. Mental status is at baseline.   Psychiatric:         Mood and Affect: Mood normal.         Behavior: Behavior normal.         Laboratory data:    I have reviewed the labs done in the emergency room.    Results from last 7 days   Lab Units 04/04/23  1749   SODIUM mmol/L 125*   POTASSIUM mmol/L 5.9*   CHLORIDE mmol/L 90*   CO2 mmol/L 14.9*   BUN mg/dL 58*   CREATININE mg/dL 2.65*   CALCIUM mg/dL 10.9*   BILIRUBIN mg/dL 0.4   ALK PHOS U/L 83   ALT (SGPT) U/L 27   AST (SGOT) U/L 19   GLUCOSE mg/dL 728*     Results from last 7 days   Lab Units 04/04/23  1749   WBC 10*3/mm3 5.64   HEMOGLOBIN g/dL 13.7   HEMATOCRIT % 43.3   PLATELETS 10*3/mm3 137*         Results from last 7 days   Lab Units 04/04/23  1749   HSTROP T ng/L 57*             Results from last 7 days   Lab Units 04/04/23  1749   LIPASE U/L 450*     Results from last 7 days   Lab Units 04/04/23  1757   PH, ARTERIAL pH units 7.295*   PO2 ART mm Hg 81.8   PCO2, ARTERIAL mm Hg 32.4*   HCO3 ART mmol/L 15.8*           Invalid input(s): USDES,  BLOODU, NITRITITE, BACT, EP    Pain  Management Panel    There is no flowsheet data to display.         EKG:      Sinus bradycardia, heart rate 54, nonspecific ST/T wave changes.    Radiology:    CT Abdomen Pelvis Without Contrast    Result Date: 4/4/2023  FINAL REPORT TECHNIQUE: Routine axial images through the abdomen and pelvis were obtained. CLINICAL HISTORY: elevated lipase, gabby FINDINGS: Abdomen:  The gallbladder is normal.  There are incidentally noted cysts in the left lobe of the liver.  There is an incidental left renal cyst.  There is a small right adrenal nodule likely an adenoma.  There is no evidence of acute pancreatitis.  The solid abdominal organs and ureters are otherwise unremarkable.  There is colonic diverticulosis The GI tract is otherwise unremarkable, including the appendix. Pelvis: The urinary bladder is normal. There is no pelvic or abdominal ascites, adenopathy or acute osseous abnormality.     No acute disease. Authenticated and Electronically Signed by Khanh Echeverria M.D. on 04/04/2023 08:51:40 PM      Assessment:    DKA, POA  Acute kidney injury, POA  Elevated troponin, POA  Hypotension, unspecified, POA  Diabetes mellitus type 2, uncontrolled  Hyperlipidemia  Hypertension  Obesity, BMI 42  Sleep apnea  Thyroid disease     Plan:    Patient is admitted to ICU for further treatment.    DKA  -Glucomander protocol.  -Electrolytes replacement and IV fluids per Glucomander protocol.  -Continue to monitor with labs every 4 hours.  -No signs of infection.  -Hemoglobin A1c 17.3  -Diabetes education    GABBY   -In the settings of DKA  -No obstruction on CT abdomen and pelvis  -Avoid nephrotoxic drugs, hold HCTZ and losartan  -Monitor kidney function  -Continue IV fluids    Hypotension  -Appears to be due to volume depletion  -Responding to IV fluids  -Hold antihypertensives except for metoprolol  -Continue to monitor    Elevated troponin  -Likely demand ischemia in the settings of GABBY and DKA  -No chest pain, low suspicion for  ACS  -Continue to monitor and recheck in a.m.    Obesity, BMI 42  Sleep apnea  -Complicates all aspects of care    Further orders as indicated per clinical course.    Risk Assessment: Moderate to high  DVT Prophylaxis: Heparin subcu prophylaxis (benefit> risk)  Code Status: Full  Diet: N.p.o.    Advance Care Planning   ACP discussion was held with the patient during this visit. Patient does not have an advance directive, information provided.       Lakeisha Valdez MD  04/04/23  21:01 EDT    Dictated utilizing Dragon dictation.

## 2023-04-05 NOTE — PLAN OF CARE
Goal Outcome Evaluation:              Outcome Evaluation: Pt without N/V since arrival to icu.  VSS.  No complaints overnight.  Continuing with timed labs

## 2023-04-05 NOTE — CONSULTS
"Adult Nutrition  Assessment/PES    Patient Name:  Miller Em  YOB: 1960  MRN: [id number]  Admit Date:  4/4/2023    Assessment Date:  4/5/2023    Comments:    Consulted for diet education. Pt with PMHx significant for T2DM, HLD, HTN, thyroid disease presented to [facility] yesterday 4/4 in DKA. A1c 17.3%. CDE consulted. Diet advanced to consistent carbohydrate this afternoon 4/5, No PO intakes yet reported. RD to monitor for PO intakes to determine need for ONS. Will provide DM diet education s/p d/c from ICU or prior to d/c from [facility]. Will monitor for updates and F/U. Available PRN.      Reason for Assessment     Row Name 04/05/23 1612          Reason for Assessment    Reason For Assessment nurse/nurse practitioner consult;diagnosis/disease state     Diagnosis diabetes diagnosis/complications                  Labs/Tests/Procedures/Meds     Row Name 04/05/23 1612          Labs/Procedures/Meds    Lab Results Reviewed reviewed, pertinent     Lab Results Comments Low: Phos; High: A1c, Cl-, BUN, Cr, Mg++        Medications    Pertinent Medications Reviewed reviewed, pertinent     Pertinent Medications Comments lipitor, insulin, synthroid, metoprolol, docusate, NaCl                Physical Findings     Row Name 04/05/23 1613          Physical Findings    Overall Physical Appearance obese per BMI 42.32, Christiano score 17                Estimated/Assessed Needs - Anthropometrics     Row Name 04/05/23 1614          Anthropometrics    Height for Calculation 1.753 m (5' 9\")     Weight for Calculation 86 kg (189 lb 9.5 oz)  adjusted        Estimated/Assessed Needs    Additional Documentation Fluid Requirements (Group);KCAL/KG (Group);Estimated Calorie Needs (Group);Protein Requirements (Group)        Estimated Calorie Needs    Estimated Calorie Requirement (kcal/day) 2,150-2,580     Estimated Calorie Need Method kcal/kg        KCAL/KG    KCAL/KG 25 Kcal/Kg (kcal);30 Kcal/Kg (kcal)     25 Kcal/Kg (kcal) 2150     30 " Kcal/Kg (kcal) 2580        Protein Requirements    Weight Used For Protein Calculations 130 kg (286 lb 9.6 oz)  current     Est Protein Requirement Amount (gms/kg) 0.8 gm protein     Estimated Protein Requirements (gms/day) 104        Fluid Requirements    Fluid Requirements (mL/day) 2150  1 mL/kcal     RDA Method (mL) 2150                Nutrition Prescription Ordered     Row Name 04/05/23 1615          Nutrition Prescription PO    Current PO Diet Regular     Fluid Consistency Thin     Common Modifiers Consistent Carbohydrate                Evaluation of Received Nutrient/Fluid Intake     Row Name 04/05/23 1616          PO Evaluation    Number of Days PO Intake Evaluated Insufficient Data                   Problem/Interventions:   Problem 1     Row Name 04/05/23 1616          Nutrition Diagnoses Problem 1    Problem 1 Altered Nutrition Related to Labs     Etiology (related to) Medical Diagnosis     Signs/Symptoms (evidenced by) Biochemical     Specific Labs Noted HgbA1C;Glucose                Problem 2     Row Name 04/05/23 1616          Nutrition Diagnoses Problem 2    Problem 2 Knowledge Deficit     Etiology (related to) Medical Diagnosis;MNT for Treatment/Condition     Signs/Symptoms (evidenced by) Report/Observation;Biochemical  consult for diet education     Specific Labs Noted HgbA1C                    Intervention Goal     Row Name 04/05/23 1617          Intervention Goal    General Maintain nutrition;Meet nutritional needs for age/condition;Improved nutrition related lab(s);Provide information regarding MNT for treatment/condition;Disease management/therapy     PO Establish PO;Tolerate PO;Meet estimated needs     Weight Maintain weight                Nutrition Intervention     Row Name 04/05/23 1617          Nutrition Intervention    RD/Tech Action Follow Tx progress;Encourage intake;Care plan reviewd                Nutrition Prescription     Row Name 04/05/23 1617          Other Orders    Obtain Weight  Daily     Obtain Weight Ordered? No, recommended     Supplement Vitamin mineral supplement     Supplement Ordered? No, recommended     Labs K+;Mg++;Phos;Na+     Labs Ordered? No, recommended                Education/Evaluation     Row Name 04/05/23 1617          Education    Education Education not appropriate at this time     Please explain Defer until post ICU        Monitor/Evaluation    Monitor Per protocol;PO intake;Pertinent labs;Symptoms;Skin status;Weight                 Electronically signed by:  Licha Martinez RD  04/05/23 16:18 EDT

## 2023-04-05 NOTE — PLAN OF CARE
Goal Outcome Evaluation:  Plan of Care Reviewed With: (P) patient        Progress: (P) no change  Outcome Evaluation: (P) Pt was agreeable and participated in IE this date . Pt was supine in the bed upon PT entry into the room. Pt reports he lives alone in a two story home w/ 12 steps inside the home.Pt reports he is independent w/ ADL's, household and community mobility at baseline. Pt's BP was taken in the supine position and was 98/65. Pt performed supine to sit w/ Mod I and HOB elevated. Pt sat EOB unsupported ~5 minutes independently displaying good static sitting balance. Pt's BP was reassessed in sitting and was 114/67. Pt performed sit to stand independently w/ gait belt. Pt's BP was reassessed while standing and was 123/80. Pt ambulated 230' w/ SBA and gait belt. Pt is currently at baseline for mobility and has no identified skilled PT needs at this time. PT encouraged pt to continue mobility w/ nursing during his inpatient stay in order to prevent muscle weakness secondary to being in the bed. PT will sign off at this time.

## 2023-04-05 NOTE — CASE MANAGEMENT/SOCIAL WORK
Discharge Planning Assessment  [facility]     Patient Name: Miller Em  MRN: [id number]  Today's Date: 4/5/2023    Admit Date: 4/4/2023    Plan: Pt plans to return home at discharge. Will need glucometer supplies but unable to recall name of glucometer during rounds. DE consult pending.   Discharge Needs Assessment     Row Name 04/05/23 1527       Living Environment    People in Home alone    Current Living Arrangements home    Potentially Unsafe Housing Conditions unable to assess    Primary Care Provided by self    Provides Primary Care For no one    Family Caregiver if Needed other (see comments)  unknown    Able to Return to Prior Arrangements yes       Resource/Environmental Concerns    Resource/Environmental Concerns none    Transportation Concerns none       Food Insecurity    Within the past 12 months, you worried that your food would run out before you got the money to buy more. Never true    Within the past 12 months, the food you bought just didn't last and you didn't have money to get more. Never true       Transition Planning    Patient/Family Anticipates Transition to home    Patient/Family Anticipated Services at Transition none    Transportation Anticipated --  TBD       Discharge Needs Assessment    Readmission Within the Last 30 Days no previous admission in last 30 days    Equipment Currently Used at Home glucometer    Concerns to be Addressed other (see comments)  needs glucometer supplies. Unable to recall name of glucometer. Agrees to think about it tonight and Cm will f/u tomorrow. DE pending    Anticipated Changes Related to Illness none    Equipment Needed After Discharge none    Current Discharge Risk lives alone               Discharge Plan     Row Name 04/05/23 8888       Plan    Plan Pt plans to return home at discharge. Will need glucometer supplies but unable to recall name of glucometer during rounds. DE consult pending.    Plan Comments Cm spoke briefly with pt at [facility] to  discuss dcp. Pt visibly agitated and pointing to his throat while remaining silent. Eventually states he has answered multiple questions multiple times and his throat is extemely painful. Cm offered to come back at later rajani. He was agreeable to confirming address and phone number with affirmative head nod. Pt identiy confirmed by compating pt id bracelet and face sheet. Pt lives alone, is independent at baseline and has reliable transportation. Pt Will need glucometer supplies but unable to recall name of glucometer during rounds. Cm agrees to follow up tomorrow. CM contact info left at bedside. Plans to return home at [place].              Continued Care and Services - Admitted Since 4/4/2023    Coordination has not been started for this encounter.          Demographic Summary     Row Name 04/05/23 1523       General Information    Admission Type inpatient    Arrived From emergency department    Referral Source admission list    Reason for Consult discharge planning    Preferred Language English       Contact Information    Permission Granted to Share Info With family/designee    Contact Information Obtained for     Contact Information Comments Myron [phone]               Functional Status     Row Name 04/05/23 1526       Physical Activity    On average, how many days per week do you engage in moderate to strenuous exercise (like a brisk walk)? 0 days    On average, how many minutes do you engage in exercise at this level? 0 min    Number of minutes of exercise per week 0       Assessment of Health Literacy    How often do you have someone help you read hospital materials? Sometimes    How often do you have problems learning about your medical condition because of difficulty understanding written information? Sometimes    How often do you have a problem understanding what is told to you about your medical condition? Sometimes    How confident are you filling out medical forms by yourself? Quite  a bit    Health Literacy Moderate       Functional Status, IADL    Medications independent    Meal Preparation independent    Housekeeping independent    Laundry independent    Shopping independent       Employment/    Employment Status , previous service           Current or Previous  Service --  unsure if he has VA benefits    [employer]               Psychosocial     Row Name 04/05/23 1527       Developmental Stage (Evgeny's)    Developmental Stage Stage 7 (35-65 years/Middle Adulthood) Generativity vs. Stagnation               Abuse/Neglect    No documentation.                Legal    No documentation.                Substance Abuse    No documentation.                Patient Forms    No documentation.                   Maria De Jesus Au, APRN

## 2023-04-05 NOTE — PAYOR COMM NOTE
"Miller Sebastian (62 y.o. Male)     Date of Birth   1960    Social Security Number       Address   [address] [id number]    Home Phone   [phone]    MRN   [id number]       Buddhist   Cheondoism    Marital Status   Single                            Admission Date   4/4/23    Admission Type   Emergency    Admitting Provider   Lakeisha Valdez MD    Attending Provider   Robbin Chappell DO    Department, Room/Bed   [facility] INTENSIVE [facility], I03/1       Discharge Date       Discharge Disposition       Discharge Destination                               Attending Provider: Robbin Chappell DO    Allergies: Floxin [Ofloxacin], Lisinopril    Isolation: None   Infection: None   Code Status: CPR    Ht: 175.3 cm (69\")   Wt: 130 kg (286 lb 9.6 oz)    Admission Cmt: None   Principal Problem: Diabetic ketoacidosis without coma associated with diabetes mellitus due to underlying condition [E08.10]                 Active Insurance as of 4/4/2023     Primary Coverage     Payor Plan Insurance Group Employer/Plan Group    HUMANA MEDICARE REPLACEMENT HUMANA MEDICARE REPLACEMENT [id number]     Payor Plan Address Payor Plan Phone Number Payor Plan Fax Number Effective Dates    [address] [address] [phone]  4/1/2023 - None Entered    [facility] [id number]       Subscriber Name Subscriber Birth Date Member ID       MILLER SEBASTIAN 1960 [id number]           Secondary Coverage     Payor Plan Insurance Group Employer/Plan Group    [facility] [id number]     Payor Plan Address Payor Plan Phone Number Payor Plan Fax Number Effective Dates    [address] [address]   1/1/2021 - None Entered    [facility] [phone]       Subscriber Name Subscriber Birth Date Member ID       MILLER SEBASTIAN 1960 [id number]                 Emergency Contacts      (Rel.) Home Phone Work Phone Mobile Phone    Myron Figueroa (Other) [phone] -- --             " "  History & Physical      Lakeisha Valdez MD at 23 2101            [facility]   HISTORY AND PHYSICAL      Name:  Miller Em   Age:  62 y.o.  Sex:  male  :  1960  MRN:  [id number]   Visit Number:  [id number]  Admission Date:  2023  Date Of Service:  23  Primary Care Physician:  Praful Rapp MD    Chief Complaint:     Dizziness, generalized weakness    History Of Presenting Illness:      Patient is a 62 y.o. male with a past medical history of diabetes mellitus type 2, hyperlipidemia, hypertension, obesity, sleep apnea, thyroid disease who presents to the emergency department complaining of hyperglycemia, hypotension and dizziness sent from PCPs office.  Patient is a non-insulin-dependent diabetic only on metformin comes in for evaluation after being seen at his PCPs office prior to arrival.  Patient Was found to be hypotensive in his PCPs office and glucose read \"high\".  Patient reports that he has been feeling weak all over recently and has been getting dizzy more frequently over the past few days. Patient denies any complaints of headache, chest pain, shortness breath, abdominal pain, nausea vomiting or diarrhea.     On ER evaluation, patient was initially soft on his blood pressure with BP of 80s over 50s but has responded to IV fluids and improved to 120s over 70s otherwise vitals were stable and was afebrile. His work-up showed a blood gases with a pH of 7.295/PCO2 32/PO2 81/HCO3 15.8/saturation 96% on room air.  His HS Troponin was 57, glucose was 728, sodium 125, potassium 5.9, CO2 14.9, anion gap 20.1, creatinine 2.65 (baseline creatinine of 1.3) BUN 59, hemoglobin A1c of 17.3, lipase 450, lactate 2.2, platelets 137 chest x-ray chronic changes with no acute cardiopulmonary process otherwise per my read.  CT abdomen and pelvis without contrast with no acute disease.  Patient received IV fluid boluses, calcium gluconate and started on insulin per " Glucomander protocol while in the ER.  Hospitalist consulted for admission, further evaluation and treatment.    Review Of Systems:    All systems were reviewed and negative except as mentioned in history of presenting illness, assessment and plan.    Past Medical History: Patient  has a past medical history of Arthritis, Benign prostatic hyperplasia, Cataract, Diabetes, Diabetes mellitus, Gout, Headache, migraine, Headache, tension-type, Hyperlipidemia, Hypertension, Obesity, Sleep apnea, and Thyroid condition.    Past Surgical History: Patient  has a past surgical history that includes Eye surgery; Knee arthroscopy; and Colonoscopy.    Social History: Patient  reports that he quit smoking about 15 years ago. His smoking use included cigarettes. He has a 1.25 pack-year smoking history. He has never used smokeless tobacco. He reports current alcohol use of about 6.0 standard drinks per week. He reports that he does not use drugs.    Family History:  Patient's family history has been reviewed and found to be noncontributory.     Allergies:      Floxin [ofloxacin] and Lisinopril    Home Medications:    Prior to Admission Medications     Prescriptions Last Dose Informant Patient Reported? Taking?    Accu-Chek FastClix Lancets misc   No No    TEST DAILY FOR DM E11.9    allopurinol (Zyloprim) 300 MG tablet   No No    Take 1 tablet by mouth Daily.    amoxicillin (AMOXIL) 500 MG capsule   No No    Take 2 capsules by mouth 2 (Two) Times a Day.    aspirin 81 MG EC tablet   No No    Take 1 tablet by mouth Daily.    atorvastatin (LIPITOR) 20 MG tablet   No No    TAKE 1 TABLET BY MOUTH ONCE DAILY AT NIGHT    Cholecalciferol (Vitamin D3) 1.25 MG (46533 UT) capsule   No No    Take 1 capsule by mouth Every 7 (Seven) Days.    doxazosin (CARDURA) 4 MG tablet   No No    Take 1 tablet by mouth Daily.    fluticasone (Flonase) 50 MCG/ACT nasal spray   No No    2 sprays into the nostril(s) as directed by provider Daily.    glucose blood  test strip   No No    Patient tests blood sugar one time daily.  Diagnosis code E11.9    hydroCHLOROthiazide (HYDRODIURIL) 12.5 MG tablet   No No    TAKE 1 TABLET BY MOUTH ONCE DAILY . APPOINTMENT REQUIRED FOR FUTURE REFILLS    latanoprost (XALATAN) 0.005 % ophthalmic solution   Yes No    INSTILL 1 DROP INTO EACH EYE AT BEDTIME    levothyroxine (Euthyrox) 150 MCG tablet   No No    Take 1 tablet by mouth Daily.    losartan (COZAAR) 100 MG tablet   No No    Take 1 tablet by mouth once daily    metFORMIN (GLUCOPHAGE) 500 MG tablet   No No    Take 1 tablet by mouth once daily with breakfast    metoprolol succinate XL (TOPROL-XL) 50 MG 24 hr tablet   No No    Take 1 tablet by mouth once daily        ED Medications:    Medications   sodium chloride 0.9 % flush 10 mL (has no administration in time range)   sodium chloride 0.9 % flush 10 mL (10 mL Intravenous Given 4/4/23 2001)   sodium chloride 0.9 % flush 10 mL (has no administration in time range)   sodium chloride 0.9 % infusion 40 mL (has no administration in time range)   dextrose (GLUTOSE) oral gel 15 g (has no administration in time range)   dextrose (D50W) (25 g/50 mL) IV injection 10-50 mL (has no administration in time range)   glucagon (GLUCAGEN) injection 1 mg (has no administration in time range)   sodium chloride 0.9 % bolus (1,000 mL/hr Intravenous New Bag 4/4/23 1938)   sodium chloride 0.9 % infusion (has no administration in time range)   sodium chloride 0.9 % with KCl 20 mEq/L infusion (has no administration in time range)   sodium chloride 0.9 % with KCl 40 mEq/L infusion (has no administration in time range)   dextrose 5 % and sodium chloride 0.9 % infusion (has no administration in time range)   dextrose 5 % and sodium chloride 0.9 % with KCl 20 mEq/L infusion (has no administration in time range)   dextrose 5 % and sodium chloride 0.9 % with KCl 40 mEq/L infusion (has no administration in time range)   sodium chloride 0.45 % infusion (has no  "administration in time range)   sodium chloride 0.45 % with KCl 20 mEq/L infusion (has no administration in time range)   sodium chloride 0.45 % 1,000 mL with potassium chloride 40 mEq infusion (has no administration in time range)   dextrose 5 % and sodium chloride 0.45 % infusion (has no administration in time range)   dextrose 5 % and sodium chloride 0.45 % with KCl 20 mEq/L infusion (has no administration in time range)   dextrose 5 % and sodium chloride 0.45 % with KCl 40 mEq/L infusion (has no administration in time range)   insulin regular 1 unit/mL in 0.9% sodium chloride (Glucommander) (5.4 Units/hr Intravenous New Bag 4/4/23 1940)   Potassium Replacement - Follow Nurse / BPA Driven Protocol (has no administration in time range)   Magnesium Standard Dose Replacement - Follow Nurse / BPA Driven Protocol (has no administration in time range)   Phosphorus Replacement - Follow Nurse / BPA Driven Protocol (has no administration in time range)   sodium chloride 0.9 % bolus 1,000 mL (1,000 mL Intravenous New Bag 4/4/23 1816)   sodium chloride 0.9 % bolus 1,000 mL (1,000 mL Intravenous New Bag 4/4/23 1938)   calcium gluconate injection 1 g (1 g Intravenous Given 4/4/23 1935)     Vital Signs:  Temp:  [98.5 °F (36.9 °C)] 98.5 °F (36.9 °C)  Heart Rate:  [] 89  Resp:  [18] 18  BP: (62-97)/(48-61) 97/61        04/04/23  1710   Weight: 130 kg (286 lb)     Body mass index is 41.04 kg/m².    Physical Exam:     Most recent vital Signs: BP 97/61   Pulse 89   Temp 98.5 °F (36.9 °C)   Resp 18   Ht 177.8 cm (70\")   Wt 130 kg (286 lb)   SpO2 97%   BMI 41.04 kg/m²     Physical Exam  Vitals and nursing note reviewed.   Constitutional:       General: He is not in acute distress.     Appearance: He is obese. He is ill-appearing.   HENT:      Head: Normocephalic and atraumatic.      Right Ear: External ear normal.      Left Ear: External ear normal.      Nose: Nose normal.      Mouth/Throat:      Mouth: Mucous membranes " are dry.   Eyes:      Extraocular Movements: Extraocular movements intact.      Conjunctiva/sclera: Conjunctivae normal.      Pupils: Pupils are equal, round, and reactive to light.   Cardiovascular:      Rate and Rhythm: Normal rate and regular rhythm.      Pulses: Normal pulses.      Heart sounds: Normal heart sounds.   Pulmonary:      Effort: Pulmonary effort is normal.      Breath sounds: Normal breath sounds. No wheezing or rhonchi.   Abdominal:      General: Bowel sounds are normal. There is no distension.      Palpations: Abdomen is soft.      Tenderness: There is no abdominal tenderness.   Musculoskeletal:         General: Normal range of motion.      Cervical back: Normal range of motion and neck supple.      Right lower leg: No edema.      Left lower leg: No edema.   Skin:     General: Skin is warm and dry.      Findings: No rash.   Neurological:      General: No focal deficit present.      Mental Status: He is alert and oriented to person, place, and time. Mental status is at baseline.   Psychiatric:         Mood and Affect: Mood normal.         Behavior: Behavior normal.         Laboratory data:    I have reviewed the labs done in the emergency room.    Results from last 7 days   Lab Units 04/04/23  1749   SODIUM mmol/L 125*   POTASSIUM mmol/L 5.9*   CHLORIDE mmol/L 90*   CO2 mmol/L 14.9*   BUN mg/dL 58*   CREATININE mg/dL 2.65*   CALCIUM mg/dL 10.9*   BILIRUBIN mg/dL 0.4   ALK PHOS U/L 83   ALT (SGPT) U/L 27   AST (SGOT) U/L 19   GLUCOSE mg/dL 728*     Results from last 7 days   Lab Units 04/04/23  1749   WBC 10*3/mm3 5.64   HEMOGLOBIN g/dL 13.7   HEMATOCRIT % 43.3   PLATELETS 10*3/mm3 137*         Results from last 7 days   Lab Units 04/04/23  1749   HSTROP T ng/L 57*             Results from last 7 days   Lab Units 04/04/23  1749   LIPASE U/L 450*     Results from last 7 days   Lab Units 04/04/23  1757   PH, ARTERIAL pH units 7.295*   PO2 ART mm Hg 81.8   PCO2, ARTERIAL mm Hg 32.4*   HCO3 ART mmol/L  15.8*           Invalid input(s): USDES,  BLOODU, NITRITITE, BACT, EP    Pain Management Panel    There is no flowsheet data to display.         EKG:      Sinus bradycardia, heart rate 54, nonspecific ST/T wave changes.    Radiology:    CT Abdomen Pelvis Without Contrast    Result Date: 4/4/2023  FINAL REPORT TECHNIQUE: Routine axial images through the abdomen and pelvis were obtained. CLINICAL HISTORY: elevated lipase, gabby FINDINGS: Abdomen:  The gallbladder is normal.  There are incidentally noted cysts in the left lobe of the liver.  There is an incidental left renal cyst.  There is a small right adrenal nodule likely an adenoma.  There is no evidence of acute pancreatitis.  The solid abdominal organs and ureters are otherwise unremarkable.  There is colonic diverticulosis The GI tract is otherwise unremarkable, including the appendix. Pelvis: The urinary bladder is normal. There is no pelvic or abdominal ascites, adenopathy or acute osseous abnormality.     No acute disease. Authenticated and Electronically Signed by Khanh Echeverria M.D. on 04/04/2023 08:51:40 PM      Assessment:    DKA, POA  Acute kidney injury, POA  Elevated troponin, POA  Hypotension, unspecified, POA  Diabetes mellitus type 2, uncontrolled  Hyperlipidemia  Hypertension  Obesity, BMI 42  Sleep apnea  Thyroid disease     Plan:    Patient is admitted to ICU for further treatment.    DKA  -Glucomander protocol.  -Electrolytes replacement and IV fluids per Glucomander protocol.  -Continue to monitor with labs every 4 hours.  -No signs of infection.  -Hemoglobin A1c 17.3  -Diabetes education    GABBY   -In the settings of DKA  -No obstruction on CT abdomen and pelvis  -Avoid nephrotoxic drugs, hold HCTZ and losartan  -Monitor kidney function  -Continue IV fluids    Hypotension  -Appears to be due to volume depletion  -Responding to IV fluids  -Hold antihypertensives except for metoprolol  -Continue to monitor    Elevated troponin  -Likely demand ischemia  "in the settings of GABBY and DKA  -No chest pain, low suspicion for ACS  -Continue to monitor and recheck in a.m.    Obesity, BMI 42  Sleep apnea  -Complicates all aspects of care    Further orders as indicated per clinical course.    Risk Assessment: Moderate to high  DVT Prophylaxis: Heparin subcu prophylaxis (benefit> risk)  Code Status: Full  Diet: N.p.o.    Advance Care Planning   ACP discussion was held with the patient during this visit. Patient does not have an advance directive, information provided.      Lakeisha Valdez MD  04/04/23  21:01 EDT    Dictated utilizing Dragon dictation.    Electronically signed by Lakeisha Valdez MD at 04/05/23 0657          Emergency Department Notes      Vita Ng PCT at 04/04/23 1720        Blood glucose level high out of reportable range     Electronically signed by Vita Ng PCT at 04/04/23 1721     Lydia Lan at 04/04/23 1848     Summary:hospitalist             At this time,  contacted and transferred to BERNARDO Teixeira.      Electronically signed by Lydia Lan at 04/04/23 1849     Puneet Montilla PA-C at 04/04/23 1913          Subjective  History of Present Illness:    Chief Complaint:   Chief Complaint   Patient presents with   • Hyperglycemia   • Hypotension      History of Present Illness: Miller Em is a 62 y.o. male who presents to the emergency department complaining of hyperglycemia, hypotension and dizziness sent from PCPs office.  Patient is a non-insulin-dependent diabetic only on metformin comes in for evaluation after being seen at his PCPs office prior to arrival.  Was found to be hypotensive in his PCPs office and glucose read \"high\".  Patient denies any complaints of headache, chest pain, shortness breath, abdominal pain, nausea vomiting or diarrhea.  He states he overall just does not feel well but nothing specific.  Onset: Dizziness over the past week  Duration: Ongoing  Exacerbating / Alleviating factors: Worse " with change in position, patient states when he goes from lying to standing he feels as though he is going to pass out  Associated symptoms: None      Nurses Notes reviewed and agree, including vitals, allergies, social history and prior medical history.     Review of Systems   Constitutional: Negative.    HENT: Negative.    Eyes: Negative.    Respiratory: Negative.  Negative for cough and shortness of breath.    Cardiovascular: Negative.  Negative for chest pain.   Gastrointestinal: Negative.  Negative for abdominal pain, diarrhea, nausea and vomiting.   Endocrine: Positive for polydipsia.   Genitourinary: Negative.    Musculoskeletal: Negative.    Skin: Negative.    Allergic/Immunologic: Negative.    Neurological: Positive for dizziness. Negative for headaches.        Near syncope   Psychiatric/Behavioral: Negative.    All other systems reviewed and are negative.      Past Medical History:   Diagnosis Date   • Arthritis    • Benign prostatic hyperplasia    • Cataract    • Diabetes    • Diabetes mellitus    • Gout    • Headache, migraine    • Headache, tension-type    • Hyperlipidemia    • Hypertension    • Obesity    • Sleep apnea    • Thyroid condition        Allergies:    Floxin [ofloxacin] and Lisinopril      Past Surgical History:   Procedure Laterality Date   • COLONOSCOPY     • EYE SURGERY      Cataract   • KNEE ARTHROSCOPY           Social History     Socioeconomic History   • Marital status: Single   Tobacco Use   • Smoking status: Former     Packs/day: 0.25     Years: 5.00     Pack years: 1.25     Types: Cigarettes     Quit date: 1/1/2008     Years since quitting: 15.2   • Smokeless tobacco: Never   • Tobacco comments:     on and off x 10-15 years 1 pack per week   Vaping Use   • Vaping Use: Never used   Substance and Sexual Activity   • Alcohol use: Yes     Alcohol/week: 6.0 standard drinks     Types: 3 Cans of beer, 3 Shots of liquor per week     Comment: 1-2 wkly   • Drug use: No   • Sexual activity:  "Yes     Partners: Female     Birth control/protection: Condom         Family History   Problem Relation Age of Onset   • Stroke Other    • Hyperlipidemia Other    • Stroke Father    • Hyperlipidemia Brother        Objective  Physical Exam:  BP 97/61   Pulse 89   Temp 98.5 °F (36.9 °C)   Resp 18   Ht 177.8 cm (70\")   Wt 130 kg (286 lb)   SpO2 97%   BMI 41.04 kg/m²      Physical Exam  Vitals and nursing note reviewed.   Constitutional:       General: He is not in acute distress.     Appearance: Normal appearance. He is obese. He is not ill-appearing, toxic-appearing or diaphoretic.   HENT:      Head: Normocephalic and atraumatic.      Nose: Nose normal.   Eyes:      Extraocular Movements: Extraocular movements intact.   Cardiovascular:      Rate and Rhythm: Normal rate and regular rhythm.      Heart sounds: Normal heart sounds.   Pulmonary:      Effort: Pulmonary effort is normal.   Abdominal:      General: Abdomen is flat.      Palpations: Abdomen is soft.      Tenderness: There is no abdominal tenderness. There is no guarding or rebound.   Musculoskeletal:         General: Normal range of motion.      Cervical back: Normal range of motion.   Skin:     General: Skin is warm and dry.   Neurological:      General: No focal deficit present.      Mental Status: He is alert. Mental status is at baseline.   Psychiatric:         Mood and Affect: Mood normal.         Behavior: Behavior normal.           Procedures    ED Course:    ED Course as of 04/04/23 1955 Tue Apr 04, 2023   1729 EKG interpreted by me: Sinus bradycardia, no acute ST/T changes, borderline QT, this is an abnormal EKG [MP]   1804 WBC: 5.64 [TM]   1805 Hemoglobin: 13.7 [TM]   1805 Hematocrit: 43.3 [TM]   1805 pH, Arterial(!!): 7.295 [TM]   1817 Hemoglobin A1C(!): 17.30 [TM]      ED Course User Index  [MP] Praful Gutiérrez MD  [TM] Puneet Montilla PA-C       Lab Results (last 24 hours)     Procedure Component Value Units Date/Time    " POCT Glucose [[id number]]  (Abnormal) Collected: 04/04/23 1550    Specimen: Blood Updated: 04/04/23 1551     Glucose --     Comment: BLOOD SUGAR TO HIGH TO READ.       Comprehensive Metabolic Panel [[id number]]  (Abnormal) Collected: 04/04/23 1749    Specimen: Blood Updated: 04/04/23 1827     Glucose 728 mg/dL      BUN 58 mg/dL      Creatinine 2.65 mg/dL      Sodium 125 mmol/L      Potassium 5.9 mmol/L      Comment: Slight hemolysis detected by analyzer. Results may be affected.        Chloride 90 mmol/L      CO2 14.9 mmol/L      Calcium 10.9 mg/dL      Total Protein 6.2 g/dL      Albumin 3.5 g/dL      ALT (SGPT) 27 U/L      AST (SGOT) 19 U/L      Comment: Slight hemolysis detected by analyzer. Results may be affected.        Alkaline Phosphatase 83 U/L      Total Bilirubin 0.4 mg/dL      Globulin 2.7 gm/dL      A/G Ratio 1.3 g/dL      BUN/Creatinine Ratio 21.9     Anion Gap 20.1 mmol/L      eGFR 26.1 mL/min/1.73     Narrative:      GFR Normal >60  Chronic Kidney Disease <60  Kidney Failure <15      CBC & Differential [[id number]]  (Abnormal) Collected: 04/04/23 1749    Specimen: Blood Updated: 04/04/23 1801    Narrative:      The following orders were created for panel order CBC & Differential.  Procedure                               Abnormality         Status                     ---------                               -----------         ------                     CBC Auto Differential[[id number]]        Abnormal            Final result               Scan Slide[[id number]]                                                                    Please view results for these tests on the individual orders.    Lipase [[id number]]  (Abnormal) Collected: 04/04/23 1749    Specimen: Blood Updated: 04/04/23 1824     Lipase 450 U/L     Magnesium [[id number]]  (Abnormal) Collected: 04/04/23 1749    Specimen: Blood Updated: 04/04/23 1818     Magnesium 2.5 mg/dL     Single High Sensitivity Troponin T [[id number]]  (Abnormal) Collected:  04/04/23 1749    Specimen: Blood Updated: 04/04/23 1828     HS Troponin T 57 ng/L     Narrative:      High Sensitive Troponin T Reference Range:  <10.0 ng/L- Negative Female for AMI  <15.0 ng/L- Negative Male for AMI  >=10 - Abnormal Female indicating possible myocardial injury.  >=15 - Abnormal Male indicating possible myocardial injury.   Clinicians would have to utilize clinical acumen, EKG, Troponin, and serial changes to determine if it is an Acute Myocardial Infarction or myocardial injury due to an underlying chronic condition.         CBC Auto Differential [[id number]]  (Abnormal) Collected: 04/04/23 1749    Specimen: Blood Updated: 04/04/23 1801     WBC 5.64 10*3/mm3      RBC 5.06 10*6/mm3      Hemoglobin 13.7 g/dL      Hematocrit 43.3 %      MCV 85.6 fL      MCH 27.1 pg      MCHC 31.6 g/dL      RDW 15.8 %      RDW-SD 49.1 fl      MPV 13.6 fL      Platelets 137 10*3/mm3      Neutrophil % 64.4 %      Lymphocyte % 20.7 %      Monocyte % 12.6 %      Eosinophil % 0.9 %      Basophil % 0.9 %      Immature Grans % 0.5 %      Neutrophils, Absolute 3.63 10*3/mm3      Lymphocytes, Absolute 1.17 10*3/mm3      Monocytes, Absolute 0.71 10*3/mm3      Eosinophils, Absolute 0.05 10*3/mm3      Basophils, Absolute 0.05 10*3/mm3      Immature Grans, Absolute 0.03 10*3/mm3      nRBC 0.0 /100 WBC     Hemoglobin A1c [[id number]]  (Abnormal) Collected: 04/04/23 1749    Specimen: Blood Updated: 04/04/23 1816     Hemoglobin A1C 17.30 %     Narrative:      Hemoglobin A1C Ranges:    Increased Risk for Diabetes  5.7% to 6.4%  Diabetes                     >= 6.5%  Diabetic Goal                < 7.0%    Phosphorus [[id number]]  (Normal) Collected: 04/04/23 1749    Specimen: Blood Updated: 04/04/23 1941     Phosphorus 3.6 mg/dL     Blood Gas, Arterial With Co-Ox [[id number]]  (Abnormal) Collected: 04/04/23 1757    Specimen: Arterial Blood Updated: 04/04/23 1757     Site Right Radial     Nhan's Test N/A     pH, Arterial 7.295 pH units       Comment: 84 Value below reference range        pCO2, Arterial 32.4 mm Hg      Comment: 84 Value below reference range        pO2, Arterial 81.8 mm Hg      Comment: 84 Value below reference range        HCO3, Arterial 15.8 mmol/L      Comment: 84 Value below reference range        Base Excess, Arterial -9.6 mmol/L      Comment: 84 Value below reference range        O2 Saturation, Arterial 96.1 %      Hematocrit, Blood Gas 42.5 %      Oxyhemoglobin 94.6 %      Methemoglobin 0.50 %      Carboxyhemoglobin 1.0 %      A-a DO2 25.1 mmHg      Barometric Pressure for Blood Gas 730 mmHg      Modality Room Air     Ventilator Mode NA     Note --     Collected by CJ RRT     Comment: Meter: [id number]     :  [id number]        pH, Temp Corrected --     pCO2, Temperature Corrected --     pO2, Temperature Corrected --    Lactic Acid, Plasma [[id number]]  (Abnormal) Collected: 04/04/23 1818    Specimen: Blood Updated: 04/04/23 1854     Lactate 2.2 mmol/L            No radiology results from the last 24 hrs       JACK Em is a 62 y.o. male who presents to the emergency department for evaluation of hypotension, dizziness, elevated blood glucose    Differential diagnosis includes GABBY, DKA, dehydration among other etiologies.    CBC, CMP, ABG, magnesium, A1c, lipase, urinalysis, chest x-ray, CT scan abdomen pelvis ordered for further evaluation of the patient's presentation.    Chart review if available included outside testing, previous visits, prior labs, prior imaging, available notes from prior evaluations or visits with specialists, medication list, allergies, past medical history, past surgical history when applicable.    Patient was treated with IV fluids and insulin via Glucomander protocol    Dr. Valdez requests noncontrasted CT scan of the abdomen pelvis to rule out obstruction given GABBY and elevated lipase prior to excepting    Plan for disposition is likely admission to the hospital.  Patient/family  "comfortable with and understanding of the plan.      Final diagnoses:   Diabetic ketoacidosis without coma associated with diabetes mellitus due to underlying condition   GABBY (acute kidney injury)   Hypotension, unspecified hypotension type        Puneet Montilla PA-C  04/05/23 1130      Electronically signed by Puneet Montilla PA-C at 04/05/23 1130     Nora Byrd RN at 04/04/23 1940        BG reading \"HI\" at this time.     Electronically signed by Nora Byrd RN at 04/04/23 1951     Brendon Santillan at 04/04/23 2105        Room assignment for admission will be given once ICU room is cleaned    Electronically signed by Brendon Santillan at 04/04/23 2108     Brendon Santillan at 04/04/23 2211        ICU 3 assigned     Electronically signed by Brendon Santillan at 04/04/23 2211         Facility-Administered Medications as of 4/5/2023   Medication Dose Route Frequency Provider Last Rate Last Admin   • acetaminophen (TYLENOL) tablet 650 mg  650 mg Oral Q4H PRN Lakeisha Valdez MD        Or   • acetaminophen (TYLENOL) 160 MG/5ML solution 650 mg  650 mg Oral Q4H PRN Lakeisha Valdez MD        Or   • acetaminophen (TYLENOL) suppository 650 mg  650 mg Rectal Q4H PRN Lakeisha Valdez MD       • aspirin EC tablet 81 mg  81 mg Oral Daily Lakeisha Valdez MD   81 mg at 04/05/23 0817   • atorvastatin (LIPITOR) tablet 20 mg  20 mg Oral Nightly Lakeisha Valdez MD   20 mg at 04/05/23 0002   • sennosides-docusate (PERICOLACE) 8.6-50 MG per tablet 2 tablet  2 tablet Oral BID Lakeisha Valdez MD   2 tablet at 04/05/23 0817    And   • polyethylene glycol (MIRALAX) packet 17 g  17 g Oral Daily PRN Lakeisha Valdez MD        And   • bisacodyl (DULCOLAX) EC tablet 5 mg  5 mg Oral Daily PRN Lakeisha Valdez MD        And   • bisacodyl (DULCOLAX) suppository 10 mg  10 mg Rectal Daily PRN Lakeisha Valdez MD       • [COMPLETED] calcium gluconate injection 1 g  1 g Intravenous Once Puneet Montilla PA-C   " 1 g at 04/04/23 1935   • dextrose (D50W) (25 g/50 mL) IV injection 10-50 mL  10-50 mL Intravenous Q15 Min PRN Lakeisha Valdez MD       • dextrose (D50W) (25 g/50 mL) IV injection 25 g  25 g Intravenous Q15 Min PRN Robbin Chappell DO       • dextrose (GLUTOSE) oral gel 15 g  15 g Oral Q15 Min PRN Lakeisha Valdez MD       • dextrose (GLUTOSE) oral gel 15 g  15 g Oral Q15 Min PRN Robbin Chappell DO       • dextrose 5 % and sodium chloride 0.45 % infusion  150 mL/hr Intravenous Continuous PRN Lakeisha Valdez  mL/hr at 04/05/23 1129 150 mL/hr at 04/05/23 1129   • dextrose 5 % and sodium chloride 0.45 % with KCl 20 mEq/L infusion  150 mL/hr Intravenous Continuous PRN Lakeisha Valdez MD       • dextrose 5 % and sodium chloride 0.45 % with KCl 40 mEq/L infusion  150 mL/hr Intravenous Continuous PRN Lakeisha Valdez MD       • dextrose 5 % and sodium chloride 0.9 % infusion  150 mL/hr Intravenous Continuous PRN Lakeisha Valdez MD       • dextrose 5 % and sodium chloride 0.9 % with KCl 20 mEq/L infusion  150 mL/hr Intravenous Continuous PRN Lakeisha Valdez MD       • dextrose 5 % and sodium chloride 0.9 % with KCl 40 mEq/L infusion  150 mL/hr Intravenous Continuous PRN Lakeisha Valdez MD       • glucagon (GLUCAGEN) injection 1 mg  1 mg Intramuscular Q15 Min PRN Lakeisha Valdez MD       • glucagon (GLUCAGEN) injection 1 mg  1 mg Intramuscular Q15 Min PRN Robbin Chappell DO       • heparin (porcine) 5000 UNIT/ML injection 5,000 Units  5,000 Units Subcutaneous Q12H Lakeisha Valdez MD   5,000 Units at 04/05/23 0817   • Insulin Aspart (novoLOG) injection 0-7 Units  0-7 Units Subcutaneous TID AC Robbin Chappell DO       • insulin detemir (LEVEMIR) injection 10 Units  10 Units Subcutaneous Daily Robbin Chappell DO   10 Units at 04/05/23 1128   • insulin regular 1 unit/mL in 0.9% sodium chloride (Glucommander)  0-100 Units/hr Intravenous Titrated Lakeisha Valdez MD 4.1 mL/hr at 04/05/23 1132  4.1 Units/hr at 23 1132   • levothyroxine (SYNTHROID, LEVOTHROID) tablet 150 mcg  150 mcg Oral Daily Lakeisha Valdez MD   150 mcg at 23 0817   • Magnesium Standard Dose Replacement - Follow Nurse / BPA Driven Protocol   Does not apply PRN Lakeisha Valdez MD       • metoprolol succinate XL (TOPROL-XL) 24 hr tablet 50 mg  50 mg Oral Daily Lakeisha Valdez MD   50 mg at 23 0818   • ondansetron (ZOFRAN) injection 4 mg  4 mg Intravenous Q6H PRN Lakeisha Valdez MD       • Phosphorus Replacement - Follow Nurse / BPA Driven Protocol   Does not apply PRN Lakeisha Valdez MD       • Potassium Replacement - Follow Nurse / BPA Driven Protocol   Does not apply PRN Lakeisha Valdez MD       • [COMPLETED] sodium chloride 0.9 % bolus 1,000 mL  1,000 mL Intravenous Once MeccarPuneet mondragon PA-C 2,000 mL/hr at 23 1,000 mL at 23   • [COMPLETED] sodium chloride 0.9 % bolus 1,000 mL  1,000 mL Intravenous Once MeccarPuneet mondragon PA-C 2,000 mL/hr at 238 1,000 mL at 23   • [] sodium chloride 0.9 % bolus  1,000 mL/hr Intravenous Continuous MeccarielPuneet jacob PA-C 1,000 mL/hr at 238 1,000 mL/hr at 23   • sodium chloride 0.9 % flush 10 mL  10 mL Intravenous PRN Lakeisha Valdez MD       • sodium chloride 0.9 % flush 10 mL  10 mL Intravenous Q12H Lakeisha Valdez MD   10 mL at 23 0817   • sodium chloride 0.9 % flush 10 mL  10 mL Intravenous PRN Lakeisha Valdez MD       • sodium chloride 0.9 % flush 3 mL  3 mL Intravenous Q12H Lakeisha Valdez MD   3 mL at 23 0818   • sodium chloride 0.9 % flush 3-10 mL  3-10 mL Intravenous PRN Lakeisha Valdez MD       • sodium chloride 0.9 % infusion 40 mL  40 mL Intravenous PRN Lakeisha Valdez MD       • terazosin (HYTRIN) capsule 5 mg  5 mg Oral Nightly Lakeisha Valdez MD   5 mg at 23 0002     Lab Results (last 48 hours)     Procedure Component Value Units Date/Time    STAT  Lactic Acid, Reflex [[id number]]  (Normal) Collected: 04/05/23 0944    Specimen: Blood Updated: 04/05/23 1034     Lactate 1.7 mmol/L     POC Glucose Once [[id number]]  (Abnormal) Collected: 04/05/23 1023    Specimen: Blood Updated: 04/05/23 1025     Glucose 154 mg/dL      Comment: Serial Number: [id number]:  [id number]       POC Glucose Once [[id number]]  (Abnormal) Collected: 04/05/23 0919    Specimen: Blood Updated: 04/05/23 0921     Glucose 142 mg/dL      Comment: Serial Number: [id number]:  [id number]       Magnesium [[id number]]  (Abnormal) Collected: 04/05/23 0741    Specimen: Blood Updated: 04/05/23 0905     Magnesium 2.5 mg/dL     Basic Metabolic Panel [[id number]]  (Abnormal) Collected: 04/05/23 0741    Specimen: Blood Updated: 04/05/23 0905     Glucose 174 mg/dL      BUN 47 mg/dL      Creatinine 1.72 mg/dL      Sodium 141 mmol/L      Potassium 4.4 mmol/L      Comment: Slight hemolysis detected by analyzer. Results may be affected.        Chloride 107 mmol/L      CO2 20.9 mmol/L      Calcium 10.6 mg/dL      BUN/Creatinine Ratio 27.3     Anion Gap 13.1 mmol/L      eGFR 43.8 mL/min/1.73     Narrative:      GFR Normal >60  Chronic Kidney Disease <60  Kidney Failure <15      High Sensitivity Troponin T [[id number]]  (Abnormal) Collected: 04/05/23 0741    Specimen: Blood Updated: 04/05/23 0905     HS Troponin T 37 ng/L     Narrative:      High Sensitive Troponin T Reference Range:  <10.0 ng/L- Negative Female for AMI  <15.0 ng/L- Negative Male for AMI  >=10 - Abnormal Female indicating possible myocardial injury.  >=15 - Abnormal Male indicating possible myocardial injury.   Clinicians would have to utilize clinical acumen, EKG, Troponin, and serial changes to determine if it is an Acute Myocardial Infarction or myocardial injury due to an underlying chronic condition.         Phosphorus [[id number]]  (Abnormal) Collected: 04/05/23 0741    Specimen: Blood Updated: 04/05/23 0905     Phosphorus 2.3 mg/dL     CBC  (No Diff) [[id number]]  (Abnormal) Collected: 04/05/23 0741    Specimen: Blood Updated: 04/05/23 0849     WBC 5.03 10*3/mm3      RBC 5.26 10*6/mm3      Hemoglobin 14.3 g/dL      Hematocrit 43.6 %      MCV 82.9 fL      MCH 27.2 pg      MCHC 32.8 g/dL      RDW 15.6 %      RDW-SD 46.9 fl      MPV 13.2 fL      Platelets 124 10*3/mm3     POC Glucose Once [[id number]]  (Abnormal) Collected: 04/05/23 0813    Specimen: Blood Updated: 04/05/23 0815     Glucose 195 mg/dL      Comment: Serial Number: [id number]:  [id number]       POC Glucose Once [[id number]]  (Abnormal) Collected: 04/05/23 0709    Specimen: Blood Updated: 04/05/23 0711     Glucose 185 mg/dL      Comment: Serial Number: [id number]:  [id number]       POC Glucose Once [[id number]]  (Abnormal) Collected: 04/05/23 0613    Specimen: Blood Updated: 04/05/23 0619     Glucose 196 mg/dL      Comment: Serial Number: [id number]:  [id number]       POC Glucose Once [[id number]]  (Abnormal) Collected: 04/05/23 0508    Specimen: Blood Updated: 04/05/23 0619     Glucose 197 mg/dL      Comment: Serial Number: [id number]:  [id number]       High Sensitivity Troponin T 2Hr [[id number]]  (Abnormal) Collected: 04/05/23 0341    Specimen: Blood Updated: 04/05/23 0435     HS Troponin T 45 ng/L      Troponin T Delta -2 ng/L     Narrative:      High Sensitive Troponin T Reference Range:  <10.0 ng/L- Negative Female for AMI  <15.0 ng/L- Negative Male for AMI  >=10 - Abnormal Female indicating possible myocardial injury.  >=15 - Abnormal Male indicating possible myocardial injury.   Clinicians would have to utilize clinical acumen, EKG, Troponin, and serial changes to determine if it is an Acute Myocardial Infarction or myocardial injury due to an underlying chronic condition.         Magnesium [[id number]]  (Abnormal) Collected: 04/05/23 0341    Specimen: Blood Updated: 04/05/23 0435     Magnesium 2.5 mg/dL     Basic Metabolic Panel [[id number]]  (Abnormal) Collected: 04/05/23  0341    Specimen: Blood Updated: 04/05/23 0435     Glucose 288 mg/dL      BUN 52 mg/dL      Creatinine 1.87 mg/dL      Sodium 138 mmol/L      Potassium 5.7 mmol/L      Comment: Specimen hemolyzed.  Results may be affected.        Chloride 107 mmol/L      CO2 19.0 mmol/L      Calcium 10.4 mg/dL      BUN/Creatinine Ratio 27.8     Anion Gap 12.0 mmol/L      eGFR 39.7 mL/min/1.73     Narrative:      GFR Normal >60  Chronic Kidney Disease <60  Kidney Failure <15      Phosphorus [[id number]]  (Normal) Collected: 04/05/23 0341    Specimen: Blood Updated: 04/05/23 0431     Phosphorus 2.5 mg/dL     POC Glucose Once [[id number]]  (Abnormal) Collected: 04/05/23 0410    Specimen: Blood Updated: 04/05/23 0412     Glucose 278 mg/dL      Comment: Serial Number: [id number]:  [id number]       POC Glucose Once [[id number]]  (Abnormal) Collected: 04/05/23 0303    Specimen: Blood Updated: 04/05/23 0305     Glucose 321 mg/dL      Comment: Serial Number: [id number]:  [id number]       POC Glucose Once [[id number]]  (Abnormal) Collected: 04/05/23 0156    Specimen: Blood Updated: 04/05/23 0203     Glucose 359 mg/dL      Comment: Serial Number: [id number]:  [id number]       POC Glucose Once [[id number]]  (Abnormal) Collected: 04/04/23 2319    Specimen: Blood Updated: 04/05/23 0104     Glucose 557 mg/dL      Comment: Serial Number: [id number]:  [id number]       POC Glucose Once [[id number]]  (Abnormal) Collected: 04/05/23 0057    Specimen: Blood Updated: 04/05/23 0059     Glucose 471 mg/dL      Comment: Serial Number: [id number]:  [id number]       Urinalysis, Microscopic Only - Urine, Clean Catch [[id number]]  (Abnormal) Collected: 04/04/23 2345    Specimen: Urine, Clean Catch Updated: 04/05/23 0047     RBC, UA 3-5 /HPF      WBC, UA None Seen /HPF      Bacteria, UA None Seen /HPF      Squamous Epithelial Cells, UA None Seen /HPF      Hyaline Casts, UA None Seen /LPF      Methodology Manual Light Microscopy    POC Glucose Once  [[id number]]  (Abnormal) Collected: 04/05/23 0024    Specimen: Blood Updated: 04/05/23 0026     Glucose 420 mg/dL      Comment: Serial Number: [id number]:  [id number]       Basic Metabolic Panel [[id number]]  (Abnormal) Collected: 04/04/23 2335    Specimen: Blood Updated: 04/05/23 0025     Glucose 552 mg/dL      BUN 59 mg/dL      Creatinine 2.48 mg/dL      Sodium 132 mmol/L      Potassium 4.8 mmol/L      Chloride 98 mmol/L      CO2 16.4 mmol/L      Calcium 11.0 mg/dL      BUN/Creatinine Ratio 23.8     Anion Gap 17.6 mmol/L      eGFR 28.3 mL/min/1.73     Narrative:      GFR Normal >60  Chronic Kidney Disease <60  Kidney Failure <15      Magnesium [[id number]]  (Abnormal) Collected: 04/04/23 2335    Specimen: Blood Updated: 04/05/23 0024     Magnesium 2.6 mg/dL     STAT Lactic Acid, Reflex [[id number]]  (Abnormal) Collected: 04/04/23 2335    Specimen: Blood Updated: 04/05/23 0024     Lactate 2.4 mmol/L     Urinalysis With Microscopic If Indicated (No Culture) - Urine, Clean Catch [[id number]]  (Abnormal) Collected: 04/04/23 2345    Specimen: Urine, Clean Catch Updated: 04/05/23 0013     Color, UA Yellow     Appearance, UA Clear     pH, UA <=5.0     Specific Gravity, UA 1.020     Glucose, UA >=1000 mg/dL (3+)     Ketones, UA 15 mg/dL (1+)     Bilirubin, UA Negative     Blood, UA Small (1+)     Protein, UA Negative     Leuk Esterase, UA Negative     Nitrite, UA Negative     Urobilinogen, UA 0.2 E.U./dL    High Sensitivity Troponin T [[id number]]  (Abnormal) Collected: 04/04/23 2335    Specimen: Blood Updated: 04/05/23 0012     HS Troponin T 47 ng/L     Narrative:      High Sensitive Troponin T Reference Range:  <10.0 ng/L- Negative Female for AMI  <15.0 ng/L- Negative Male for AMI  >=10 - Abnormal Female indicating possible myocardial injury.  >=15 - Abnormal Male indicating possible myocardial injury.   Clinicians would have to utilize clinical acumen, EKG, Troponin, and serial changes to determine if it is an Acute  Myocardial Infarction or myocardial injury due to an underlying chronic condition.         Phosphorus [[id number]]  (Normal) Collected: 04/04/23 2335    Specimen: Blood Updated: 04/05/23 0012     Phosphorus 3.1 mg/dL     Osmolality, Serum [[id number]] Collected: 04/04/23 2335    Specimen: Blood Updated: 04/04/23 2344    POC Glucose Once [[id number]]  (Abnormal) Collected: 04/04/23 2215    Specimen: Blood Updated: 04/04/23 2220     Glucose 535 mg/dL      Comment: Serial Number: [id number]:  [id number]       Phosphorus [[id number]]  (Normal) Collected: 04/04/23 1749    Specimen: Blood Updated: 04/04/23 1941     Phosphorus 3.6 mg/dL     Lactic Acid, Plasma [[id number]]  (Abnormal) Collected: 04/04/23 1818    Specimen: Blood Updated: 04/04/23 1854     Lactate 2.2 mmol/L     Single High Sensitivity Troponin T [[id number]]  (Abnormal) Collected: 04/04/23 1749    Specimen: Blood Updated: 04/04/23 1828     HS Troponin T 57 ng/L     Narrative:      High Sensitive Troponin T Reference Range:  <10.0 ng/L- Negative Female for AMI  <15.0 ng/L- Negative Male for AMI  >=10 - Abnormal Female indicating possible myocardial injury.  >=15 - Abnormal Male indicating possible myocardial injury.   Clinicians would have to utilize clinical acumen, EKG, Troponin, and serial changes to determine if it is an Acute Myocardial Infarction or myocardial injury due to an underlying chronic condition.         Comprehensive Metabolic Panel [[id number]]  (Abnormal) Collected: 04/04/23 1749    Specimen: Blood Updated: 04/04/23 1827     Glucose 728 mg/dL      BUN 58 mg/dL      Creatinine 2.65 mg/dL      Sodium 125 mmol/L      Potassium 5.9 mmol/L      Comment: Slight hemolysis detected by analyzer. Results may be affected.        Chloride 90 mmol/L      CO2 14.9 mmol/L      Calcium 10.9 mg/dL      Total Protein 6.2 g/dL      Albumin 3.5 g/dL      ALT (SGPT) 27 U/L      AST (SGOT) 19 U/L      Comment: Slight hemolysis detected by analyzer. Results may be  affected.        Alkaline Phosphatase 83 U/L      Total Bilirubin 0.4 mg/dL      Globulin 2.7 gm/dL      A/G Ratio 1.3 g/dL      BUN/Creatinine Ratio 21.9     Anion Gap 20.1 mmol/L      eGFR 26.1 mL/min/1.73     Narrative:      GFR Normal >60  Chronic Kidney Disease <60  Kidney Failure <15      Lipase [[id number]]  (Abnormal) Collected: 04/04/23 1749    Specimen: Blood Updated: 04/04/23 1824     Lipase 450 U/L     Magnesium [[id number]]  (Abnormal) Collected: 04/04/23 1749    Specimen: Blood Updated: 04/04/23 1818     Magnesium 2.5 mg/dL     Hemoglobin A1c [[id number]]  (Abnormal) Collected: 04/04/23 1749    Specimen: Blood Updated: 04/04/23 1816     Hemoglobin A1C 17.30 %     Narrative:      Hemoglobin A1C Ranges:    Increased Risk for Diabetes  5.7% to 6.4%  Diabetes                     >= 6.5%  Diabetic Goal                < 7.0%    CBC & Differential [[id number]]  (Abnormal) Collected: 04/04/23 1749    Specimen: Blood Updated: 04/04/23 1801    Narrative:      The following orders were created for panel order CBC & Differential.  Procedure                               Abnormality         Status                     ---------                               -----------         ------                     CBC Auto Differential[[id number]]        Abnormal            Final result               Scan Slide[[id number]]                                                                    Please view results for these tests on the individual orders.    CBC Auto Differential [[id number]]  (Abnormal) Collected: 04/04/23 1749    Specimen: Blood Updated: 04/04/23 1801     WBC 5.64 10*3/mm3      RBC 5.06 10*6/mm3      Hemoglobin 13.7 g/dL      Hematocrit 43.3 %      MCV 85.6 fL      MCH 27.1 pg      MCHC 31.6 g/dL      RDW 15.8 %      RDW-SD 49.1 fl      MPV 13.6 fL      Platelets 137 10*3/mm3      Neutrophil % 64.4 %      Lymphocyte % 20.7 %      Monocyte % 12.6 %      Eosinophil % 0.9 %      Basophil % 0.9 %      Immature Grans % 0.5 %       Neutrophils, Absolute 3.63 10*3/mm3      Lymphocytes, Absolute 1.17 10*3/mm3      Monocytes, Absolute 0.71 10*3/mm3      Eosinophils, Absolute 0.05 10*3/mm3      Basophils, Absolute 0.05 10*3/mm3      Immature Grans, Absolute 0.03 10*3/mm3      nRBC 0.0 /100 WBC     Blood Gas, Arterial With Co-Ox [[id number]]  (Abnormal) Collected: 04/04/23 1757    Specimen: Arterial Blood Updated: 04/04/23 1757     Site Right Radial     Nhan's Test N/A     pH, Arterial 7.295 pH units      Comment: 84 Value below reference range        pCO2, Arterial 32.4 mm Hg      Comment: 84 Value below reference range        pO2, Arterial 81.8 mm Hg      Comment: 84 Value below reference range        HCO3, Arterial 15.8 mmol/L      Comment: 84 Value below reference range        Base Excess, Arterial -9.6 mmol/L      Comment: 84 Value below reference range        O2 Saturation, Arterial 96.1 %      Hematocrit, Blood Gas 42.5 %      Oxyhemoglobin 94.6 %      Methemoglobin 0.50 %      Carboxyhemoglobin 1.0 %      A-a DO2 25.1 mmHg      Barometric Pressure for Blood Gas 730 mmHg      Modality Room Air     Ventilator Mode NA     Note --     Collected by CJ RRT     Comment: Meter: [id number]     :  [id number]        pH, Temp Corrected --     pCO2, Temperature Corrected --     pO2, Temperature Corrected --          Imaging Results (Last 24 Hours)     Procedure Component Value Units Date/Time    XR Chest 1 View [[id number]] Collected: 04/05/23 0742     Updated: 04/05/23 0754    Narrative:       PROCEDURE: XR CHEST 1 VW-     HISTORY: DKA     COMPARISON: 07/28/2017..     FINDINGS: The heart is normal in size. There is mild elevation right  hemidiaphragm, similar to prior. Left lung is clear. There is a new  right basilar linear density, minimal atelectasis versus scar. No area  of consolidation seen.. Decreased inspiratory effort noted with crowding  of the lung markings in both lung bases. There is no pneumothorax.   There are no acute  osseous abnormalities. Apical lordotic positioning  noted.       Impression:      Minimal right basilar atelectasis or scar..     .     This report was signed and finalized on 4/5/2023 7:52 AM by Belén Alexander MD.    CT Abdomen Pelvis Without Contrast [[id number]] Collected: 04/04/23 2051     Updated: 04/04/23 2052    Narrative:      FINAL REPORT    TECHNIQUE:  Routine axial images through the abdomen and pelvis were  obtained.    CLINICAL HISTORY:  elevated lipase, qian    FINDINGS:  Abdomen:  The gallbladder is normal.  There are incidentally  noted cysts in the left lobe of the liver.  There is an  incidental left renal cyst.  There is a small right adrenal  nodule likely an adenoma.  There is no evidence of acute  pancreatitis.  The solid abdominal organs and ureters are  otherwise unremarkable.  There is colonic diverticulosis The GI  tract is otherwise unremarkable, including the appendix.  Pelvis: The urinary bladder is normal. There is no pelvic or  abdominal ascites, adenopathy or acute osseous abnormality.      Impression:      No acute disease.    Authenticated and Electronically Signed by Khanh Echeverria M.D. on  04/04/2023 08:51:40 PM

## 2023-04-05 NOTE — PROGRESS NOTES
"    [facility]    Name:  Miller Em   Age:  62 y.o.  Sex:  male  :  1960  MRN:  [id number]   Visit Number:  [id number]  Admission Date:  2023  Date Of Service:  23  Primary Care Physician:  Praful Rapp MD     LOS: 1 day :    Chief Complaint:      High blood sugars    Subjective:    23 BG improved, requests diet tray ordered full liquid.  Transition off the insulin drip onto subcutaneous insulin.  Reviewed the need for long-term insulin and his A1c results.    History Of Presenting Illness:       Patient is a 62 y.o. male with a past medical history of diabetes mellitus type 2, hyperlipidemia, hypertension, obesity, sleep apnea, thyroid disease who presents to the emergency department complaining of hyperglycemia, hypotension and dizziness sent from PCPs office.  Patient is a non-insulin-dependent diabetic only on metformin comes in for evaluation after being seen at his PCPs office prior to arrival.  Patient Was found to be hypotensive in his PCPs office and glucose read \"high\".  Patient reports that he has been feeling weak all over recently and has been getting dizzy more frequently over the past few days. Patient denies any complaints of headache, chest pain, shortness breath, abdominal pain, nausea vomiting or diarrhea.      Edited by: Robbin Chappell DO at 2023 4535     Review of Systems:     All systems were reviewed and negative except as mentioned in subjective, assessment and plan.    Vital Signs:    Temp:  [97.3 °F (36.3 °C)-98.7 °F (37.1 °C)] 97.5 °F (36.4 °C)  Heart Rate:  [] 78  Resp:  [18-24] 18  BP: ()/(48-88) 114/79    Intake and output:    I/O last 3 completed shifts:  In: 3234.6 [P.O.:125; I.V.:3109.6]  Out: 725 [Urine:725]  I/O this shift:  In: 480 [P.O.:480]  Out: 525 [Urine:525]    Physical Examination:    Physical Exam  Vitals and nursing note reviewed.   Constitutional:       General: He is not in " acute distress.     Appearance: He is obese. He is ill-appearing.   HENT:      Head: Normocephalic and atraumatic.      Right Ear: External ear normal.      Left Ear: External ear normal.      Nose: Nose normal.      Mouth/Throat:      Mouth: Mucous membranes are dry.   Eyes:      Extraocular Movements: Extraocular movements intact.      Conjunctiva/sclera: Conjunctivae normal.      Pupils: Pupils are equal, round, and reactive to light.   Cardiovascular:      Rate and Rhythm: Normal rate and regular rhythm.      Pulses: Normal pulses.      Heart sounds: Normal heart sounds.   Pulmonary:      Effort: Pulmonary effort is normal.      Breath sounds: Normal breath sounds. No wheezing or rhonchi.   Abdominal:      General: Bowel sounds are normal. There is no distension.      Palpations: Abdomen is soft.      Tenderness: There is no abdominal tenderness.   Musculoskeletal:         General: Normal range of motion.      Cervical back: Normal range of motion and neck supple.      Right lower leg: No edema.      Left lower leg: No edema.   Skin:     General: Skin is warm and dry.      Findings: No rash.   Neurological:      General: No focal deficit present.      Mental Status: He is alert and oriented to person, place, and time. Mental status is at baseline.   Psychiatric:         Mood and Affect: Mood normal.         Behavior: Behavior normal.      Edited by: Robbin Chappell DO at 4/5/2023 1047     Laboratory results:    Results from last 7 days   Lab Units 04/05/23  1155 04/05/23  0741 04/05/23  0341 04/04/23  2335 04/04/23  1749   SODIUM mmol/L 141 141 138   < > 125*   POTASSIUM mmol/L 4.1 4.4 5.7*   < > 5.9*   CHLORIDE mmol/L 108* 107 107   < > 90*   CO2 mmol/L 22.7 20.9* 19.0*   < > 14.9*   BUN mg/dL 45* 47* 52*   < > 58*   CREATININE mg/dL 1.52* 1.72* 1.87*   < > 2.65*   CALCIUM mg/dL 11.2* 10.6* 10.4   < > 10.9*   BILIRUBIN mg/dL  --   --   --   --  0.4   ALK PHOS U/L  --   --   --   --  83   ALT (SGPT) U/L  --    --   --   --  27   AST (SGOT) U/L  --   --   --   --  19   GLUCOSE mg/dL 120* 174* 288*   < > 728*    < > = values in this interval not displayed.     Results from last 7 days   Lab Units 04/05/23  0741 04/04/23  1749   WBC 10*3/mm3 5.03 5.64   HEMOGLOBIN g/dL 14.3 13.7   HEMATOCRIT % 43.6 43.3   PLATELETS 10*3/mm3 124* 137*         Results from last 7 days   Lab Units 04/05/23  0741 04/05/23  0341 04/04/23  2335   HSTROP T ng/L 37* 45* 47*         Recent Labs     04/04/23  1757   PHART 7.295*   AWH2XIX 32.4*   PO2ART 81.8   PCW3MCL 15.8*   BASEEXCESS -9.6*      I have reviewed the patient's laboratory results.    Radiology results:    CT Abdomen Pelvis Without Contrast    Result Date: 4/4/2023  FINAL REPORT TECHNIQUE: Routine axial images through the abdomen and pelvis were obtained. CLINICAL HISTORY: elevated lipase, qian FINDINGS: Abdomen:  The gallbladder is normal.  There are incidentally noted cysts in the left lobe of the liver.  There is an incidental left renal cyst.  There is a small right adrenal nodule likely an adenoma.  There is no evidence of acute pancreatitis.  The solid abdominal organs and ureters are otherwise unremarkable.  There is colonic diverticulosis The GI tract is otherwise unremarkable, including the appendix. Pelvis: The urinary bladder is normal. There is no pelvic or abdominal ascites, adenopathy or acute osseous abnormality.     Impression: No acute disease. Authenticated and Electronically Signed by Khanh Echeverria M.D. on 04/04/2023 08:51:40 PM    XR Chest 1 View    Result Date: 4/5/2023   PROCEDURE: XR CHEST 1 VW-  HISTORY: DKA  COMPARISON: 07/28/2017..  FINDINGS: The heart is normal in size. There is mild elevation right hemidiaphragm, similar to prior. Left lung is clear. There is a new right basilar linear density, minimal atelectasis versus scar. No area of consolidation seen.. Decreased inspiratory effort noted with crowding of the lung markings in both lung bases. There is no  pneumothorax. There are no acute osseous abnormalities. Apical lordotic positioning noted.      Impression: Minimal right basilar atelectasis or scar..  .  This report was signed and finalized on 4/5/2023 7:52 AM by Belén Alexander MD.    I have reviewed the patient's radiology reports.    Medication Review:     I have reviewed the patient's active and prn medications.     Problem List:      Diabetic ketoacidosis without coma associated with diabetes mellitus due to underlying condition      Assessment/Plan:    DKA  -Glucomander protocol.  -Electrolytes replacement and IV fluids per Glucomander protocol.  -Continue to monitor with labs every 4 hours.  -No signs of infection.  -Hemoglobin A1c 17.3  -Diabetes education  -- DKA resolved transition to subcutaneous insulin.  We will do Levemir 10 twice daily plus sliding scale moderate high.     GABBY   -In the settings of DKA  -No obstruction on CT abdomen and pelvis  -Avoid nephrotoxic drugs, hold HCTZ and losartan  -Monitor kidney function  -Creatinine back near baseline DC IV fluids     Hypotension  -Appears to be due to volume depletion  -Status post IV fluid  -Hold antihypertensives except for metoprolol  -Continue to monitor     Elevated troponin  -Likely demand ischemia in the settings of GABBY and DKA  -No chest pain, low suspicion for ACS  -Continue to monitor and recheck in a.m.     Obesity, BMI 42  Sleep apnea  -Complicates all aspects of care    Disposition: Worked well with PT anticipate discharge to home tomorrow    Prophylaxis: Heparin    Edited by: Robbin Chappell DO at 4/5/2023 140     DVT Prophylaxis: heparin  Code Status:   Code Status and Medical Interventions:   Ordered at: 04/04/23 9325     Code Status (Patient has no pulse and is not breathing):    CPR (Attempt to Resuscitate)     Medical Interventions (Patient has pulse or is breathing):    Full Support      Diet:   Dietary Orders (From admission, onward)     Start     Ordered    04/05/23 0111   Diet: Diabetic Diets, Liquid Diets; Full Liquid; Consistent Carbohydrate; Texture: Regular Texture (IDDSI 7); Fluid Consistency: Thin (IDDSI 0)  Diet Effective Now        References:    Diet Order [facility]   Question Answer Comment   Diets: Diabetic Diets    Diets: Liquid Diets    Liquid Diet: Full Liquid    Diabetic Diet: Consistent Carbohydrate    Texture: Regular Texture (IDDSI 7)    Fluid Consistency: Thin (IDDSI 0)        04/05/23 1048               Discharge Plan: anticipate home 4/6/23    Robbin Chappell DO  04/05/23  14:01 EDT    Dictated utilizing Dragon dictation.

## 2023-04-06 ENCOUNTER — READMISSION MANAGEMENT (OUTPATIENT)
Dept: CALL CENTER | Facility: HOSPITAL | Age: 63
End: 2023-04-06
Payer: MEDICARE

## 2023-04-06 VITALS
DIASTOLIC BLOOD PRESSURE: 70 MMHG | RESPIRATION RATE: 16 BRPM | TEMPERATURE: 97.7 F | OXYGEN SATURATION: 99 % | HEART RATE: 90 BPM | BODY MASS INDEX: 42.45 KG/M2 | HEIGHT: 69 IN | SYSTOLIC BLOOD PRESSURE: 103 MMHG | WEIGHT: 286.6 LBS

## 2023-04-06 LAB
ANION GAP SERPL CALCULATED.3IONS-SCNC: 12.7 MMOL/L (ref 5–15)
BUN SERPL-MCNC: 32 MG/DL (ref 8–23)
BUN/CREAT SERPL: 25.2 (ref 7–25)
CALCIUM SPEC-SCNC: 10.6 MG/DL (ref 8.6–10.5)
CHLORIDE SERPL-SCNC: 104 MMOL/L (ref 98–107)
CO2 SERPL-SCNC: 18.3 MMOL/L (ref 22–29)
CREAT SERPL-MCNC: 1.27 MG/DL (ref 0.76–1.27)
DEPRECATED RDW RBC AUTO: 49.3 FL (ref 37–54)
EGFRCR SERPLBLD CKD-EPI 2021: 63.1 ML/MIN/1.73
ERYTHROCYTE [DISTWIDTH] IN BLOOD BY AUTOMATED COUNT: 15.9 % (ref 12.3–15.4)
GLUCOSE BLDC GLUCOMTR-MCNC: 250 MG/DL (ref 70–130)
GLUCOSE BLDC GLUCOMTR-MCNC: 338 MG/DL (ref 70–130)
GLUCOSE SERPL-MCNC: 305 MG/DL (ref 65–99)
HCT VFR BLD AUTO: 42.5 % (ref 37.5–51)
HGB BLD-MCNC: 13.6 G/DL (ref 13–17.7)
MCH RBC QN AUTO: 27.2 PG (ref 26.6–33)
MCHC RBC AUTO-ENTMCNC: 32 G/DL (ref 31.5–35.7)
MCV RBC AUTO: 85 FL (ref 79–97)
PLATELET # BLD AUTO: 131 10*3/MM3 (ref 140–450)
PMV BLD AUTO: 12.7 FL (ref 6–12)
POTASSIUM SERPL-SCNC: 4.5 MMOL/L (ref 3.5–5.2)
RBC # BLD AUTO: 5 10*6/MM3 (ref 4.14–5.8)
SODIUM SERPL-SCNC: 135 MMOL/L (ref 136–145)
WBC NRBC COR # BLD: 4.65 10*3/MM3 (ref 3.4–10.8)

## 2023-04-06 PROCEDURE — 82962 GLUCOSE BLOOD TEST: CPT

## 2023-04-06 PROCEDURE — 99239 HOSP IP/OBS DSCHRG MGMT >30: CPT | Performed by: INTERNAL MEDICINE

## 2023-04-06 PROCEDURE — 63710000001 INSULIN ASPART PER 5 UNITS: Performed by: INTERNAL MEDICINE

## 2023-04-06 PROCEDURE — 85027 COMPLETE CBC AUTOMATED: CPT | Performed by: INTERNAL MEDICINE

## 2023-04-06 PROCEDURE — 80048 BASIC METABOLIC PNL TOTAL CA: CPT | Performed by: INTERNAL MEDICINE

## 2023-04-06 PROCEDURE — 25010000002 HEPARIN (PORCINE) PER 1000 UNITS: Performed by: FAMILY MEDICINE

## 2023-04-06 PROCEDURE — 63710000001 INSULIN DETEMIR PER 5 UNITS: Performed by: INTERNAL MEDICINE

## 2023-04-06 RX ORDER — HUMAN INSULIN 100 [IU]/ML
20 INJECTION, SUSPENSION SUBCUTANEOUS
Qty: 3 ML | Refills: 2 | Status: SHIPPED | OUTPATIENT
Start: 2023-04-06

## 2023-04-06 RX ORDER — TAMSULOSIN HYDROCHLORIDE 0.4 MG/1
1 CAPSULE ORAL DAILY
Qty: 30 CAPSULE | Refills: 0 | Status: SHIPPED | OUTPATIENT
Start: 2023-04-06

## 2023-04-06 RX ADMIN — SENNOSIDES AND DOCUSATE SODIUM 2 TABLET: 50; 8.6 TABLET ORAL at 09:36

## 2023-04-06 RX ADMIN — HEPARIN SODIUM 5000 UNITS: 5000 INJECTION INTRAVENOUS; SUBCUTANEOUS at 09:36

## 2023-04-06 RX ADMIN — METOPROLOL SUCCINATE 50 MG: 50 TABLET, EXTENDED RELEASE ORAL at 09:36

## 2023-04-06 RX ADMIN — INSULIN ASPART 10 UNITS: 100 INJECTION, SOLUTION INTRAVENOUS; SUBCUTANEOUS at 11:16

## 2023-04-06 RX ADMIN — INSULIN DETEMIR 10 UNITS: 100 INJECTION, SOLUTION SUBCUTANEOUS at 09:37

## 2023-04-06 RX ADMIN — LEVOTHYROXINE SODIUM 150 MCG: 150 TABLET ORAL at 09:36

## 2023-04-06 RX ADMIN — ASPIRIN 81 MG: 81 TABLET, COATED ORAL at 09:36

## 2023-04-06 RX ADMIN — Medication 3 ML: at 09:37

## 2023-04-06 RX ADMIN — INSULIN ASPART 8 UNITS: 100 INJECTION, SOLUTION INTRAVENOUS; SUBCUTANEOUS at 06:55

## 2023-04-06 NOTE — DISCHARGE SUMMARY
[facility]      Name:  Miller Em   Age:  62 y.o.  Sex:  male  :  1960  MRN:  [id number]   Visit Number:  [id number]    Admission Date:  2023  Date of Discharge:  2023  Primary Care Physician:  Praful Rapp MD    Important issues to note:    Start: 7030 NPH regular Novolin preparation FlexPen 20 units twice daily, Flomax  Stop: Amoxicillin, doxazosin, hydrochlorothiazide  Follow up: PCP and endocrinology  Brief Summary: Presented with hypotension and dizziness due to due to DKA and uncontrolled blood sugars. Initially had hyperglycemia requiring an insulin drip which improved to blood sugars still hyperglycemic but stable for discharge on subcu insulin on by the time of discharge.    Discharge Diagnoses:       Diabetic ketoacidosis without coma associated with diabetes mellitus due to underlying condition        Problem List:     Active Hospital Problems    Diagnosis  POA   • **Diabetic ketoacidosis without coma associated with diabetes mellitus due to underlying condition [E08.10]  Yes      Resolved Hospital Problems   No resolved problems to display.     Presenting Problem:    Chief Complaint   Patient presents with   • Hyperglycemia   • Hypotension      Consults:     Consulting Physician(s)               None            Procedures Performed:              HOSPITAL COURSE  23 BG improved, d/w insulin, he will do 70/30, change cardura to flomax.  23 BG improved, requests diet tray ordered full liquid.  Transition off the insulin drip onto subcutaneous insulin.  Reviewed the need for long-term insulin and his A1c results.    History Of Presenting Illness:       Patient is a 62 y.o. male with a past medical history of diabetes mellitus type 2, hyperlipidemia, hypertension, obesity, sleep apnea, thyroid disease who presents to the emergency department complaining of hyperglycemia, hypotension and dizziness sent from PCPs office.   "Patient is a non-insulin-dependent diabetic only on metformin comes in for evaluation after being seen at his PCPs office prior to arrival.  Patient Was found to be hypotensive in his PCPs office and glucose read \"high\".  Patient reports that he has been feeling weak all over recently and has been getting dizzy more frequently over the past few days. Patient denies any complaints of headache, chest pain, shortness breath, abdominal pain, nausea vomiting or diarrhea.      Edited by: Robbin Chappell DO at 4/6/2023 1014  DKA  -Glucomander protocol.  -Electrolytes replacement and IV fluids per Glucomander protocol.  -Continue to monitor with labs every 4 hours.  -No signs of infection.  -Hemoglobin A1c 17.3  -Diabetes education  -- DKA resolved transition to subcutaneous insulin.  Did Levemir 10 twice daily plus sliding scale inpatient which was inadequate.  Transition to 70/30 20 units twice daily for home     GABBY   -In the settings of DKA  -No obstruction on CT abdomen and pelvis  -Avoid nephrotoxic drugs, DC HCTZ resume losartan  -Monitor kidney function  -Creatinine back near baseline DC IV fluids     Hypotension  -Appears to be due to volume depletion  -Status post IV fluid  -Transition Cardura to Flomax.  DC hydrochlorothiazide     Elevated troponin  -Likely demand ischemia in the settings of GABBY and DKA  -No chest pain, low suspicion for ACS       Obesity, BMI 42  Sleep apnea  -Complicates all aspects of care      Prophylaxis: Heparin    Edited by: Robbin Chappell DO at 4/5/2023 1401      Vital Signs:    Temp:  [97.5 °F (36.4 °C)-98.1 °F (36.7 °C)] 97.7 °F (36.5 °C)  Heart Rate:  [] 90  Resp:  [16-18] 16  BP: ()/(52-79) 103/70    Physical Exam:    Physical Exam  Vitals and nursing note reviewed.   Constitutional:       General: He is not in acute distress.     Appearance: He is obese. He is ill-appearing.   HENT:      Head: Normocephalic and atraumatic.      Right Ear: External ear normal. "      Left Ear: External ear normal.      Nose: Nose normal.      Mouth/Throat:      Mouth: Mucous membranes are dry.   Eyes:      Extraocular Movements: Extraocular movements intact.      Conjunctiva/sclera: Conjunctivae normal.      Pupils: Pupils are equal, round, and reactive to light.   Cardiovascular:      Rate and Rhythm: Normal rate and regular rhythm.      Pulses: Normal pulses.      Heart sounds: Normal heart sounds.   Pulmonary:      Effort: Pulmonary effort is normal.      Breath sounds: Normal breath sounds. No wheezing or rhonchi.   Abdominal:      General: Bowel sounds are normal. There is no distension.      Palpations: Abdomen is soft.      Tenderness: There is no abdominal tenderness.   Musculoskeletal:         General: Normal range of motion.      Cervical back: Normal range of motion and neck supple.      Right lower leg: No edema.      Left lower leg: No edema.   Skin:     General: Skin is warm and dry.      Findings: No rash.   Neurological:      General: No focal deficit present.      Mental Status: He is alert and oriented to person, place, and time. Mental status is at baseline.   Psychiatric:         Mood and Affect: Mood normal.         Behavior: Behavior normal.       Pertinent Lab Results:     Results from last 7 days   Lab Units 04/06/23  0645 04/05/23  1155 04/05/23  0741 04/04/23  2335 04/04/23  1749   SODIUM mmol/L 135* 141 141   < > 125*   POTASSIUM mmol/L 4.5 4.1 4.4   < > 5.9*   CHLORIDE mmol/L 104 108* 107   < > 90*   CO2 mmol/L 18.3* 22.7 20.9*   < > 14.9*   BUN mg/dL 32* 45* 47*   < > 58*   CREATININE mg/dL 1.27 1.52* 1.72*   < > 2.65*   CALCIUM mg/dL 10.6* 11.2* 10.6*   < > 10.9*   BILIRUBIN mg/dL  --   --   --   --  0.4   ALK PHOS U/L  --   --   --   --  83   ALT (SGPT) U/L  --   --   --   --  27   AST (SGOT) U/L  --   --   --   --  19   GLUCOSE mg/dL 305* 120* 174*   < > 728*    < > = values in this interval not displayed.     Results from last 7 days   Lab Units  04/06/23  0645 04/05/23  0741 04/04/23  1749   WBC 10*3/mm3 4.65 5.03 5.64   HEMOGLOBIN g/dL 13.6 14.3 13.7   HEMATOCRIT % 42.5 43.6 43.3   PLATELETS 10*3/mm3 131* 124* 137*         Results from last 7 days   Lab Units 04/05/23  0741 04/05/23  0341 04/04/23  2335   HSTROP T ng/L 37* 45* 47*             Results from last 7 days   Lab Units 04/04/23  1749   LIPASE U/L 450*     Results from last 7 days   Lab Units 04/04/23  1757   PH, ARTERIAL pH units 7.295*   PO2 ART mm Hg 81.8   PCO2, ARTERIAL mm Hg 32.4*   HCO3 ART mmol/L 15.8*           Pertinent Radiology Results:    Imaging Results (All)       Procedure Component Value Units Date/Time    XR Chest 1 View [[id number]] Collected: 04/05/23 0742     Updated: 04/05/23 0754    Narrative:       PROCEDURE: XR CHEST 1 VW-     HISTORY: DKA     COMPARISON: 07/28/2017..     FINDINGS: The heart is normal in size. There is mild elevation right  hemidiaphragm, similar to prior. Left lung is clear. There is a new  right basilar linear density, minimal atelectasis versus scar. No area  of consolidation seen.. Decreased inspiratory effort noted with crowding  of the lung markings in both lung bases. There is no pneumothorax.   There are no acute osseous abnormalities. Apical lordotic positioning  noted.       Impression:      Minimal right basilar atelectasis or scar..     .     This report was signed and finalized on 4/5/2023 7:52 AM by Belén Alexander MD.    CT Abdomen Pelvis Without Contrast [[id number]] Collected: 04/04/23 2051     Updated: 04/04/23 2052    Narrative:      FINAL REPORT    TECHNIQUE:  Routine axial images through the abdomen and pelvis were  obtained.    CLINICAL HISTORY:  elevated lipase, qian    FINDINGS:  Abdomen:  The gallbladder is normal.  There are incidentally  noted cysts in the left lobe of the liver.  There is an  incidental left renal cyst.  There is a small right adrenal  nodule likely an adenoma.  There is no evidence of acute  pancreatitis.  The solid  abdominal organs and ureters are  otherwise unremarkable.  There is colonic diverticulosis The GI  tract is otherwise unremarkable, including the appendix.  Pelvis: The urinary bladder is normal. There is no pelvic or  abdominal ascites, adenopathy or acute osseous abnormality.      Impression:      No acute disease.    Authenticated and Electronically Signed by Khanh Echeverria M.D. on  04/04/2023 08:51:40 PM            Echo:    Results for orders placed in visit on 07/31/18    Adult Transthoracic Echo Complete W/ Cont if Necessary Per Protocol    Interpretation Summary  · There is moderate calcification of the aortic valve mainly affecting the non coronary cusp(s).  · Left ventricular systolic function is normal. Estimated EF = 68%.    Condition on Discharge:      Stable.    Code status during the hospital stay:    Code Status and Medical Interventions:   Ordered at: 04/04/23 0538     Code Status (Patient has no pulse and is not breathing):    CPR (Attempt to Resuscitate)     Medical Interventions (Patient has pulse or is breathing):    Full Support     Discharge Disposition:    Home or Self Care    Discharge Medications:       Discharge Medications        New Medications        Instructions Start Date   NovoLIN 70/30 FlexPen Relion (70-30) 100 UNIT/ML suspension pen-injector  Generic drug: Insulin NPH Isophane & Regular   20 Units, Subcutaneous, 2 Times Daily Before Meals      tamsulosin 0.4 MG capsule 24 hr capsule  Commonly known as: FLOMAX   0.4 mg, Oral, Daily             Changes to Medications        Instructions Start Date   allopurinol 300 MG tablet  Commonly known as: Zyloprim  What changed:   · when to take this  · reasons to take this   300 mg, Oral, Daily             Continue These Medications        Instructions Start Date   Accu-Chek FastClix Lancets misc   TEST DAILY FOR DM E11.9      aspirin 81 MG EC tablet   81 mg, Oral, Daily      atorvastatin 20 MG tablet  Commonly known as: LIPITOR   TAKE 1 TABLET BY  MOUTH ONCE DAILY AT NIGHT      fluticasone 50 MCG/ACT nasal spray  Commonly known as: Flonase   2 sprays, Nasal, Daily      glucose blood test strip   Patient tests blood sugar one time daily.  Diagnosis code E11.9      latanoprost 0.005 % ophthalmic solution  Commonly known as: XALATAN   INSTILL 1 DROP INTO EACH EYE AT BEDTIME      levothyroxine 150 MCG tablet  Commonly known as: Euthyrox   150 mcg, Oral, Daily      losartan 100 MG tablet  Commonly known as: COZAAR   Take 1 tablet by mouth once daily      metFORMIN 500 MG tablet  Commonly known as: GLUCOPHAGE   Take 1 tablet by mouth once daily with breakfast      metoprolol succinate XL 50 MG 24 hr tablet  Commonly known as: TOPROL-XL   Take 1 tablet by mouth once daily      Vitamin D3 1.25 MG (91265 UT) capsule   50,000 Units, Oral, Every 7 Days             Stop These Medications      amoxicillin 500 MG capsule  Commonly known as: AMOXIL     doxazosin 4 MG tablet  Commonly known as: CARDURA     hydroCHLOROthiazide 12.5 MG tablet  Commonly known as: HYDRODIURIL            Discharge Diet:     Diet Instructions       Diet: Diabetic Diets; Consistent Carbohydrate; Regular Texture (IDDSI 7); Thin (IDDSI 0)      Discharge Diet: Diabetic Diets    Diabetic Diet: Consistent Carbohydrate    Texture: Regular Texture (IDDSI 7)    Fluid Consistency: Thin (IDDSI 0)          Activity at Discharge:       Follow-up Appointments:    Additional Instructions for the Follow-ups that You Need to Schedule       Discharge Follow-up with PCP   As directed       Currently Documented PCP:    Praful Rapp MD    PCP Phone Number:    [phone]     Follow Up Details: 1 week                Follow-up Information       Praful Rapp MD .    Specialty: Internal Medicine  Why: 1 week  Contact information:  [address] [place] [id number]  [phone]                           Future Appointments   Date Time Provider Department Center   4/19/2023  1:00 PM Brianna Grove MD  [facility]   9/19/2023  2:00 PM Roddy Styles MD [facility]     Test Results Pending at Discharge:           Robbin Chappell DO  04/06/23  10:14 EDT    Time: I spent 45 minutes on this discharge activity which included: face-to-face encounter with the patient, reviewing the data in the system, coordination of the care with the nursing staff as well as consultants, documentation, and entering orders.     Dictated utilizing Dragon dictation.

## 2023-04-06 NOTE — PLAN OF CARE
Goal Outcome Evaluation:  Plan of Care Reviewed With: patient        Progress: improving  Outcome Evaluation: pleasant patient with no complaint of pain at this time-medications given per Dr. Chappell's orders-monitor blood sugar with coverage per protocol-monitor labs and continue to monitor patient

## 2023-04-06 NOTE — OUTREACH NOTE
Prep Survey    Flowsheet Row Responses   [facility] discharged from? [facility]   Is LACE score < 7 ? No   Eligibility [facility]   Date of Admission 04/04/23   Date of Discharge 04/06/23   Discharge Disposition Home or Self Care   Discharge diagnosis Diabetic ketoacidosis    Does the patient have one of the following disease processes/diagnoses(primary or secondary)? Other   Does the patient have Home health ordered? No   Is there a DME ordered? No   Prep survey completed? Yes          ROSA ELENA DICKINSON - Registered Nurse

## 2023-04-06 NOTE — CASE MANAGEMENT/SOCIAL WORK
Case Management Discharge Note                Selected Continued Care - Discharged on 4/6/2023 Admission date: 4/4/2023 - Discharge disposition: Home or Self Care    Destination    No services have been selected for the patient.              Durable Medical Equipment    No services have been selected for the patient.              Dialysis/Infusion    No services have been selected for the patient.              Home Medical Care    No services have been selected for the patient.              Therapy    No services have been selected for the patient.              Community Resources    No services have been selected for the patient.              Community & DME    No services have been selected for the patient.                  Transportation Services  Private: Car    Final Discharge Disposition Code: 01 - home or self-care

## 2023-04-07 ENCOUNTER — TRANSITIONAL CARE MANAGEMENT TELEPHONE ENCOUNTER (OUTPATIENT)
Dept: CALL CENTER | Facility: HOSPITAL | Age: 63
End: 2023-04-07
Payer: MEDICARE

## 2023-04-07 NOTE — OUTREACH NOTE
Call Center TCM Note    Flowsheet Row Responses   [facility] patient discharged from? [facility]   Does the patient have one of the following disease processes/diagnoses(primary or secondary)? Other   TCM attempt successful? No   Unsuccessful attempts Attempt 2   Call Status Voice mail issues          Isi Mishra RN    4/7/2023, 17:15 EDT

## 2023-04-07 NOTE — OUTREACH NOTE
Call Center TCM Note    Flowsheet Row Responses   [facility] patient discharged from? [facility]   Does the patient have one of the following disease processes/diagnoses(primary or secondary)? Other   TCM attempt successful? No  [ VR]   Unsuccessful attempts Attempt 1   Call Status Voice mail issues          Isi Mishra RN    2023, 12:08 EDT

## 2023-04-07 NOTE — PAYOR COMM NOTE
"To:  [employer]  From: Sarah Osei RN  Phone: [phone]  Fax: [phone]  NPI: [id number]  TIN: [id number]  Member ID: [id number]  MRN: [id number]    Miller Sebastian (62 y.o. Male)     Date of Birth   1960    Social Security Number       Address   [address] [place] [address]    Home Phone   [phone]    MRN   [id number]       Muslim   Pentecostalism    Marital Status   Single                            Admission Date   4/4/23    Admission Type   Emergency    Admitting Provider   Lakeisha Valdez MD    Attending Provider       [facility]       Discharge Date   4/6/2023    Discharge Disposition   Home or Self Care    Discharge Destination                               Attending Provider: (none)   Allergies: Floxin [Ofloxacin], Lisinopril    Isolation: None   Infection: None   Code Status: Prior    Ht: 175.3 cm (69\")   Wt: 130 kg (286 lb 9.6 oz)    Admission Cmt: None   Principal Problem: Diabetic ketoacidosis without coma associated with diabetes mellitus due to underlying condition [E08.10]                 Active Insurance as of 4/4/2023     Primary Coverage     Payor Plan Insurance Group Employer/Plan Group    HUMANA MEDICARE REPLACEMENT HUMANA MEDICARE REPLACEMENT [id number]     Payor Plan Address Payor Plan Phone Number Payor Plan Fax Number Effective Dates    [address] [address] [phone]  4/1/2023 - None Entered    [facility] [phone]       Subscriber Name Subscriber Birth Date Member ID       MILLER SEBASTIAN 1960 [id number]           Secondary Coverage     Payor Plan Insurance Group Employer/Plan Group    [facility] [id number]     Payor Plan Address Payor Plan Phone Number Payor Plan Fax Number Effective Dates    [address] [address]   1/1/2021 - None Entered    [facility] [id number]       Subscriber Name Subscriber Birth Date Member ID       MILLER SEBASTIAN 1960 [id number]           "       Emergency Contacts      (Rel.) Home Phone Work Phone Mobile Phone    Myron Figueroa (Other) [phone] -- --               Discharge Summary      Robbin Chappell,  at 23 1014              [facility]   [facility] SUMMARY      Name:  Miller Em   Age:  62 y.o.  Sex:  male  :  1960  MRN:  [id number]   Visit Number:  [id number]    Admission Date:  2023  Date of Discharge:  2023  Primary Care Physician:  Praful Rapp MD    Important issues to note:    Start: 7030 NPH regular Novolin preparation FlexPen 20 units twice daily, Flomax  Stop: Amoxicillin, doxazosin, hydrochlorothiazide  Follow up: PCP and endocrinology  Brief Summary: Presented with hypotension and dizziness due to due to DKA and uncontrolled blood sugars. Initially had hyperglycemia requiring an insulin drip which improved to blood sugars still hyperglycemic but stable for discharge on subcu insulin on by the time of discharge.    Discharge Diagnoses:       Diabetic ketoacidosis without coma associated with diabetes mellitus due to underlying condition        Problem List:     Active Hospital Problems    Diagnosis  POA   • **Diabetic ketoacidosis without coma associated with diabetes mellitus due to underlying condition [E08.10]  Yes      Resolved Hospital Problems   No resolved problems to display.     Presenting Problem:    Chief Complaint   Patient presents with   • Hyperglycemia   • Hypotension      Consults:     Consulting Physician(s)               None            Procedures Performed:              HOSPITAL COURSE  23 BG improved, d/w insulin, he will do 70/30, change cardura to flomax.  23 BG improved, requests diet tray ordered full liquid.  Transition off the insulin drip onto subcutaneous insulin.  Reviewed the need for long-term insulin and his A1c results.    History Of Presenting Illness:       Patient is a 62 y.o. male with a past medical history of  "diabetes mellitus type 2, hyperlipidemia, hypertension, obesity, sleep apnea, thyroid disease who presents to the emergency department complaining of hyperglycemia, hypotension and dizziness sent from PCPs office.  Patient is a non-insulin-dependent diabetic only on metformin comes in for evaluation after being seen at his PCPs office prior to arrival.  Patient Was found to be hypotensive in his PCPs office and glucose read \"high\".  Patient reports that he has been feeling weak all over recently and has been getting dizzy more frequently over the past few days. Patient denies any complaints of headache, chest pain, shortness breath, abdominal pain, nausea vomiting or diarrhea.      Edited by: Robbin Chappell, DO at 4/6/2023 1014  DKA  -Glucomander protocol.  -Electrolytes replacement and IV fluids per Glucomander protocol.  -Continue to monitor with labs every 4 hours.  -No signs of infection.  -Hemoglobin A1c 17.3  -Diabetes education  -- DKA resolved transition to subcutaneous insulin.  Did Levemir 10 twice daily plus sliding scale inpatient which was inadequate.  Transition to 70/30 20 units twice daily for home     GABBY   -In the settings of DKA  -No obstruction on CT abdomen and pelvis  -Avoid nephrotoxic drugs, DC HCTZ resume losartan  -Monitor kidney function  -Creatinine back near baseline DC IV fluids     Hypotension  -Appears to be due to volume depletion  -Status post IV fluid  -Transition Cardura to Flomax.  DC hydrochlorothiazide     Elevated troponin  -Likely demand ischemia in the settings of GABBY and DKA  -No chest pain, low suspicion for ACS       Obesity, BMI 42  Sleep apnea  -Complicates all aspects of care      Prophylaxis: Heparin    Edited by: Robbin Chappell DO at 4/5/2023 1401      Vital Signs:    Temp:  [97.5 °F (36.4 °C)-98.1 °F (36.7 °C)] 97.7 °F (36.5 °C)  Heart Rate:  [] 90  Resp:  [16-18] 16  BP: ()/(52-79) 103/70    Physical Exam:    Physical Exam  Vitals and nursing " note reviewed.   Constitutional:       General: He is not in acute distress.     Appearance: He is obese. He is ill-appearing.   HENT:      Head: Normocephalic and atraumatic.      Right Ear: External ear normal.      Left Ear: External ear normal.      Nose: Nose normal.      Mouth/Throat:      Mouth: Mucous membranes are dry.   Eyes:      Extraocular Movements: Extraocular movements intact.      Conjunctiva/sclera: Conjunctivae normal.      Pupils: Pupils are equal, round, and reactive to light.   Cardiovascular:      Rate and Rhythm: Normal rate and regular rhythm.      Pulses: Normal pulses.      Heart sounds: Normal heart sounds.   Pulmonary:      Effort: Pulmonary effort is normal.      Breath sounds: Normal breath sounds. No wheezing or rhonchi.   Abdominal:      General: Bowel sounds are normal. There is no distension.      Palpations: Abdomen is soft.      Tenderness: There is no abdominal tenderness.   Musculoskeletal:         General: Normal range of motion.      Cervical back: Normal range of motion and neck supple.      Right lower leg: No edema.      Left lower leg: No edema.   Skin:     General: Skin is warm and dry.      Findings: No rash.   Neurological:      General: No focal deficit present.      Mental Status: He is alert and oriented to person, place, and time. Mental status is at baseline.   Psychiatric:         Mood and Affect: Mood normal.         Behavior: Behavior normal.       Pertinent Lab Results:     Results from last 7 days   Lab Units 04/06/23  0645 04/05/23  1155 04/05/23  0741 04/04/23  2335 04/04/23  1749   SODIUM mmol/L 135* 141 141   < > 125*   POTASSIUM mmol/L 4.5 4.1 4.4   < > 5.9*   CHLORIDE mmol/L 104 108* 107   < > 90*   CO2 mmol/L 18.3* 22.7 20.9*   < > 14.9*   BUN mg/dL 32* 45* 47*   < > 58*   CREATININE mg/dL 1.27 1.52* 1.72*   < > 2.65*   CALCIUM mg/dL 10.6* 11.2* 10.6*   < > 10.9*   BILIRUBIN mg/dL  --   --   --   --  0.4   ALK PHOS U/L  --   --   --   --  83   ALT  (SGPT) U/L  --   --   --   --  27   AST (SGOT) U/L  --   --   --   --  19   GLUCOSE mg/dL 305* 120* 174*   < > 728*    < > = values in this interval not displayed.     Results from last 7 days   Lab Units 04/06/23  0645 04/05/23  0741 04/04/23  1749   WBC 10*3/mm3 4.65 5.03 5.64   HEMOGLOBIN g/dL 13.6 14.3 13.7   HEMATOCRIT % 42.5 43.6 43.3   PLATELETS 10*3/mm3 131* 124* 137*         Results from last 7 days   Lab Units 04/05/23  0741 04/05/23  0341 04/04/23  2335   HSTROP T ng/L 37* 45* 47*             Results from last 7 days   Lab Units 04/04/23  1749   LIPASE U/L 450*     Results from last 7 days   Lab Units 04/04/23  1757   PH, ARTERIAL pH units 7.295*   PO2 ART mm Hg 81.8   PCO2, ARTERIAL mm Hg 32.4*   HCO3 ART mmol/L 15.8*           Pertinent Radiology Results:    Imaging Results (All)       Procedure Component Value Units Date/Time    XR Chest 1 View [[id number]] Collected: 04/05/23 0742     Updated: 04/05/23 0754    Narrative:       PROCEDURE: XR CHEST 1 VW-     HISTORY: DKA     COMPARISON: 07/28/2017..     FINDINGS: The heart is normal in size. There is mild elevation right  hemidiaphragm, similar to prior. Left lung is clear. There is a new  right basilar linear density, minimal atelectasis versus scar. No area  of consolidation seen.. Decreased inspiratory effort noted with crowding  of the lung markings in both lung bases. There is no pneumothorax.   There are no acute osseous abnormalities. Apical lordotic positioning  noted.       Impression:      Minimal right basilar atelectasis or scar..     .     This report was signed and finalized on 4/5/2023 7:52 AM by Belén Alexander MD.    CT Abdomen Pelvis Without Contrast [[id number]] Collected: 04/04/23 2051     Updated: 04/04/23 2052    Narrative:      FINAL REPORT    TECHNIQUE:  Routine axial images through the abdomen and pelvis were  obtained.    CLINICAL HISTORY:  elevated lipase, qian    FINDINGS:  Abdomen:  The gallbladder is normal.  There are  incidentally  noted cysts in the left lobe of the liver.  There is an  incidental left renal cyst.  There is a small right adrenal  nodule likely an adenoma.  There is no evidence of acute  pancreatitis.  The solid abdominal organs and ureters are  otherwise unremarkable.  There is colonic diverticulosis The GI  tract is otherwise unremarkable, including the appendix.  Pelvis: The urinary bladder is normal. There is no pelvic or  abdominal ascites, adenopathy or acute osseous abnormality.      Impression:      No acute disease.    Authenticated and Electronically Signed by Khanh Echeverria M.D. on  04/04/2023 08:51:40 PM            Echo:    Results for orders placed in visit on 07/31/18    Adult Transthoracic Echo Complete W/ Cont if Necessary Per Protocol    Interpretation Summary  · There is moderate calcification of the aortic valve mainly affecting the non coronary cusp(s).  · Left ventricular systolic function is normal. Estimated EF = 68%.    Condition on Discharge:      Stable.    Code status during the hospital stay:    Code Status and Medical Interventions:   Ordered at: 04/04/23 0346     Code Status (Patient has no pulse and is not breathing):    CPR (Attempt to Resuscitate)     Medical Interventions (Patient has pulse or is breathing):    Full Support     Discharge Disposition:    Home or Self Care    Discharge Medications:       Discharge Medications        New Medications        Instructions Start Date   NovoLIN 70/30 FlexPen Relion (70-30) 100 UNIT/ML suspension pen-injector  Generic drug: Insulin NPH Isophane & Regular   20 Units, Subcutaneous, 2 Times Daily Before Meals      tamsulosin 0.4 MG capsule 24 hr capsule  Commonly known as: FLOMAX   0.4 mg, Oral, Daily             Changes to Medications        Instructions Start Date   allopurinol 300 MG tablet  Commonly known as: Zyloprim  What changed:   · when to take this  · reasons to take this   300 mg, Oral, Daily             Continue These Medications         Instructions Start Date   Accu-Chek FastClix Lancets misc   TEST DAILY FOR DM E11.9      aspirin 81 MG EC tablet   81 mg, Oral, Daily      atorvastatin 20 MG tablet  Commonly known as: LIPITOR   TAKE 1 TABLET BY MOUTH ONCE DAILY AT NIGHT      fluticasone 50 MCG/ACT nasal spray  Commonly known as: Flonase   2 sprays, Nasal, Daily      glucose blood test strip   Patient tests blood sugar one time daily.  Diagnosis code E11.9      latanoprost 0.005 % ophthalmic solution  Commonly known as: XALATAN   INSTILL 1 DROP INTO EACH EYE AT BEDTIME      levothyroxine 150 MCG tablet  Commonly known as: Euthyrox   150 mcg, Oral, Daily      losartan 100 MG tablet  Commonly known as: COZAAR   Take 1 tablet by mouth once daily      metFORMIN 500 MG tablet  Commonly known as: GLUCOPHAGE   Take 1 tablet by mouth once daily with breakfast      metoprolol succinate XL 50 MG 24 hr tablet  Commonly known as: TOPROL-XL   Take 1 tablet by mouth once daily      Vitamin D3 1.25 MG (70955 UT) capsule   50,000 Units, Oral, Every 7 Days             Stop These Medications      amoxicillin 500 MG capsule  Commonly known as: AMOXIL     doxazosin 4 MG tablet  Commonly known as: CARDURA     hydroCHLOROthiazide 12.5 MG tablet  Commonly known as: HYDRODIURIL            Discharge Diet:     Diet Instructions       Diet: Diabetic Diets; Consistent Carbohydrate; Regular Texture (IDDSI 7); Thin (IDDSI 0)      Discharge Diet: Diabetic Diets    Diabetic Diet: Consistent Carbohydrate    Texture: Regular Texture (IDDSI 7)    Fluid Consistency: Thin (IDDSI 0)          Activity at Discharge:       Follow-up Appointments:    Additional Instructions for the Follow-ups that You Need to Schedule       Discharge Follow-up with PCP   As directed       Currently Documented PCP:    Praful Rapp MD    PCP Phone Number:    [phone]     Follow Up Details: 1 week                Follow-up Information       Praful Rapp MD .    Specialty: Internal  Medicine  Why: 1 week  Contact information:  [address] [facility] [address]  [place] [address]  [phone]                           Future Appointments   Date Time Provider Department Center   4/19/2023  1:00 PM Brianna Grove MD [facility] END BM [facility]   9/19/2023  2:00 PM Roddy Styles MD [facility] CD BG R [facility]     Test Results Pending at Discharge:           oRbbin Chappell DO  04/06/23  10:14 EDT    Time: I spent 45 minutes on this discharge activity which included: face-to-face encounter with the patient, reviewing the data in the system, coordination of the care with the nursing staff as well as consultants, documentation, and entering orders.     Dictated utilizing Dragon dictation.        Electronically signed by Robbin Chappell DO at 04/06/23 6118

## 2023-04-08 ENCOUNTER — TRANSITIONAL CARE MANAGEMENT TELEPHONE ENCOUNTER (OUTPATIENT)
Dept: CALL CENTER | Facility: HOSPITAL | Age: 63
End: 2023-04-08
Payer: MEDICARE

## 2023-04-08 NOTE — OUTREACH NOTE
Call Center TCM Note    Flowsheet Row Responses   [facility] patient discharged from? [facility]   Does the patient have one of the following disease processes/diagnoses(primary or secondary)? Other   TCM attempt successful? Yes   Call start time 1045   Call end time 1048   Discharge diagnosis Diabetic ketoacidosis    Meds reviewed with patient/caregiver? Yes   Is the patient having any side effects they believe may be caused by any medication additions or changes? No   Does the patient have all medications ordered at discharge? Yes   Is the patient taking all medications as directed (includes completed medication regime)? Yes   Comments Hospital d/c F/U appt is 04/18/23 @ 245. TCM complete.   Does the patient have an appointment with their PCP within 7 days of discharge? Yes   TCM call completed? Yes   Wrap up additional comments States his BS has remained over 350 since discharge. He will return to the ED today.   Call end time 1048   Would this patient benefit from a Referral to Amb Social Work? No   Is the patient interested in additional calls from an ambulatory ?  NOTE:  applies to high risk patients requiring additional follow-up. No          Sarita Shepard RN    4/8/2023, 10:48 EDT

## 2023-04-10 DIAGNOSIS — I10 ESSENTIAL HYPERTENSION: ICD-10-CM

## 2023-04-10 RX ORDER — METOPROLOL SUCCINATE 50 MG/1
50 TABLET, EXTENDED RELEASE ORAL DAILY
Qty: 90 TABLET | Refills: 0 | Status: SHIPPED | OUTPATIENT
Start: 2023-04-10 | End: 2023-04-11 | Stop reason: SDUPTHER

## 2023-04-10 NOTE — TELEPHONE ENCOUNTER
Caller: Miller Em    Relationship: Self    Best call back number: [phone]    Requested Prescriptions:   Requested Prescriptions     Pending Prescriptions Disp Refills   • metoprolol succinate XL (TOPROL-XL) 50 MG 24 hr tablet 90 tablet 0     Sig: Take 1 tablet by mouth Daily.        Pharmacy where request should be sent: [facility] [address] [place] [address] [phone] [facility] [phone] FX     Last office visit with prescribing clinician: 2/9/2023   Last telemedicine visit with prescribing clinician: 4/18/2023   Next office visit with prescribing clinician: Visit date not found     Additional details provided by patient:     Does the patient have less than a 3 day supply:  [x] Yes  [] No    Would you like a call back once the refill request has been completed: [] Yes [x] No    If the office needs to give you a call back, can they leave a voicemail: [] Yes [x] No    Maria Eugenia Whelan Rep   04/10/23 14:37 EDT

## 2023-04-11 DIAGNOSIS — I10 ESSENTIAL HYPERTENSION: ICD-10-CM

## 2023-04-11 RX ORDER — METOPROLOL SUCCINATE 50 MG/1
50 TABLET, EXTENDED RELEASE ORAL DAILY
Qty: 90 TABLET | Refills: 0 | Status: SHIPPED | OUTPATIENT
Start: 2023-04-11

## 2023-04-11 NOTE — TELEPHONE ENCOUNTER
Caller: Miller Em    Relationship: Self    Best call back number:     [phone]    Requested Prescriptions:   Requested Prescriptions     Pending Prescriptions Disp Refills   • metoprolol succinate XL (TOPROL-XL) 50 MG 24 hr tablet 90 tablet 0     Sig: Take 1 tablet by mouth Daily.      Pharmacy where request should be sent: [id number] - [place], [place] - [address] - [phone]  - [phone] FX     Last office visit with prescribing clinician: 2/9/2023   Last telemedicine visit with prescribing clinician: 4/18/2023   Next office visit with prescribing clinician: Visit date not found     Additional details provided by patient:     PATIENT STATED HE IS OUT OF THE MEDICATION    Does the patient have less than a 3 day supply:  [x] Yes  [] No    Would you like a call back once the refill request has been completed: [x] Yes [] No    If the office needs to give you a call back, can they leave a voicemail: [x] Yes [] No    Maria Eugenia Hensley Rep   04/11/23 10:12 EDT     DR BENITEZ

## 2023-04-13 ENCOUNTER — READMISSION MANAGEMENT (OUTPATIENT)
Dept: CALL CENTER | Facility: HOSPITAL | Age: 63
End: 2023-04-13
Payer: MEDICARE

## 2023-04-13 NOTE — OUTREACH NOTE
Medical Week 2 Survey    Flowsheet Row Responses   [facility] facility patient discharged from? [facility]   Does the patient have one of the following disease processes/diagnoses(primary or secondary)? Other   Week 2 attempt successful? No   Unsuccessful attempts Attempt 1          Flori NOVAK - Registered Nurse

## 2023-04-18 ENCOUNTER — READMISSION MANAGEMENT (OUTPATIENT)
Dept: CALL CENTER | Facility: HOSPITAL | Age: 63
End: 2023-04-18
Payer: MEDICARE

## 2023-04-18 ENCOUNTER — OFFICE VISIT (OUTPATIENT)
Dept: INTERNAL MEDICINE | Facility: CLINIC | Age: 63
End: 2023-04-18
Payer: MEDICARE

## 2023-04-18 VITALS
TEMPERATURE: 97.7 F | SYSTOLIC BLOOD PRESSURE: 130 MMHG | HEART RATE: 87 BPM | DIASTOLIC BLOOD PRESSURE: 72 MMHG | BODY MASS INDEX: 42.92 KG/M2 | OXYGEN SATURATION: 100 % | WEIGHT: 289.8 LBS | HEIGHT: 69 IN

## 2023-04-18 DIAGNOSIS — E08.10 DIABETIC KETOACIDOSIS WITHOUT COMA ASSOCIATED WITH DIABETES MELLITUS DUE TO UNDERLYING CONDITION: ICD-10-CM

## 2023-04-18 DIAGNOSIS — Z09 HOSPITAL DISCHARGE FOLLOW-UP: Primary | ICD-10-CM

## 2023-04-18 RX ORDER — PEN NEEDLE, DIABETIC 32GX 5/32"
NEEDLE, DISPOSABLE MISCELLANEOUS
COMMUNITY
Start: 2023-04-07

## 2023-04-18 NOTE — OUTREACH NOTE
Medical Week 2 Survey    Flowsheet Row Responses   [facility] patient discharged from? [facility]   Does the patient have one of the following disease processes/diagnoses(primary or secondary)? Other   Week 2 attempt successful? Yes   Call start time 1122   Discharge diagnosis Diabetic ketoacidosis    Call end time 1123   Meds reviewed with patient/caregiver? Yes   Is the patient having any side effects they believe may be caused by any medication additions or changes? No   Does the patient have all medications ordered at discharge? Yes   Is the patient taking all medications as directed (includes completed medication regime)? Yes   Does the patient have a primary care provider?  Yes   Does the patient have an appointment with their PCP within 7 days of discharge? Yes   Has the patient kept scheduled appointments due by today? N/A   Has home health visited the patient within 72 hours of discharge? N/A   Psychosocial issues? No   Did the patient receive a copy of their discharge instructions? Yes   Nursing interventions Reviewed instructions with patient   What is the patient's perception of their health status since discharge? Improving   Week 2 Call Completed? Yes   Graduated Yes          Lauren NOVAK - Registered Nurse

## 2023-04-19 ENCOUNTER — OFFICE VISIT (OUTPATIENT)
Dept: ENDOCRINOLOGY | Facility: CLINIC | Age: 63
End: 2023-04-19
Payer: MEDICARE

## 2023-04-19 VITALS
WEIGHT: 289 LBS | BODY MASS INDEX: 42.8 KG/M2 | SYSTOLIC BLOOD PRESSURE: 128 MMHG | OXYGEN SATURATION: 97 % | DIASTOLIC BLOOD PRESSURE: 74 MMHG | HEIGHT: 69 IN | HEART RATE: 68 BPM

## 2023-04-19 DIAGNOSIS — E55.9 VITAMIN D DEFICIENCY: ICD-10-CM

## 2023-04-19 DIAGNOSIS — E11.65 TYPE 2 DIABETES MELLITUS WITH HYPERGLYCEMIA, UNSPECIFIED WHETHER LONG TERM INSULIN USE: Primary | ICD-10-CM

## 2023-04-19 DIAGNOSIS — E83.52 HYPERCALCEMIA: ICD-10-CM

## 2023-04-19 LAB
BUN SERPL-MCNC: 10 MG/DL (ref 8–23)
BUN/CREAT SERPL: 7.8 (ref 7–25)
CALCIUM SERPL-MCNC: 11.8 MG/DL (ref 8.6–10.5)
CHLORIDE SERPL-SCNC: 105 MMOL/L (ref 98–107)
CO2 SERPL-SCNC: 25.1 MMOL/L (ref 22–29)
CREAT SERPL-MCNC: 1.28 MG/DL (ref 0.76–1.27)
EGFRCR SERPLBLD CKD-EPI 2021: 62.1 ML/MIN/1.73
ERYTHROCYTE [DISTWIDTH] IN BLOOD BY AUTOMATED COUNT: 16 % (ref 12.3–15.4)
GLUCOSE SERPL-MCNC: 97 MG/DL (ref 65–99)
HCT VFR BLD AUTO: 42.7 % (ref 37.5–51)
HGB BLD-MCNC: 13.9 G/DL (ref 13–17.7)
MAGNESIUM SERPL-MCNC: 2 MG/DL (ref 1.6–2.4)
MCH RBC QN AUTO: 27.6 PG (ref 26.6–33)
MCHC RBC AUTO-ENTMCNC: 32.6 G/DL (ref 31.5–35.7)
MCV RBC AUTO: 84.7 FL (ref 79–97)
PLATELET # BLD AUTO: 183 10*3/MM3 (ref 140–450)
POTASSIUM SERPL-SCNC: 4.3 MMOL/L (ref 3.5–5.2)
RBC # BLD AUTO: 5.04 10*6/MM3 (ref 4.14–5.8)
SODIUM SERPL-SCNC: 139 MMOL/L (ref 136–145)
WBC # BLD AUTO: 5.63 10*3/MM3 (ref 3.4–10.8)

## 2023-04-19 PROCEDURE — 3074F SYST BP LT 130 MM HG: CPT | Performed by: INTERNAL MEDICINE

## 2023-04-19 PROCEDURE — 36415 COLL VENOUS BLD VENIPUNCTURE: CPT | Performed by: INTERNAL MEDICINE

## 2023-04-19 PROCEDURE — 99204 OFFICE O/P NEW MOD 45 MIN: CPT | Performed by: INTERNAL MEDICINE

## 2023-04-19 PROCEDURE — 3078F DIAST BP <80 MM HG: CPT | Performed by: INTERNAL MEDICINE

## 2023-04-19 PROCEDURE — 3046F HEMOGLOBIN A1C LEVEL >9.0%: CPT | Performed by: INTERNAL MEDICINE

## 2023-04-19 NOTE — PROGRESS NOTES
Chief Complaint   Patient presents with   • Hyperparathyroidism   • Diabetes        New patient who is being seen in consultation regarding hyperparathyroidism at the request of Praful Rapp MD     HPI   Miller Em is a 63 y.o. male who presents for evaluation of possible hyperparathyroidism.  Patient would also like to discuss diabetes.    Patient reports that he was initially made aware of hypercalcemia approximately 5 years ago.   Patient denies taking calcium supplements or OTC medications containing calcium, such as TUMS.   Patient reports that he is taking vitamin D supplementation.    Patient denies history of hypertension treated with HCTZ or chlorthalidone.  Patient denies history of kidney dysfunction.  Patient denies history of kidney stones.  Patient denies history of osteopenia or osteoporosis.  Patient is not being  family history of calcium disorders.  Patient reports 1-2 servings per day of dairy.     Patient reports longstanding diagnosis of type 2 diabetes.  He recently presented to the ER with glucose greater than 700, hemoglobin A1c 17.3.  He was started on insulin during that hospitalization.  He reports that he had not been monitoring blood glucose prior to hospitalization.  He does note that he has had some hypoglycemia in recent days with values in the 60s.  He states that he has subsequently held next dose of insulin due to this and has questions on what to do moving forward.  He is also taking metformin 500 mg daily.    Past Medical History:   Diagnosis Date   • Allergic floxin,lisinipril   • Arthritis    • Benign prostatic hyperplasia    • Cataract    • Diabetes    • Diabetes mellitus    • Gout    • Headache, migraine    • Headache, tension-type    • Hyperlipidemia    • Hypertension    • Impaired mobility    • Obesity    • Sleep apnea    • Thyroid condition      Past Surgical History:   Procedure Laterality Date   • COLONOSCOPY     • EYE SURGERY      Cataract   • KNEE ARTHROSCOPY         Family History   Problem Relation Age of Onset   • Stroke Other    • Hyperlipidemia Other    • Stroke Father    • Hyperlipidemia Brother       Social History     Socioeconomic History   • Marital status: Single   Tobacco Use   • Smoking status: Former     Packs/day: 0.25     Years: 5.00     Pack years: 1.25     Types: Cigarettes     Quit date: 1/1/2008     Years since quitting: 15.3   • Smokeless tobacco: Never   • Tobacco comments:     on and off x 10-15 years 1 pack per week   Vaping Use   • Vaping Use: Never used   Substance and Sexual Activity   • Alcohol use: Yes     Alcohol/week: 6.0 standard drinks     Types: 3 Cans of beer, 3 Shots of liquor per week     Comment: 1-2 wkly   • Drug use: No   • Sexual activity: Yes     Partners: Female     Birth control/protection: Condom      Allergies   Allergen Reactions   • Floxin [Ofloxacin] Hives   • Lisinopril Cough      Current Outpatient Medications on File Prior to Visit   Medication Sig Dispense Refill   • Accu-Chek FastClix Lancets misc TEST DAILY FOR DM E11.9 100 each 3   • allopurinol (Zyloprim) 300 MG tablet Take 1 tablet by mouth Daily. (Patient taking differently: Take 1 tablet by mouth Daily As Needed.) 90 tablet 3   • aspirin 81 MG EC tablet Take 1 tablet by mouth Daily. 30 tablet 11   • atorvastatin (LIPITOR) 20 MG tablet TAKE 1 TABLET BY MOUTH ONCE DAILY AT NIGHT 90 tablet 3   • BD Pen Needle Agnes 2nd Gen 32G X 4 MM misc USE WITH INSULIN TWICE DAILY     • Cholecalciferol (Vitamin D3) 1.25 MG (40832 UT) capsule Take 1 capsule by mouth Every 7 (Seven) Days. 12 capsule 4   • fluticasone (Flonase) 50 MCG/ACT nasal spray 2 sprays into the nostril(s) as directed by provider Daily. 16 g 11   • glucose blood test strip Patient tests blood sugar one time daily.  Diagnosis code E11.9 100 each 3   • Insulin NPH Isophane & Regular (NovoLIN 70/30 FlexPen Relion) (70-30) 100 UNIT/ML suspension pen-injector Inject 20 Units under the skin into the appropriate area  "as directed 2 (Two) Times a Day Before Meals. 3 mL 2   • latanoprost (XALATAN) 0.005 % ophthalmic solution INSTILL 1 DROP INTO EACH EYE AT BEDTIME     • levothyroxine (Euthyrox) 150 MCG tablet Take 1 tablet by mouth Daily. 90 tablet 2   • losartan (COZAAR) 100 MG tablet Take 1 tablet by mouth once daily 90 tablet 0   • metFORMIN (GLUCOPHAGE) 500 MG tablet Take 1 tablet by mouth once daily with breakfast 90 tablet 0   • metoprolol succinate XL (TOPROL-XL) 50 MG 24 hr tablet Take 1 tablet by mouth Daily. 90 tablet 0   • tamsulosin (FLOMAX) 0.4 MG capsule 24 hr capsule Take 1 capsule by mouth Daily. 30 capsule 0     No current facility-administered medications on file prior to visit.        Review of Systems   Constitutional: Positive for activity change, appetite change and fatigue.   HENT: Positive for tinnitus.    Eyes: Positive for photophobia and redness.   Gastrointestinal: Negative for constipation, diarrhea, nausea and vomiting.   Endocrine: Negative for polyuria.        Vitals:    04/19/23 1250   BP: 128/74   BP Location: Right arm   Patient Position: Sitting   Cuff Size: Adult   Pulse: 68   SpO2: 97%   Weight: 131 kg (289 lb)   Height: 175.3 cm (69\")   Body mass index is 42.68 kg/m².     Physical Exam  Vitals reviewed.   Constitutional:       General: He is not in acute distress.     Appearance: He is obese.   HENT:      Head: Normocephalic and atraumatic.   Cardiovascular:      Rate and Rhythm: Normal rate and regular rhythm.      Heart sounds: No murmur heard.  Pulmonary:      Effort: Pulmonary effort is normal.      Breath sounds: Normal breath sounds.   Abdominal:      General: Bowel sounds are normal.      Palpations: Abdomen is soft.      Tenderness: There is no abdominal tenderness.   Skin:     General: Skin is warm and dry.   Neurological:      General: No focal deficit present.      Mental Status: He is alert.   Psychiatric:         Mood and Affect: Mood and affect normal.         Behavior: Behavior " is [facility].        Labs/Imaging  CMP:  Lab Results   Component Value Date    BUN 10 04/18/2023    CREATININE 1.28 (H) 04/18/2023    EGFRIFNONA 57 (L) 11/15/2021    EGFRIFAFRI 69 11/15/2021    BCR 7.8 04/18/2023     04/18/2023    K 4.3 04/18/2023    CO2 25.1 04/18/2023    CALCIUM 11.8 (H) 04/18/2023    PROTENTOTREF 7.0 12/16/2022    ALBUMIN 3.5 04/04/2023    LABGLOBREF 2.4 12/16/2022    LABIL2 1.9 12/16/2022    BILITOT 0.4 04/04/2023    ALKPHOS 83 04/04/2023    AST 19 04/04/2023    ALT 27 04/04/2023     HbA1c:  Lab Results   Component Value Date    HGBA1C 17.30 (H) 04/04/2023    HGBA1C 8.90 (H) 12/16/2022     Microalbumin:  Lab Results   Component Value Date    MICROALBUR 9.8 12/16/2022      Latest Reference Range & Units 12/16/22 12:01 02/06/23 09:30 04/04/23 17:49 04/04/23 23:35 04/05/23 03:41   Sodium 136 - 145 mmol/L 139  125 (L) 132 (L) 138   Potassium 3.5 - 5.2 mmol/L 4.3  5.9 (H) 4.8 5.7 (H)   CO2 22.0 - 29.0 mmol/L   14.9 (L) 16.4 (L) 19.0 (L)   CO2 22.0 - 29.0 mmol/L 26.9       Chloride 98 - 107 mmol/L 103  90 (L) 98 107   Anion Gap 5.0 - 15.0 mmol/L   20.1 (H) 17.6 (H) 12.0   Creatinine 0.76 - 1.27 mg/dL 1.33 (H)  2.65 (H) 2.48 (H) 1.87 (H)   BUN 8 - 23 mg/dL 14  58 (H) 59 (H) 52 (H)   BUN/Creatinine Ratio 7.0 - 25.0  10.5  21.9 23.8 27.8 (H)   Calcium 8.6 - 10.5 mg/dL 11.6 (H)  10.9 (H) 11.0 (H) 10.4   Ionized Calcium 4.5 - 5.6 mg/dL  6.6 (H)      eGFR >60.0 mL/min/1.73   26.1 (L) 28.3 (L) 39.7 (L)   EGFR Result >60.0 mL/min/1.73 59.7 (L)       Alkaline Phosphatase 39 - 117 U/L 78  83     Total Protein 6.0 - 8.5 g/dL 7.0  6.2     Uric Acid 3.4 - 7.0 mg/dL 6.1       ALT (SGPT) 1 - 41 U/L 32  27     AST (SGOT) 1 - 40 U/L 24  19     Total Bilirubin 0.0 - 1.2 mg/dL 0.3  0.4     Albumin 3.5 - 5.2 g/dL 4.60  3.5     Globulin gm/dL   2.7     A/G Ratio g/dL 1.9  1.3     Phosphorus 2.5 - 4.5 mg/dL   3.6 3.1 2.5   Hemoglobin A1C 4.80 - 5.60 % 8.90 (H)  17.30 (H)     PTH Intact (Serial Monitor) 15 - 65  pg/mL  81 (H)      TSH Baseline 0.270 - 4.200 uIU/mL 1.150       Free T4 0.93 - 1.70 ng/dL 1.60       25 Hydroxy, Vitamin D 30.0 - 100.0 ng/ml  17.0 (L)      (L): Data is abnormally low  (H): Data is abnormally high    Assessment and Plan    Diagnoses and all orders for this visit:    1. Type 2 diabetes mellitus with hyperglycemia, unspecified whether long term insulin use (Primary)  Uncontrolled with most recent hemoglobin A1c 17.3%  Patient reported home glucose data most improved.  He is now having occasional overnight hypoglycemia.  Discussed reduction of premixed insulin to use 18 units twice daily.  Reviewed with patient that if glycemia persists he should contact the clinic for further reduction.  Suspect overnight hypoglycemia is due to peak action of NPH.  Patient to continue metformin 500 mg daily  Patient was advised to monitor blood sugar 2 times daily.  Patient was instructed to bring glucometer to all future appointments. Patient should contact the clinic between appointments with hypoglycemia or persistent hyperglycemia.  Discussed signs and symptoms of hypoglycemia as well as hypoglycemia management via the rule of 15's.  Discussed potential for long-term complications with uncontrolled diabetes including nephropathy, neuropathy, retinopathy, increased risk for cardiac disease.  Discussed the role of diet and exercise in the management of diabetes.    2. Hypercalcemia  Patient with longstanding history of calcium elevation, values elevated since at least 2014 and back to system.  Peak value 11.8 in April 2023  Reviewed potential etiologies of hypercalcemia.  Discussed that primary hyperparathyroidism would be the most common.  Plan to complete 24-hour urine collection for calcium following correction of vitamin D deficiency.  Discussed that vitamin D deficiency can result in falsely low urine calcium.  Reviewed that should patient be found to have primary hyperparathyroidism he would meet criteria  for surgery based on degree of calcium elevation.  Determine next steps after review of labs.  Discussed potential symptoms of hypercalcemia as well as the dangers of profound calcium elevation including mental status changes, renal dysfunction, cardiac arrhythmia.  Discussed importance of maintaining hydration as dehydration can precipitate an acute rise in serum calcium.  -     PTH, Intact; Future  -     Calcitriol (1,25 di-OH Vitamin D); Future  -     Vitamin D,25-Hydroxy; Future  -     Calcium, Urine, 24 Hour - Urine, Clean Catch; Future    3. Vitamin D deficiency  Patient currently taking 50,000 international units daily  Repeat levels today       Return in about 4 months (around 8/19/2023). The patient was instructed to contact the clinic with any interval questions or concerns.    Brianna Grove MD     Dictated Utilizing Dragon Dictation

## 2023-04-19 NOTE — PROGRESS NOTES
"Transitional Care Follow Up Visit  Subjective     Miller Em is a 63 y.o. male who presents for a transitional care management visit.    Within 48 business hours after discharge our office contacted him via telephone to coordinate his care and needs.      I reviewed and discussed the details of that call along with the discharge summary, hospital problems, inpatient lab results, inpatient diagnostic studies, and consultation reports with Miller.     Current outpatient and discharge medications have been reconciled for the patient.  Reviewed by: ODETTE Morrison          4/6/2023     6:43 PM   Date of TCM Phone Call   [facility]   Date of Admission 4/4/2023   Date of Discharge 4/6/2023   Discharge Disposition Home or Self Care     Risk for Readmission (LACE) Score: 8 (4/6/2023  6:01 AM)      History of Present Illness   Course During Hospital Stay:      Patient is a 62 y.o. male with a past medical history of diabetes mellitus type 2, hyperlipidemia, hypertension, obesity, sleep apnea, thyroid disease who presented to the emergency department on 4/4/23 complaining of hyperglycemia, hypotension and dizziness. Patient is a non-insulin-dependent diabetic only on metformin comes in for evaluation after being seen at [facility] office due to glucose being \"too high to read\". Diagnosed with DKA, A1c 17.3%, glucomander protocol initiated, electrolyte replacement and IV fluids per protocol. Started on levemir, transitioned to 70/30 20 units BID before discharge. Taking as prescribed along with metformin. GABBY- losartan and hctz held and creatinine trended down to near baseline. Hypotension was due to volume depletion. Blood pressure is normal today. Patient declines dizziness, lightheadedness, abdominal pain, NVD. Glucose has trended down. Per pt, glucose 150s 2 hours after meals and morning readings have been <130s. He did have one reading 60 and denies that he had hypoglycemia symptoms. He is " checking his sugar regularly. He has changed his diet eliminated soda, breads.       Pertinent Lab Results:               Results from last 7 days   Lab Units 04/06/23  0645 04/05/23  1155 04/05/23  0741 04/04/23  2335 04/04/23  1749   SODIUM mmol/L 135* 141 141   < > 125*   POTASSIUM mmol/L 4.5 4.1 4.4   < > 5.9*   CHLORIDE mmol/L 104 108* 107   < > 90*   CO2 mmol/L 18.3* 22.7 20.9*   < > 14.9*   BUN mg/dL 32* 45* 47*   < > 58*   CREATININE mg/dL 1.27 1.52* 1.72*   < > 2.65*   CALCIUM mg/dL 10.6* 11.2* 10.6*   < > 10.9*   BILIRUBIN mg/dL  --   --   --   --  0.4   ALK PHOS U/L  --   --   --   --  83   ALT (SGPT) U/L  --   --   --   --  27   AST (SGOT) U/L  --   --   --   --  19   GLUCOSE mg/dL 305* 120* 174*   < > 728*    < > = values in this interval not displayed.             Results from last 7 days   Lab Units 04/06/23  0645 04/05/23  0741 04/04/23  1749   WBC 10*3/mm3 4.65 5.03 5.64   HEMOGLOBIN g/dL 13.6 14.3 13.7   HEMATOCRIT % 42.5 43.6 43.3   PLATELETS 10*3/mm3 131* 124* 137*                 Results from last 7 days   Lab Units 04/05/23  0741 04/05/23  0341 04/04/23  2335   HSTROP T ng/L 37* 45* 47*                   Results from last 7 days   Lab Units 04/04/23  1749   LIPASE U/L 450*           Results from last 7 days   Lab Units 04/04/23  1757   PH, ARTERIAL pH units 7.295*   PO2 ART mm Hg 81.8   PCO2, ARTERIAL mm Hg 32.4*   HCO3 ART mmol/L 15.8*             Pertinent Radiology Results:     Imaging Results (All)         Procedure Component Value Units Date/Time     XR Chest 1 View [[id number]] Collected: 04/05/23 0742       Updated: 04/05/23 0754     Narrative:        PROCEDURE: XR CHEST 1 VW-     HISTORY: DKA     COMPARISON: 07/28/2017..     FINDINGS: The heart is normal in size. There is mild elevation right  hemidiaphragm, similar to prior. Left lung is clear. There is a new  right basilar linear density, minimal atelectasis versus scar. No area  of consolidation seen.. Decreased inspiratory effort  noted with crowding  of the lung markings in both lung bases. There is no pneumothorax.   There are no acute osseous abnormalities. Apical lordotic positioning  noted.        Impression:       Minimal right basilar atelectasis or scar..     .     This report was signed and finalized on 4/5/2023 7:52 AM by Belén Alexander MD.     CT Abdomen Pelvis Without Contrast [[id number]] Collected: 04/04/23 2051       Updated: 04/04/23 2052     Narrative:       FINAL REPORT     TECHNIQUE:  Routine axial images through the abdomen and pelvis were  obtained.     CLINICAL HISTORY:  elevated lipase, qian     FINDINGS:  Abdomen:  The gallbladder is normal.  There are incidentally  noted cysts in the left lobe of the liver.  There is an  incidental left renal cyst.  There is a small right adrenal  nodule likely an adenoma.  There is no evidence of acute  pancreatitis.  The solid abdominal organs and ureters are  otherwise unremarkable.  There is colonic diverticulosis The GI  tract is otherwise unremarkable, including the appendix.  Pelvis: The urinary bladder is normal. There is no pelvic or  abdominal ascites, adenopathy or acute osseous abnormality.        Impression:       No acute disease.     Authenticated and Electronically Signed by Khanh Echeverria M.D. on  04/04/2023 08:51:40 PM          The following portions of the patient's history were reviewed and updated as appropriate: allergies, current medications, past family history, past medical history, past social history, past surgical history and problem list.      Objective   Physical Exam  Vitals and nursing note reviewed.   Constitutional:       General: He is not in acute distress.     Appearance: Normal appearance. He is obese. He is not diaphoretic.   HENT:      Head: Normocephalic and atraumatic.      Right Ear: External ear normal.      Left Ear: External ear normal.      Nose: Nose normal.      Mouth/Throat:      Mouth: Mucous membranes are moist.   Eyes:      General: No  scleral icterus.     Extraocular Movements: Extraocular movements intact.      Pupils: Pupils are equal, round, and reactive to light.   Neck:      Trachea: Trachea and phonation normal.   Cardiovascular:      Rate and Rhythm: Normal rate and regular rhythm.      Heart sounds: Normal heart sounds.   Pulmonary:      Effort: Pulmonary effort is normal.      Breath sounds: Normal breath sounds.   Abdominal:      General: Abdomen is flat. Bowel sounds are normal.      Palpations: Abdomen is soft.      Tenderness: There is no abdominal tenderness.   Musculoskeletal:         General: Normal range of motion.      Cervical back: Normal range of motion.   Skin:     General: Skin is warm and dry.      Coloration: Skin is not jaundiced or pale.   Neurological:      Mental Status: He is alert and oriented to person, place, and time.      Gait: Gait normal.   Psychiatric:         Mood and Affect: Mood normal.         Behavior: Behavior normal.         Thought Content: Thought content normal.         Judgment: Judgment normal.         Assessment & Plan   Diagnoses and all orders for this visit:    1. Hospital discharge follow-up (Primary)    2. Diabetic ketoacidosis without coma associated with diabetes mellitus due to underlying condition  -     CBC No Differential  -     Basic metabolic panel  -     Magnesium      Poorly uncontrolled T2DM. Glucose readings are trending down. Feeling better overall.   Patient has an appointment with endocrinology tomorrow (initially referred for hyperparathyroidism), informed patient to discuss T2DM with provider as well  Follow diabetic diet by decreasing carbohydrates, sugar, and processed foods.   Exercise encouraged as tolerated-- discussed low impact walking 30 minutes/day 5 days/week minimally.   Continue taking all medication as prescribed. Monitor blood glucose as discussed. Fasting blood sugars should be <130 and 2 hours after meal blood sugar should be <180. Follow up if any worsening  or hypoglycemic episodes   Annual eye exam encouraged  Check feet regularly for calluses or ulcers. Wear protective foot wear/no bare feet  Risk discussion regarding uncontrolled diabetes.   Recheck labs CBC, BMP, Mag    F/u PCP 3 months, sooner prn   ODETTE Chavira, APRN

## 2023-04-23 LAB
1,25(OH)2D SERPL-MCNC: 48 PG/ML (ref 24.8–81.5)
25(OH)D3+25(OH)D2 SERPL-MCNC: 40.5 NG/ML (ref 30–100)
PTH-INTACT SERPL-MCNC: 78 PG/ML (ref 15–65)

## 2023-04-28 LAB
CALCIUM 24H UR-MCNC: 11.3 MG/DL
CALCIUM 24H UR-MRATE: 243 MG/24 HR (ref 0–320)

## 2023-05-01 ENCOUNTER — TELEPHONE (OUTPATIENT)
Dept: ENDOCRINOLOGY | Facility: CLINIC | Age: 63
End: 2023-05-01
Payer: MEDICARE

## 2023-05-01 DIAGNOSIS — E11.59 TYPE 2 DIABETES MELLITUS WITH OTHER CIRCULATORY COMPLICATION, WITH LONG-TERM CURRENT USE OF INSULIN: ICD-10-CM

## 2023-05-01 DIAGNOSIS — Z79.4 TYPE 2 DIABETES MELLITUS WITH OTHER CIRCULATORY COMPLICATION, WITH LONG-TERM CURRENT USE OF INSULIN: ICD-10-CM

## 2023-05-01 RX ORDER — HUMAN INSULIN 100 [IU]/ML
20 INJECTION, SUSPENSION SUBCUTANEOUS
Qty: 3 ML | Refills: 2 | Status: SHIPPED | OUTPATIENT
Start: 2023-05-01

## 2023-05-01 NOTE — TELEPHONE ENCOUNTER
Caller: Miller Em    Relationship: Self    Best call back number:[phone]    Requested Prescriptions:   Requested Prescriptions     Pending Prescriptions Disp Refills   • metFORMIN (GLUCOPHAGE) 500 MG tablet 90 tablet 0     Sig: Take 1 tablet by mouth Daily With Breakfast.   • Insulin NPH Isophane & Regular (NovoLIN 70/30 FlexPen Relion) (70-30) 100 UNIT/ML suspension pen-injector 3 mL 2     Sig: Inject 20 Units under the skin into the appropriate area as directed 2 (Two) Times a Day Before Meals.        Pharmacy where request should be sent: [id number] - [place], [place] - [address] - [phone]  - [phone] FX     Last office visit with prescribing clinician: 2/9/2023   Last telemedicine visit with prescribing clinician: 7/21/2023   Next office visit with prescribing clinician: 7/21/2023     Additional details provided by patient: HAS 2 DAYS REMAINING    Does the patient have less than a 3 day supply:  [x] Yes  [] No    Would you like a call back once the refill request has been completed: [] Yes [x] No    If the office needs to give you a call back, can they leave a voicemail: [x] Yes [] No    Maria Eugenia Vo Rep   05/01/23 12:46 EDT

## 2023-05-01 NOTE — TELEPHONE ENCOUNTER
Pt called back and stated he would like to be scheduled for surgery with Dr Emerson. I told him someone would call him with all the appointment information. He verbalized understanding. He is aware that you are out of the office today.

## 2023-05-02 DIAGNOSIS — E83.52 HYPERCALCEMIA: Primary | ICD-10-CM

## 2023-05-05 RX ORDER — TAMSULOSIN HYDROCHLORIDE 0.4 MG/1
1 CAPSULE ORAL DAILY
Qty: 30 CAPSULE | Refills: 0 | Status: SHIPPED | OUTPATIENT
Start: 2023-05-05

## 2023-05-05 NOTE — TELEPHONE ENCOUNTER
Caller: Miller Em    Relationship: Self    Best call back number: [phone]    Requested Prescriptions:   Requested Prescriptions     Pending Prescriptions Disp Refills   • tamsulosin (FLOMAX) 0.4 MG capsule 24 hr capsule 30 capsule 0     Sig: Take 1 capsule by mouth Daily.        Pharmacy where request should be sent: [id number] - [place], [place] - [address] - [phone] [facility] [phone] FX     Last office visit with prescribing clinician: 2/9/2023   Last telemedicine visit with prescribing clinician: 7/21/2023   Next office visit with prescribing clinician: 7/21/2023     Additional details provided by patient:     Does the patient have less than a 3 day supply:  [x] Yes  [] No    Would you like a call back once the refill request has been completed: [] Yes [x] No    If the office needs to give you a call back, can they leave a voicemail: [] Yes [x] No    Maria Eugenia Whelan Rep   05/05/23 11:10 EDT

## 2023-06-01 RX ORDER — TAMSULOSIN HYDROCHLORIDE 0.4 MG/1
1 CAPSULE ORAL DAILY
Qty: 90 CAPSULE | Refills: 3 | Status: SHIPPED | OUTPATIENT
Start: 2023-06-01

## 2023-06-05 RX ORDER — HUMAN INSULIN 100 [IU]/ML
20 INJECTION, SUSPENSION SUBCUTANEOUS
Qty: 3 ML | Refills: 2 | OUTPATIENT
Start: 2023-06-05

## 2023-06-05 RX ORDER — HUMAN INSULIN 100 [IU]/ML
INJECTION, SUSPENSION SUBCUTANEOUS
Qty: 3 ML | Refills: 2 | Status: SHIPPED | OUTPATIENT
Start: 2023-06-05

## 2023-06-05 RX ORDER — PEN NEEDLE, DIABETIC 32GX 5/32"
1 NEEDLE, DISPOSABLE MISCELLANEOUS 2 TIMES DAILY
Qty: 200 EACH | Refills: 3 | Status: SHIPPED | OUTPATIENT
Start: 2023-06-05

## 2023-07-21 ENCOUNTER — OFFICE VISIT (OUTPATIENT)
Dept: INTERNAL MEDICINE | Facility: CLINIC | Age: 63
End: 2023-07-21
Payer: MEDICARE

## 2023-07-21 VITALS
SYSTOLIC BLOOD PRESSURE: 114 MMHG | DIASTOLIC BLOOD PRESSURE: 76 MMHG | RESPIRATION RATE: 14 BRPM | OXYGEN SATURATION: 97 % | BODY MASS INDEX: 43.4 KG/M2 | TEMPERATURE: 97.2 F | HEIGHT: 69 IN | HEART RATE: 84 BPM | WEIGHT: 293 LBS

## 2023-07-21 DIAGNOSIS — E55.9 VITAMIN D DEFICIENCY: ICD-10-CM

## 2023-07-21 DIAGNOSIS — E03.9 ACQUIRED HYPOTHYROIDISM: ICD-10-CM

## 2023-07-21 DIAGNOSIS — Z12.11 SCREEN FOR COLON CANCER: ICD-10-CM

## 2023-07-21 DIAGNOSIS — E79.0 HYPERURICEMIA: ICD-10-CM

## 2023-07-21 DIAGNOSIS — Z00.00 ROUTINE GENERAL MEDICAL EXAMINATION AT A HEALTH CARE FACILITY: ICD-10-CM

## 2023-07-21 DIAGNOSIS — E11.9 DIABETES MELLITUS WITHOUT COMPLICATION: Primary | ICD-10-CM

## 2023-07-21 LAB
EXPIRATION DATE: NORMAL
HBA1C MFR BLD: 6.8 %
Lab: NORMAL

## 2023-07-21 PROCEDURE — 3044F HG A1C LEVEL LT 7.0%: CPT | Performed by: INTERNAL MEDICINE

## 2023-07-21 PROCEDURE — 3078F DIAST BP <80 MM HG: CPT | Performed by: INTERNAL MEDICINE

## 2023-07-21 PROCEDURE — G0439 PPPS, SUBSEQ VISIT: HCPCS | Performed by: INTERNAL MEDICINE

## 2023-07-21 PROCEDURE — 99397 PER PM REEVAL EST PAT 65+ YR: CPT | Performed by: INTERNAL MEDICINE

## 2023-07-21 PROCEDURE — 83036 HEMOGLOBIN GLYCOSYLATED A1C: CPT | Performed by: INTERNAL MEDICINE

## 2023-07-21 PROCEDURE — 96160 PT-FOCUSED HLTH RISK ASSMT: CPT | Performed by: INTERNAL MEDICINE

## 2023-07-21 PROCEDURE — 3074F SYST BP LT 130 MM HG: CPT | Performed by: INTERNAL MEDICINE

## 2023-07-21 NOTE — PROGRESS NOTES
The ABCs of the Annual Wellness Visit  Subsequent Medicare Wellness Visit    Subjective      Miller Em is a 63 y.o. male who presents for a Subsequent Medicare Wellness Visit.    The following portions of the patient's history were reviewed and   updated as appropriate: allergies, current medications, past family history, past medical history, past social history, past surgical history, and problem list.    Compared to one year ago, the patient feels his physical   health is the same.    Compared to one year ago, the patient feels his mental   health is the same.    Recent Hospitalizations:  This patient has had a [facility] admission record on file within the last 365 days.    Current Medical Providers:  Patient Care Team:  Praful Rapp MD as PCP - General (Internal Medicine)    Outpatient Medications Prior to Visit   Medication Sig Dispense Refill    Accu-Chek FastClix Lancets misc TEST DAILY FOR DM E11.9 100 each 3    allopurinol (Zyloprim) 300 MG tablet Take 1 tablet by mouth Daily. (Patient taking differently: Take 1 tablet by mouth Daily As Needed.) 90 tablet 3    aspirin 81 MG EC tablet Take 1 tablet by mouth Daily. 30 tablet 11    atorvastatin (LIPITOR) 20 MG tablet TAKE 1 TABLET BY MOUTH ONCE DAILY AT NIGHT 90 tablet 3    BD Pen Needle Agnes 2nd Gen 32G X 4 MM misc Inject 1 each under the skin into the appropriate area as directed 2 (Two) Times a Day. 200 each 3    Cholecalciferol (Vitamin D3) 1.25 MG (68162 UT) capsule Take 1 capsule by mouth Every 7 (Seven) Days. 12 capsule 4    fluticasone (Flonase) 50 MCG/ACT nasal spray 2 sprays into the nostril(s) as directed by provider Daily. 16 g 11    glucose blood test strip Patient tests blood sugar one time daily.  Diagnosis code E11.9 100 each 3    latanoprost (XALATAN) 0.005 % ophthalmic solution INSTILL 1 DROP INTO EACH EYE AT BEDTIME      levothyroxine (Euthyrox) 150 MCG tablet Take 1 tablet by mouth Daily. 90 tablet 2    losartan (COZAAR)  "100 MG tablet Take 1 tablet by mouth Daily. 90 tablet 0    metFORMIN (GLUCOPHAGE) 500 MG tablet Take 1 tablet by mouth Daily With Breakfast. 90 tablet 1    metoprolol succinate XL (TOPROL-XL) 50 MG 24 hr tablet TAKE 1 TABLET BY MOUTH DAILY 90 tablet 1    NovoLIN 70/30 FlexPen (70-30) 100 UNIT/ML suspension pen-injector ADMINISTER 20 UNITS UNDER THE SKIN TWICE DAILY BEFORE MEALS AS DIRECTED 3 mL 2    tamsulosin (FLOMAX) 0.4 MG capsule 24 hr capsule TAKE 1 CAPSULE BY MOUTH DAILY 90 capsule 3     No facility-administered medications prior to visit.       No opioid medication identified on active medication list. I have reviewed chart for other potential  high risk medication/s and harmful drug interactions in the elderly.        Aspirin is on active medication list. Aspirin use is indicated based on review of current medical condition/s. Pros and cons of this therapy have been discussed today. Benefits of this medication outweigh potential harm.  Patient has been encouraged to continue taking this medication.  .      Patient Active Problem List   Diagnosis    Benign essential hypertension    Benign prostatic hypertrophy    Gout    Hepatitis    Hyperlipidemia    Hyperparathyroidism    Hypertension    Hypothyroidism    Hypoxia    RIGO on CPAP    RLS (restless legs syndrome)    Diabetes mellitus without complication    Vitamin D deficiency    Hypercalcemia    Morbid obesity    Precordial pain    Diabetic ketoacidosis without coma associated with diabetes mellitus due to underlying condition     Advance Care Planning   Advance Care Planning     Advance Directive is not on file.  ACP discussion was held with the patient during this visit. Patient does not have an advance directive, declines further assistance.     Objective    Vitals:    07/21/23 0914   BP: 114/76   Pulse: 84   Resp: 14   Temp: 97.2 °F (36.2 °C)   SpO2: 97%   Weight: 133 kg (293 lb)   Height: 175.3 cm (69\")     Estimated body mass index is 43.27 kg/m² as " "calculated from the following:    Height as of this encounter: 175.3 cm (69\").    Weight as of this encounter: 133 kg (293 lb).       General Appearance:    Alert, cooperative, no distress, appears stated age   Head:    Normocephalic, without obvious abnormality, atraumatic   Eyes:    PERRL, conjunctiva/corneas clear, EOM's intact   Ears:    Normal TM's and external ear canals, both ears   Nose:   Nares normal, septum midline, mucosa normal, no drainage   or sinus tenderness   Throat:   Lips, mucosa, and tongue normal; teeth and gums normal   Neck:   Supple, symmetrical, trachea midline, no adenopathy;        thyroid:  No enlargement/tenderness/nodules; no carotid    bruit or JVD   Back:     Symmetric, no curvature, ROM normal, no CVA tenderness   Lungs:     Clear to auscultation bilaterally, respirations unlabored   Chest wall:    No tenderness or deformity   Heart:    Regular rate and rhythm, S1 and S2 normal, no murmur,        rub or gallop   Abdomen:     Soft, non-tender, bowel sounds active all four quadrants,     no masses, no organomegaly   Extremities:   Extremities normal, atraumatic, no cyanosis or edema   Pulses:   2+ and symmetric all extremities   Skin:   Skin color, texture, turgor normal, no rashes or lesions   Lymph nodes:   Cervical, supraclavicular, and axillary nodes normal   Neurologic:   CNII-XII intact. Normal strength, sensation and reflexes       throughout          Does the patient have evidence of cognitive impairment?   No    Lab Results   Component Value Date    HGBA1C 6.8 07/21/2023          HEALTH RISK ASSESSMENT    Smoking Status:  Social History     Tobacco Use   Smoking Status Former    Packs/day: 0.25    Years: 5.00    Pack years: 1.25    Types: Cigarettes    Quit date: 1/1/2008    Years since quitting: 15.5    Passive exposure: Past   Smokeless Tobacco Never   Tobacco Comments    on and off x 10-15 years 1 pack per week     Alcohol Consumption:  Social History     Substance and " Sexual Activity   Alcohol Use Yes    Alcohol/week: 6.0 standard drinks    Types: 3 Cans of beer, 3 Shots of liquor per week    Comment: 1-2 wkly     Fall Risk Screen:    MARCIA Fall Risk Assessment was completed, and patient is at LOW risk for falls.Assessment completed on:2023    Depression Screenin/21/2023     9:30 AM   PHQ-2/PHQ-9 Depression Screening   Little Interest or Pleasure in Doing Things 0-->not at all   Feeling Down, Depressed or Hopeless 0-->not at all   PHQ-9: Brief Depression Severity Measure Score 0       Health Habits and Functional and Cognitive Screenin/21/2023     9:28 AM   Functional & Cognitive Status   Do you have difficulty preparing food and eating? No   Do you have difficulty bathing yourself, getting dressed or grooming yourself? No   Do you have difficulty using the toilet? No   Do you have difficulty moving around from place to place? No   Do you have trouble with steps or getting out of a bed or a chair? No   Current Diet Diabetic Diet   Dental Exam Not up to date   Eye Exam Up to date   Exercise (times per week) 2 times per week   Current Exercises Include Weightlifting   Do you need help using the phone?  No   Are you deaf or do you have serious difficulty hearing?  No   Do you need help to go to places out of walking distance? No   Do you need help shopping? No   Do you need help preparing meals?  No   Do you need help with housework?  No   Do you need help with laundry? No   Do you need help taking your medications? No   Do you need help managing money? No   Do you ever drive or ride in a car without wearing a seat belt? No   Have you felt unusual stress, anger or loneliness in the last month? No   Who do you live with? Alone   If you need help, do you have trouble finding someone available to you? No   Have you been bothered in the last four weeks by sexual problems? No   Do you have difficulty concentrating, remembering or making decisions? No        Age-appropriate Screening Schedule:  Refer to the list below for future screening recommendations based on patient's age, sex and/or medical conditions. Orders for these recommended tests are listed in the plan section. The patient has been provided with a written plan.    Health Maintenance   Topic Date Due    Pneumococcal Vaccine 65+ (1 - PCV) Never done    DIABETIC FOOT EXAM  07/17/2019    COLORECTAL CANCER SCREENING  08/16/2021    COVID-19 Vaccine (4 - Moderna series) 02/22/2022    DIABETIC EYE EXAM  11/15/2022    ZOSTER VACCINE (2 of 2) 08/28/2023    INFLUENZA VACCINE  10/01/2023    LIPID PANEL  12/16/2023    URINE MICROALBUMIN  12/16/2023    HEMOGLOBIN A1C  01/21/2024    ANNUAL WELLNESS VISIT  07/21/2024    TDAP/TD VACCINES (2 - Td or Tdap) 07/17/2028    HEPATITIS C SCREENING  Completed    AAA SCREEN (ONE-TIME)  Completed                  CMS Preventative Services Quick Reference  Risk Factors Identified During Encounter:    Fall Risk-High or Moderate: Discussed Fall Prevention in the home and Information on Fall Prevention Shared in After Visit Summary    The above risks/problems have been discussed with the patient.  Pertinent information has been shared with the patient in the After Visit Summary.    Diagnoses and all orders for this visit:    1. Diabetes mellitus without complication (Primary)  -     POC Glycosylated Hemoglobin (Hb A1C)  -     TSH  -     T4, Free  -     Comprehensive Metabolic Panel  -     CBC & Differential  -     Hemoglobin A1c    2. Screen for colon cancer  -     TSH  -     T4, Free  -     Comprehensive Metabolic Panel  -     CBC & Differential  -     Hemoglobin A1c    3. Acquired hypothyroidism  -     TSH  -     T4, Free  -     Comprehensive Metabolic Panel  -     CBC & Differential  -     Hemoglobin A1c    4. Routine general medical examination at a health care facility  -     TSH  -     T4, Free  -     Comprehensive Metabolic Panel  -     CBC & Differential  -     Hemoglobin  A1c    5. Hyperuricemia  -     Uric acid    6. Vitamin D deficiency  -     Vitamin D 25 hydroxy                Component  Ref Range & Units 09:37  (7/21/23) 3 mo ago  (4/4/23) 7 mo ago  (12/16/22) 1 yr ago  (11/15/21) 3 yr ago  (6/8/20) 4 yr ago  (7/1/19) 4 yr ago  (2/8/19)   Hemoglobin A1C  % 6.8 17.30 High  R 8.90 High  R, CM 7.10 High  R, CM 6.60 High  R, CM 6.70 High  R, CM 7.00 CM   Lot Number [id number]         Expiration Date 03/22/2025         [facility]          Follow Up:   Next Medicare Wellness visit to be scheduled in 1 year.      An After Visit Summary and PPPS were made available to the patient.    Dm improved with diet.    Pt using glucometer to help learn how to eat.  BS running better.      Pt on insulin multiple times a day. Good result.

## 2023-07-28 RX ORDER — HUMAN INSULIN 100 [IU]/ML
INJECTION, SUSPENSION SUBCUTANEOUS
Qty: 3 ML | Refills: 2 | Status: SHIPPED | OUTPATIENT
Start: 2023-07-28

## 2023-08-03 ENCOUNTER — PRE-ADMISSION TESTING (OUTPATIENT)
Dept: PREADMISSION TESTING | Facility: HOSPITAL | Age: 63
End: 2023-08-03
Payer: MEDICARE

## 2023-08-03 LAB
DEPRECATED RDW RBC AUTO: 58.4 FL (ref 37–54)
ERYTHROCYTE [DISTWIDTH] IN BLOOD BY AUTOMATED COUNT: 17 % (ref 12.3–15.4)
HCT VFR BLD AUTO: 47.3 % (ref 37.5–51)
HGB BLD-MCNC: 14.2 G/DL (ref 13–17.7)
MCH RBC QN AUTO: 28.2 PG (ref 26.6–33)
MCHC RBC AUTO-ENTMCNC: 30 G/DL (ref 31.5–35.7)
MCV RBC AUTO: 93.8 FL (ref 79–97)
PLATELET # BLD AUTO: 202 10*3/MM3 (ref 140–450)
PMV BLD AUTO: 11.6 FL (ref 6–12)
POTASSIUM SERPL-SCNC: 4.6 MMOL/L (ref 3.5–5.2)
QT INTERVAL: 364 MS
QTC INTERVAL: 395 MS
RBC # BLD AUTO: 5.04 10*6/MM3 (ref 4.14–5.8)
WBC NRBC COR # BLD: 5.93 10*3/MM3 (ref 3.4–10.8)

## 2023-08-03 PROCEDURE — 36415 COLL VENOUS BLD VENIPUNCTURE: CPT

## 2023-08-03 PROCEDURE — 93010 ELECTROCARDIOGRAM REPORT: CPT | Performed by: STUDENT IN AN ORGANIZED HEALTH CARE EDUCATION/TRAINING PROGRAM

## 2023-08-03 PROCEDURE — 84132 ASSAY OF SERUM POTASSIUM: CPT

## 2023-08-03 PROCEDURE — 93005 ELECTROCARDIOGRAM TRACING: CPT

## 2023-08-03 PROCEDURE — 85027 COMPLETE CBC AUTOMATED: CPT

## 2023-08-03 NOTE — DISCHARGE INSTRUCTIONS
The following information and instructions were given:    Nothing to eat or drink after midnight except sips of water with routine prescribed medication (except blood thinner, certain blood pressure medications, diabetes, or weight reducing medication) unless otherwise instructed by your physician.  Do not eat, drink, smoke or chew gum after midnight the night before surgery. This also includes no mints.    EXCEPTION: ERAS patients Patient instructed to drink 20 ounces (or until full) of Gatorade and it needs to be completed 2 hours before given arrival time on the day of surgery. (NO RED Gatorade)    Patient verbalized understanding.    DO NOT shave for two days before your procedure.  Do not wear makeup.      DO NOT wear fingernail polish (gel/regular) and/or acrylic/artificial nails on the day of surgery.   If a patient had recent manicure and would rather not remove polish or artificial nails, then the minimum requirement is that the polish/artificial nails must be removed from the middle finger on each hand.      If patient was having surgery on an upper extremity, then the patient was instructed that fingernail polish/artificial fingernails must be removed for surgery.  NO EXCEPTIONS.      If patient was having surgery on a lower extremity, then the patient was instructed that toenail polish on both extremities must be removed for surgery.  NO EXCEPTIONS.    Remove all jewelry (advised to go to jeweler if unable to remove).  Jewelry especially rings can no longer be taped for surgery.    Leave anything you consider valuable at home.      Bring the following with you (if applicable)   -picture ID and insurance cards   -Co-pay/deductible required by insurance   -Medications in the original bottles (not a list) including all over-the-counter meds     Patient must have a  for transportation home after procedure.  It must be an   adult that will take responsibility for care for 24 hours after surgery.

## 2023-08-15 ENCOUNTER — LAB REQUISITION (OUTPATIENT)
Dept: LAB | Facility: HOSPITAL | Age: 63
End: 2023-08-15
Payer: MEDICARE

## 2023-08-15 DIAGNOSIS — E21.0 PRIMARY HYPERPARATHYROIDISM: ICD-10-CM

## 2023-08-15 PROCEDURE — 88331 PATH CONSLTJ SURG 1 BLK 1SPC: CPT

## 2023-08-15 PROCEDURE — 88305 TISSUE EXAM BY PATHOLOGIST: CPT

## 2023-09-01 ENCOUNTER — OFFICE VISIT (OUTPATIENT)
Dept: INTERNAL MEDICINE | Facility: CLINIC | Age: 63
End: 2023-09-01
Payer: MEDICARE

## 2023-09-01 VITALS
HEIGHT: 69 IN | SYSTOLIC BLOOD PRESSURE: 108 MMHG | HEART RATE: 88 BPM | RESPIRATION RATE: 16 BRPM | DIASTOLIC BLOOD PRESSURE: 78 MMHG | OXYGEN SATURATION: 97 % | BODY MASS INDEX: 43.69 KG/M2 | TEMPERATURE: 96.7 F | WEIGHT: 295 LBS

## 2023-09-01 DIAGNOSIS — H02.822 CYST OF RIGHT LOWER EYELID: Primary | ICD-10-CM

## 2023-09-01 DIAGNOSIS — I10 ESSENTIAL HYPERTENSION: ICD-10-CM

## 2023-09-01 DIAGNOSIS — E11.9 DIABETES MELLITUS WITHOUT COMPLICATION: ICD-10-CM

## 2023-09-01 DIAGNOSIS — E21.3 HYPERPARATHYROIDISM: ICD-10-CM

## 2023-09-01 DIAGNOSIS — E78.00 PURE HYPERCHOLESTEROLEMIA: ICD-10-CM

## 2023-09-01 DIAGNOSIS — E03.9 ACQUIRED HYPOTHYROIDISM: ICD-10-CM

## 2023-09-01 LAB
25(OH)D3+25(OH)D2 SERPL-MCNC: 55.2 NG/ML (ref 30–100)
ALBUMIN SERPL-MCNC: 4.7 G/DL (ref 3.5–5.2)
ALBUMIN/GLOB SERPL: 2.1 G/DL
ALP SERPL-CCNC: 79 U/L (ref 39–117)
ALT SERPL-CCNC: 17 U/L (ref 1–41)
AST SERPL-CCNC: 27 U/L (ref 1–40)
BASOPHILS # BLD AUTO: 0.05 10*3/MM3 (ref 0–0.2)
BASOPHILS NFR BLD AUTO: 0.9 % (ref 0–1.5)
BILIRUB SERPL-MCNC: 0.3 MG/DL (ref 0–1.2)
BUN SERPL-MCNC: 19 MG/DL (ref 8–23)
BUN/CREAT SERPL: 14.4 (ref 7–25)
CALCIUM SERPL-MCNC: 9.7 MG/DL (ref 8.6–10.5)
CHLORIDE SERPL-SCNC: 103 MMOL/L (ref 98–107)
CO2 SERPL-SCNC: 26.2 MMOL/L (ref 22–29)
CREAT SERPL-MCNC: 1.32 MG/DL (ref 0.76–1.27)
EGFRCR SERPLBLD CKD-EPI 2021: 59.9 ML/MIN/1.73
EOSINOPHIL # BLD AUTO: 0.19 10*3/MM3 (ref 0–0.4)
EOSINOPHIL NFR BLD AUTO: 3.3 % (ref 0.3–6.2)
ERYTHROCYTE [DISTWIDTH] IN BLOOD BY AUTOMATED COUNT: 14.5 % (ref 12.3–15.4)
GLOBULIN SER CALC-MCNC: 2.2 GM/DL
GLUCOSE SERPL-MCNC: 100 MG/DL (ref 65–99)
HBA1C MFR BLD: 6.6 % (ref 4.8–5.6)
HCT VFR BLD AUTO: 44.5 % (ref 37.5–51)
HGB BLD-MCNC: 14.2 G/DL (ref 13–17.7)
IMM GRANULOCYTES # BLD AUTO: 0.02 10*3/MM3 (ref 0–0.05)
IMM GRANULOCYTES NFR BLD AUTO: 0.3 % (ref 0–0.5)
LYMPHOCYTES # BLD AUTO: 1.98 10*3/MM3 (ref 0.7–3.1)
LYMPHOCYTES NFR BLD AUTO: 34 % (ref 19.6–45.3)
MCH RBC QN AUTO: 27.4 PG (ref 26.6–33)
MCHC RBC AUTO-ENTMCNC: 31.9 G/DL (ref 31.5–35.7)
MCV RBC AUTO: 85.7 FL (ref 79–97)
MONOCYTES # BLD AUTO: 0.56 10*3/MM3 (ref 0.1–0.9)
MONOCYTES NFR BLD AUTO: 9.6 % (ref 5–12)
NEUTROPHILS # BLD AUTO: 3.02 10*3/MM3 (ref 1.7–7)
NEUTROPHILS NFR BLD AUTO: 51.9 % (ref 42.7–76)
NRBC BLD AUTO-RTO: 0 /100 WBC (ref 0–0.2)
PLATELET # BLD AUTO: 221 10*3/MM3 (ref 140–450)
POTASSIUM SERPL-SCNC: 4.3 MMOL/L (ref 3.5–5.2)
PROT SERPL-MCNC: 6.9 G/DL (ref 6–8.5)
RBC # BLD AUTO: 5.19 10*6/MM3 (ref 4.14–5.8)
SODIUM SERPL-SCNC: 142 MMOL/L (ref 136–145)
T4 FREE SERPL-MCNC: 1.65 NG/DL (ref 0.93–1.7)
TSH SERPL DL<=0.005 MIU/L-ACNC: 1.2 UIU/ML (ref 0.27–4.2)
URATE SERPL-MCNC: 5.5 MG/DL (ref 3.4–7)
WBC # BLD AUTO: 5.82 10*3/MM3 (ref 3.4–10.8)

## 2023-09-01 RX ORDER — LEVOTHYROXINE SODIUM 0.15 MG/1
150 TABLET ORAL DAILY
Qty: 90 TABLET | Refills: 2 | Status: SHIPPED | OUTPATIENT
Start: 2023-09-01

## 2023-09-01 RX ORDER — HUMAN INSULIN 100 [IU]/ML
20 INJECTION, SUSPENSION SUBCUTANEOUS
Qty: 45 ML | Refills: 3 | Status: SHIPPED | OUTPATIENT
Start: 2023-09-01

## 2023-09-01 RX ORDER — LOSARTAN POTASSIUM 100 MG/1
100 TABLET ORAL DAILY
Qty: 90 TABLET | Refills: 0 | Status: SHIPPED | OUTPATIENT
Start: 2023-09-01

## 2023-09-01 NOTE — PROGRESS NOTES
Subjective     Patient ID: Miller Em is a 63 y.o. male. Patient is here for management of multiple medical problems.     Chief Complaint   Patient presents with    Hypertension    Diabetes     History of Present Illness   Htn    Dm    Labs not done      The following portions of the patient's history were reviewed and updated as appropriate: allergies, current medications, past family history, past medical history, past social history, past surgical history and problem list.    Review of Systems    Current Outpatient Medications:     Accu-Chek FastClix Lancets misc, TEST DAILY FOR DM E11.9, Disp: 100 each, Rfl: 3    allopurinol (Zyloprim) 300 MG tablet, Take 1 tablet by mouth Daily. (Patient taking differently: Take 1 tablet by mouth Daily As Needed.), Disp: 90 tablet, Rfl: 3    aspirin 81 MG EC tablet, Take 1 tablet by mouth Daily., Disp: 30 tablet, Rfl: 11    atorvastatin (LIPITOR) 20 MG tablet, TAKE 1 TABLET BY MOUTH ONCE DAILY AT NIGHT, Disp: 90 tablet, Rfl: 3    BD Pen Needle Agnes 2nd Gen 32G X 4 MM misc, Inject 1 each under the skin into the appropriate area as directed 2 (Two) Times a Day., Disp: 200 each, Rfl: 3    Cholecalciferol (Vitamin D3) 1.25 MG (85778 UT) capsule, Take 1 capsule by mouth Every 7 (Seven) Days., Disp: 12 capsule, Rfl: 4    fluticasone (Flonase) 50 MCG/ACT nasal spray, 2 sprays into the nostril(s) as directed by provider Daily., Disp: 16 g, Rfl: 11    glucose blood test strip, Patient tests blood sugar one time daily.  Diagnosis code E11.9, Disp: 100 each, Rfl: 3    Insulin NPH Isophane & Regular (NovoLIN 70/30 FlexPen) (70-30) 100 UNIT/ML suspension pen-injector, Inject 20 Units under the skin into the appropriate area as directed 3 (Three) Times a Day Before Meals., Disp: 45 mL, Rfl: 3    latanoprost (XALATAN) 0.005 % ophthalmic solution, INSTILL 1 DROP INTO EACH EYE AT BEDTIME, Disp: , Rfl:     levothyroxine (Euthyrox) 150 MCG tablet, Take 1 tablet by mouth Daily., Disp:  "90 tablet, Rfl: 2    losartan (COZAAR) 100 MG tablet, Take 1 tablet by mouth Daily., Disp: 90 tablet, Rfl: 0    metFORMIN (GLUCOPHAGE) 500 MG tablet, Take 1 tablet by mouth Daily With Breakfast., Disp: 90 tablet, Rfl: 1    metoprolol succinate XL (TOPROL-XL) 50 MG 24 hr tablet, TAKE 1 TABLET BY MOUTH DAILY, Disp: 90 tablet, Rfl: 1    oxyCODONE-acetaminophen (PERCOCET) 5-325 MG per tablet, Take 1 tablet by mouth Every 4 (Four) Hours As Needed., Disp: 10 tablet, Rfl: 0    tamsulosin (FLOMAX) 0.4 MG capsule 24 hr capsule, TAKE 1 CAPSULE BY MOUTH DAILY, Disp: 90 capsule, Rfl: 3    Objective      Blood pressure 108/78, pulse 88, temperature 96.7 øF (35.9 øC), resp. rate 16, height 175.3 cm (69\"), weight 134 kg (295 lb), SpO2 97 %.    Physical Exam     General Appearance:    Alert, cooperative, no distress, appears stated age   Head:    Normocephalic, without obvious abnormality, atraumatic   Eyes:    PERRL, conjunctiva/corneas clear, EOM's intact   Ears:    Normal TM's and external ear canals, both ears   Nose:   Nares normal, septum midline, mucosa normal, no drainage   or sinus tenderness   Throat:   Lips, mucosa, and tongue normal; teeth and gums normal   Neck:   Supple, symmetrical, trachea midline, no adenopathy;        thyroid:  No enlargement/tenderness/nodules; no carotid    bruit or JVD   Back:     Symmetric, no curvature, ROM normal, no CVA tenderness   Lungs:     Clear to auscultation bilaterally, respirations unlabored   Chest wall:    No tenderness or deformity   Heart:    Regular rate and rhythm, S1 and S2 normal, no murmur,        rub or gallop   Abdomen:     Soft, non-tender, bowel sounds active all four quadrants,     no masses, no organomegaly   Extremities:   Extremities normal, atraumatic, no cyanosis or edema   Pulses:   2+ and symmetric all extremities   Skin:   Skin color, texture, turgor normal, no rashes or lesions   Lymph nodes:   Cervical, supraclavicular, and axillary nodes normal " "  Neurologic:   CNII-XII intact. Normal strength, sensation and reflexes       throughout      Results for orders placed or performed in visit on 08/15/23   TISSUE EXAM, [facility]    Specimen: Tissue   Result Value Ref Range    Reference Lab Report       [facility]  [address] [place] [address]  Phone: [phone] or [phone]  Fax: [phone]  Leland Baker M.D., Medical Director    PATIENT NAME                           LABORATORY NO.  [facility]NS[facility].              [id number]  [id number]                         AGE              SEX  SSN           CLIENT REF #  [facility]           63      1960      [id number]   [id number]    [address]              REQUESTING M.D.     ATTENDING [place] [address]                TOAN EMERSON  DATE COLLECTED      DATE RECEIVED      DATE REPORTED  08/15/2023          08/15/2023         2023    DIAGNOSIS:  PARATHYROID GLAND, RIGHT INFERIOR:  Abnormal hypercellular parathyroid gland    COOKIE    CLINICAL HISTORY:  Primary hyperparathyroidism    SPECIMENS RECEIVED:  PARATHYROID GLAND, RIGHT INFERIOR    FROZEN SECTION INTERPRETATION:  Frozen section performed by Dr. Schwarz at [facility]  (refer to above for address).  Frozen section diagnosis:  Hypercellular  parathyroid gland.  Total time:  10 minutes.  Frozen section stains are acceptable.  The patient ID is verified and a verbal report was given to Dr. Emerson by Dr. Schwarz.    MICROSCOPIC DESCRIPTION:  Tissue blocks are prepared and slides are examined microscopically on all  specimens. See diagnosis for details.    Professional interpretation rendered by Lamberto Schwarz M.D., F.C.A.P. at [facility], [address] [place] [address], [place] [address].    GROSS DESCRIPTION:  Specimen received in formalin labeled \"parathyroid-possible right inferior\" and  consists of a 1.8 x 1.0 x 0.8 cm portion of " tan-brown nodular tissue.  A  representative section is frozen and resubmitted in cassette A1.  The remainder  of the specimen is further sectioned and submitted in cassettes A2-A3.  JTM    REVIEWED, DIAGNOSED AND ELECTRONICALLY  SIGNED BY:    Lamberto Schwarz M.D., GETACHEW.  CPT CODES:  45943, 53309           Assessment & Plan     Pt has been using less insulin.     On low bs 70.      Diagnoses and all orders for this visit:    1. Cyst of right lower eyelid (Primary)  -     Ambulatory Referral to Ophthalmology    2. Pure hypercholesterolemia  -     losartan (COZAAR) 100 MG tablet; Take 1 tablet by mouth Daily.  Dispense: 90 tablet; Refill: 0    3. Acquired hypothyroidism  -     losartan (COZAAR) 100 MG tablet; Take 1 tablet by mouth Daily.  Dispense: 90 tablet; Refill: 0  -     levothyroxine (Euthyrox) 150 MCG tablet; Take 1 tablet by mouth Daily.  Dispense: 90 tablet; Refill: 2    4. Essential hypertension  -     losartan (COZAAR) 100 MG tablet; Take 1 tablet by mouth Daily.  Dispense: 90 tablet; Refill: 0    5. Hyperparathyroidism  -     losartan (COZAAR) 100 MG tablet; Take 1 tablet by mouth Daily.  Dispense: 90 tablet; Refill: 0    6. Diabetes mellitus without complication  -     Ambulatory Referral to Ophthalmology    Other orders  -     Insulin NPH Isophane & Regular (NovoLIN 70/30 FlexPen) (70-30) 100 UNIT/ML suspension pen-injector; Inject 20 Units under the skin into the appropriate area as directed 3 (Three) Times a Day Before Meals.  Dispense: 45 mL; Refill: 3      Return in about 6 months (around 3/1/2024).     Will rtc sooner if ha1c to high.     Trying to drink coffee black.          There are no Patient Instructions on file for this visit.     Praful Rapp MD    Assessment & Plan     Answers submitted by the patient for this visit:  Primary Reason for Visit (Submitted on 9/1/2023)  What is the primary reason for your visit?: Other  Other (Submitted on 9/1/2023)  Please describe your symptoms.:  follow up?  Have you had these symptoms before?: No  How long have you been having these symptoms?: Greater than 2 weeks

## 2023-09-05 ENCOUNTER — TELEPHONE (OUTPATIENT)
Dept: ENDOCRINOLOGY | Facility: CLINIC | Age: 63
End: 2023-09-05

## 2023-09-05 NOTE — TELEPHONE ENCOUNTER
PT RETURNED OUR CALL. I READ HIM NOTE FROM DR JUNIOR. PT STATED HE IS NOT EXPERIENCING ANY PROBLEMS. HE STATED HE WOULD SEE US AT HIS APPT IN A COUPLE WEEKS.

## 2023-09-05 NOTE — TELEPHONE ENCOUNTER
Provider: SANDI    Caller: JOHNSON SEBASTIAN    Relationship to Patient: SELF    Reason for Call: PATIENT HAS AN APPT WITH DR JUNIOR ON 9/15. HE NEEDS A 24 HOUR URINE BEFORE APPT. HE WOULD LIKE TO KNOW IF HE CAN HAVE THE ORDER SENT TO THE PLACE HE PICKED IT UP FROM LAST TIME BACK IN [place]. HE CAN'T REMEMBER THE NAME OF THE OFFICE HE PICKED IT UP IN THERE IN [place]. CAN SOMEONE PLEASE SEND ORDER TO SAME PLACE HE PICKED UP FROM? ANY QUESTIONS PLEASE CALL PATIENT.

## 2023-09-05 NOTE — TELEPHONE ENCOUNTER
Please contact patient for more information.  He completed a 24-hour urine collection in April. From my standpoint, he does not need to do another unless something has changed clinically that I am not aware of.

## 2023-09-15 ENCOUNTER — OFFICE VISIT (OUTPATIENT)
Dept: ENDOCRINOLOGY | Facility: CLINIC | Age: 63
End: 2023-09-15
Payer: MEDICARE

## 2023-09-15 VITALS
WEIGHT: 298 LBS | OXYGEN SATURATION: 99 % | DIASTOLIC BLOOD PRESSURE: 74 MMHG | HEIGHT: 69 IN | BODY MASS INDEX: 44.14 KG/M2 | HEART RATE: 96 BPM | SYSTOLIC BLOOD PRESSURE: 118 MMHG

## 2023-09-15 DIAGNOSIS — Z79.4 TYPE 2 DIABETES MELLITUS WITHOUT COMPLICATION, WITH LONG-TERM CURRENT USE OF INSULIN: ICD-10-CM

## 2023-09-15 DIAGNOSIS — E21.0 PRIMARY HYPERPARATHYROIDISM: Primary | ICD-10-CM

## 2023-09-15 DIAGNOSIS — E11.9 TYPE 2 DIABETES MELLITUS WITHOUT COMPLICATION, WITH LONG-TERM CURRENT USE OF INSULIN: ICD-10-CM

## 2023-09-15 PROCEDURE — 3074F SYST BP LT 130 MM HG: CPT | Performed by: INTERNAL MEDICINE

## 2023-09-15 PROCEDURE — 3078F DIAST BP <80 MM HG: CPT | Performed by: INTERNAL MEDICINE

## 2023-09-15 PROCEDURE — 99213 OFFICE O/P EST LOW 20 MIN: CPT | Performed by: INTERNAL MEDICINE

## 2023-09-15 PROCEDURE — 3044F HG A1C LEVEL LT 7.0%: CPT | Performed by: INTERNAL MEDICINE

## 2023-09-15 NOTE — PROGRESS NOTES
"Chief Complaint   Patient presents with    Abnormal Calcium    Diabetes        HPI   Miller Em is a 63 y.o. male had concerns including Abnormal Calcium and Diabetes.      Patient underwent parathyroidectomy in the interim since last visit.  He did have interval labs with his PCP that showed normalization of serum calcium.  He did have follow-up with Dr. Emerson last week and had repeat labs that he has not heard back regarding these results.  He does report he had some hand cramps following surgery but states these have resolved.  He also notes that he was taking Tums, 2 tablets twice daily and was instructed to stay on this until he heard back regarding his labs from Dr. Emerson.  He did run out a couple of days ago and denies any numbness, tingling, cramps.    Regarding diabetes, patient reports he is currently taking 70/30 20 units twice daily.  He reports no values greater than 180 since last visit.    Patient reports tenderness and swelling of the right jaw.  He was seen at urgent treatment this morning and placed on antibiotics.  He is also to follow-up with his dentist regarding this.    The following portions of the patient's history were reviewed and updated as appropriate: allergies, current medications, and past social history.    Review of Systems   HENT:  Positive for facial swelling.    Neurological:  Negative for numbness.      /74 (BP Location: Right arm, Patient Position: Sitting, Cuff Size: Adult)   Pulse 96   Ht 175.3 cm (69\")   Wt 135 kg (298 lb)   SpO2 99%   BMI 44.01 kg/m²      Physical Exam      Constitutional:  well developed; well nourished  no acute distress  obese - Body mass index is 44.01 kg/m².   ENT/Thyroid: not examined   Eyes: Conjunctiva: clear   Respiratory:  breathing is unlabored  clear to auscultation bilaterally   Cardiovascular:  regular rate and rhythm   Chest:  Not performed.   Abdomen: Not performed.   : Not performed.   Musculoskeletal: Not " performed   Skin: not performed.   Neuro: mental status, speech normal   Psych: mood and affect are within normal limits       Labs/Imaging   Latest Reference Range & Units 09/01/23 11:27   Sodium 136 - 145 mmol/L 142   Potassium 3.5 - 5.2 mmol/L 4.3   Chloride 98 - 107 mmol/L 103   CO2 22.0 - 29.0 mmol/L 26.2   BUN 8 - 23 mg/dL 19   Creatinine 0.76 - 1.27 mg/dL 1.32 (H)   BUN/Creatinine Ratio 7.0 - 25.0  14.4   EGFR Result >60.0 mL/min/1.73 59.9 (L)   Glucose 65 - 99 mg/dL 100 (H)   Calcium 8.6 - 10.5 mg/dL 9.7   Alkaline Phosphatase 39 - 117 U/L 79   Total Protein 6.0 - 8.5 g/dL 6.9   Albumin 3.5 - 5.2 g/dL 4.7   A/G Ratio g/dL 2.1   AST (SGOT) 1 - 40 U/L 27   ALT (SGPT) 1 - 41 U/L 17   Total Bilirubin 0.0 - 1.2 mg/dL 0.3   Uric Acid 3.4 - 7.0 mg/dL 5.5   Hemoglobin A1C 4.80 - 5.60 % 6.60 (H)   TSH Baseline 0.270 - 4.200 uIU/mL 1.200   Free T4 0.93 - 1.70 ng/dL 1.65   (H): Data is abnormally high  (L): Data is abnormally low      Diagnoses and all orders for this visit:    1. Primary hyperparathyroidism (Primary)  Status post parathyroidectomy with Dr. Emerson  Patient was previously taking Tums until he ran out a few days ago.  He denies symptoms of hypocalcemia.  Calcium on September 1 was normal.  Patient was taking calcium at time of lab draw.  We discussed repeating labs now that he is not taking calcium but patient did not want to repeat labs today.  Patient had labs last week with Dr. Emerson which are not available for review.  We reached out to this office to obtain results.  Patient reports he will resume use of Tums until he hears from Dr. Emerson's office.  He will return to our clinic if he does not hear back regarding his recent labs within 1 to 2 weeks.  Discussed with patient that available data is suggestive of surgical cure.  If this is the case, he would need monitoring of serum calcium annually.  Patient voiced understanding.    2. Type 2 diabetes mellitus without complication, with long-term  current use of insulin  Most recent hemoglobin A1c 6.6%  Patient will continue premixed insulin 70/30 20 units twice daily.    Discussed with patient that if labs, once available,  confirm surgical cure of primary hyperparathyroidism he would not need routine follow-up in this clinic.  However, patient voiced preference to follow-up with this office.  We will plan for follow-up in 6 months, sooner if needed.       Return in about 6 months (around 3/15/2024) for Next scheduled follow up. The patient was instructed to contact the clinic with any interval questions or concerns.    Brianna Grove MD   Endocrinologist    Dictated Utilizing Dragon Dictation

## 2023-10-01 DIAGNOSIS — E03.9 ACQUIRED HYPOTHYROIDISM: ICD-10-CM

## 2023-10-01 DIAGNOSIS — E21.3 HYPERPARATHYROIDISM: ICD-10-CM

## 2023-10-01 DIAGNOSIS — E78.00 PURE HYPERCHOLESTEROLEMIA: ICD-10-CM

## 2023-10-01 DIAGNOSIS — I10 ESSENTIAL HYPERTENSION: ICD-10-CM

## 2023-10-02 RX ORDER — LOSARTAN POTASSIUM 100 MG/1
100 TABLET ORAL DAILY
Qty: 90 TABLET | Refills: 0 | Status: SHIPPED | OUTPATIENT
Start: 2023-10-02

## 2023-10-17 DIAGNOSIS — Z79.4 TYPE 2 DIABETES MELLITUS WITH OTHER CIRCULATORY COMPLICATION, WITH LONG-TERM CURRENT USE OF INSULIN: ICD-10-CM

## 2023-10-17 DIAGNOSIS — E11.59 TYPE 2 DIABETES MELLITUS WITH OTHER CIRCULATORY COMPLICATION, WITH LONG-TERM CURRENT USE OF INSULIN: ICD-10-CM

## 2023-12-28 DIAGNOSIS — I10 ESSENTIAL HYPERTENSION: ICD-10-CM

## 2023-12-28 RX ORDER — METOPROLOL SUCCINATE 50 MG/1
50 TABLET, EXTENDED RELEASE ORAL DAILY
Qty: 90 TABLET | Refills: 1 | Status: SHIPPED | OUTPATIENT
Start: 2023-12-28

## 2023-12-28 RX ORDER — ALLOPURINOL 300 MG/1
300 TABLET ORAL DAILY
Qty: 90 TABLET | Refills: 3 | Status: SHIPPED | OUTPATIENT
Start: 2023-12-28

## 2024-01-27 DIAGNOSIS — I10 ESSENTIAL HYPERTENSION: ICD-10-CM

## 2024-01-27 RX ORDER — ATORVASTATIN CALCIUM 20 MG/1
20 TABLET, FILM COATED ORAL
Qty: 30 TABLET | Refills: 5 | Status: SHIPPED | OUTPATIENT
Start: 2024-01-27

## 2024-03-23 DIAGNOSIS — E78.00 PURE HYPERCHOLESTEROLEMIA: ICD-10-CM

## 2024-03-23 DIAGNOSIS — E03.9 ACQUIRED HYPOTHYROIDISM: ICD-10-CM

## 2024-03-23 DIAGNOSIS — I10 BENIGN ESSENTIAL HYPERTENSION: ICD-10-CM

## 2024-03-23 DIAGNOSIS — E21.3 HYPERPARATHYROIDISM: ICD-10-CM

## 2024-03-23 DIAGNOSIS — R79.89 LOW VITAMIN D LEVEL: ICD-10-CM

## 2024-03-23 DIAGNOSIS — I10 ESSENTIAL HYPERTENSION: ICD-10-CM

## 2024-03-25 RX ORDER — LEVOTHYROXINE SODIUM 0.15 MG/1
150 TABLET ORAL DAILY
Qty: 30 TABLET | Refills: 5 | Status: SHIPPED | OUTPATIENT
Start: 2024-03-25

## 2024-03-25 RX ORDER — CHOLECALCIFEROL (VITAMIN D3) 1250 MCG
50000 CAPSULE ORAL
Qty: 12 CAPSULE | Refills: 4 | Status: SHIPPED | OUTPATIENT
Start: 2024-03-25

## 2024-03-25 RX ORDER — LOSARTAN POTASSIUM 100 MG/1
100 TABLET ORAL DAILY
Qty: 90 TABLET | Refills: 0 | Status: SHIPPED | OUTPATIENT
Start: 2024-03-25

## 2024-03-25 NOTE — TELEPHONE ENCOUNTER
Rx Refill Note  Requested Prescriptions     Pending Prescriptions Disp Refills    losartan (COZAAR) 100 MG tablet [Pharmacy Med Name: LOSARTAN 100MG TABLETS] 90 tablet 0     Sig: TAKE 1 TABLET BY MOUTH DAILY    Vitamin D3 1.25 MG (04550 UT) capsule [Pharmacy Med Name: VITAMIN D3 50,000 IU (ROLLY) CAP] 12 capsule 4     Sig: TAKE 1 CAPSULE BY MOUTH WEEKLY    levothyroxine (SYNTHROID, LEVOTHROID) 150 MCG tablet [Pharmacy Med Name: LEVOTHYROXINE 0.150MG (150MCG) TAB] 30 tablet      Sig: Take 1 tablet by mouth Daily.      Last office visit with prescribing clinician: 9/1/2023   Last telemedicine visit with prescribing clinician: Visit date not found   Next office visit with prescribing clinician: 3/23/2024       Karey Francois MA  03/25/24, 08:38 EDT

## 2024-03-25 NOTE — TELEPHONE ENCOUNTER
Rx Refill Note  Requested Prescriptions     Pending Prescriptions Disp Refills    levothyroxine (SYNTHROID, LEVOTHROID) 150 MCG tablet [Pharmacy Med Name: LEVOTHYROXINE 0.150MG (150MCG) TAB] 30 tablet      Sig: Take 1 tablet by mouth Daily.      Last office visit with prescribing clinician: 9/1/2023   Last telemedicine visit with prescribing clinician: Visit date not found   Next office visit with prescribing clinician: Visit date not found       Karey Francois MA  03/25/24, 08:38 EDT

## 2024-04-26 RX ORDER — TAMSULOSIN HYDROCHLORIDE 0.4 MG/1
1 CAPSULE ORAL DAILY
Qty: 90 CAPSULE | Refills: 3 | Status: SHIPPED | OUTPATIENT
Start: 2024-04-26

## 2024-06-05 DIAGNOSIS — I10 ESSENTIAL HYPERTENSION: ICD-10-CM

## 2024-06-05 DIAGNOSIS — E21.3 HYPERPARATHYROIDISM: ICD-10-CM

## 2024-06-05 DIAGNOSIS — E03.9 ACQUIRED HYPOTHYROIDISM: ICD-10-CM

## 2024-06-05 DIAGNOSIS — E78.00 PURE HYPERCHOLESTEROLEMIA: ICD-10-CM

## 2024-06-05 RX ORDER — LOSARTAN POTASSIUM 100 MG/1
100 TABLET ORAL DAILY
Qty: 90 TABLET | Refills: 0 | Status: SHIPPED | OUTPATIENT
Start: 2024-06-05

## 2024-06-05 RX ORDER — METOPROLOL SUCCINATE 50 MG/1
50 TABLET, EXTENDED RELEASE ORAL DAILY
Qty: 90 TABLET | Refills: 1 | Status: SHIPPED | OUTPATIENT
Start: 2024-06-05

## 2024-07-18 RX ORDER — PEN NEEDLE, DIABETIC 32GX 5/32"
NEEDLE, DISPOSABLE MISCELLANEOUS
Qty: 200 EACH | Refills: 3 | Status: SHIPPED | OUTPATIENT
Start: 2024-07-18

## 2024-08-09 DIAGNOSIS — I10 ESSENTIAL HYPERTENSION: ICD-10-CM

## 2024-08-09 RX ORDER — ATORVASTATIN CALCIUM 20 MG/1
20 TABLET, FILM COATED ORAL
Qty: 30 TABLET | Refills: 5 | Status: SHIPPED | OUTPATIENT
Start: 2024-08-09

## 2024-08-09 NOTE — TELEPHONE ENCOUNTER
Rx Refill Note  Requested Prescriptions     Pending Prescriptions Disp Refills    atorvastatin (LIPITOR) 20 MG tablet [Pharmacy Med Name: ATORVASTATIN 20MG TABLETS] 30 tablet 5     Sig: TAKE 1 TABLET BY MOUTH AT BEDTIME      Last office visit with prescribing clinician: 9/1/2023   Last telemedicine visit with prescribing clinician: Visit date not found   Next office visit with prescribing clinician: Visit date not found                         Would you like a call back once the refill request has been completed: [] Yes [] No    If the office needs to give you a call back, can they leave a voicemail: [] Yes [] No    Karey Francois MA  08/09/24, 14:30 EDT

## 2024-09-02 DIAGNOSIS — I10 ESSENTIAL HYPERTENSION: ICD-10-CM

## 2024-09-02 DIAGNOSIS — E21.3 HYPERPARATHYROIDISM: ICD-10-CM

## 2024-09-02 DIAGNOSIS — E03.9 ACQUIRED HYPOTHYROIDISM: ICD-10-CM

## 2024-09-02 DIAGNOSIS — E78.00 PURE HYPERCHOLESTEROLEMIA: ICD-10-CM

## 2024-09-03 RX ORDER — LOSARTAN POTASSIUM 100 MG/1
100 TABLET ORAL DAILY
Qty: 90 TABLET | Refills: 0 | Status: SHIPPED | OUTPATIENT
Start: 2024-09-03

## 2024-09-12 ENCOUNTER — OFFICE VISIT (OUTPATIENT)
Dept: INTERNAL MEDICINE | Facility: CLINIC | Age: 64
End: 2024-09-12
Payer: MEDICARE

## 2024-09-12 VITALS
WEIGHT: 313 LBS | HEART RATE: 95 BPM | RESPIRATION RATE: 16 BRPM | HEIGHT: 69 IN | BODY MASS INDEX: 46.36 KG/M2 | DIASTOLIC BLOOD PRESSURE: 76 MMHG | TEMPERATURE: 97 F | SYSTOLIC BLOOD PRESSURE: 108 MMHG | OXYGEN SATURATION: 98 %

## 2024-09-12 DIAGNOSIS — R79.9 ABNORMAL FINDING OF BLOOD CHEMISTRY, UNSPECIFIED: ICD-10-CM

## 2024-09-12 DIAGNOSIS — E21.3 HYPERPARATHYROIDISM: ICD-10-CM

## 2024-09-12 DIAGNOSIS — E03.9 ACQUIRED HYPOTHYROIDISM: ICD-10-CM

## 2024-09-12 DIAGNOSIS — M25.531 PAIN IN BOTH WRISTS: ICD-10-CM

## 2024-09-12 DIAGNOSIS — M25.532 PAIN IN BOTH WRISTS: ICD-10-CM

## 2024-09-12 DIAGNOSIS — Z12.11 COLON CANCER SCREENING: ICD-10-CM

## 2024-09-12 DIAGNOSIS — Z12.5 ENCOUNTER FOR SCREENING FOR MALIGNANT NEOPLASM OF PROSTATE: ICD-10-CM

## 2024-09-12 DIAGNOSIS — Z00.00 ROUTINE GENERAL MEDICAL EXAMINATION AT A HEALTH CARE FACILITY: Primary | ICD-10-CM

## 2024-09-12 DIAGNOSIS — G47.33 OSA (OBSTRUCTIVE SLEEP APNEA): ICD-10-CM

## 2024-09-12 DIAGNOSIS — I10 ESSENTIAL HYPERTENSION: ICD-10-CM

## 2024-09-12 DIAGNOSIS — E78.00 PURE HYPERCHOLESTEROLEMIA: ICD-10-CM

## 2024-09-12 PROCEDURE — G0439 PPPS, SUBSEQ VISIT: HCPCS | Performed by: INTERNAL MEDICINE

## 2024-09-12 PROCEDURE — 3078F DIAST BP <80 MM HG: CPT | Performed by: INTERNAL MEDICINE

## 2024-09-12 PROCEDURE — 96160 PT-FOCUSED HLTH RISK ASSMT: CPT | Performed by: INTERNAL MEDICINE

## 2024-09-12 PROCEDURE — 99397 PER PM REEVAL EST PAT 65+ YR: CPT | Performed by: INTERNAL MEDICINE

## 2024-09-12 PROCEDURE — 1125F AMNT PAIN NOTED PAIN PRSNT: CPT | Performed by: INTERNAL MEDICINE

## 2024-09-12 PROCEDURE — 99214 OFFICE O/P EST MOD 30 MIN: CPT | Performed by: INTERNAL MEDICINE

## 2024-09-12 PROCEDURE — 1170F FXNL STATUS ASSESSED: CPT | Performed by: INTERNAL MEDICINE

## 2024-09-12 PROCEDURE — 3074F SYST BP LT 130 MM HG: CPT | Performed by: INTERNAL MEDICINE

## 2024-09-12 RX ORDER — FUROSEMIDE 20 MG
1 TABLET ORAL DAILY
COMMUNITY
Start: 2024-06-24 | End: 2024-12-21

## 2024-09-12 NOTE — PATIENT INSTRUCTIONS
Tumeric 500 mg.  BID.     Fish oil 3 pills a day    Osteo bi flex 2 pills a day.    For wrist pain.

## 2024-09-12 NOTE — ASSESSMENT & PLAN NOTE
Lipid abnormalities are improving with treatment    Plan:  Continue same medication/s without change.      Discussed medication dosage, use, side effects, and goals of treatment in detail.    Counseled patient on lifestyle modifications to help control hyperlipidemia.     Patient Treatment Goals:   LDL goal is less than 70  LDL goal is under 100    Followup in 6 months.

## 2024-09-12 NOTE — PROGRESS NOTES
Subjective     Patient ID: Miller Em is a 64 y.o. male. Patient is here for management of multiple medical problems.     Chief Complaint   Patient presents with    Medicare Wellness-subsequent     History of Present Illness   Wellness    Htn    Hypothyroid.      The following portions of the patient's history were reviewed and updated as appropriate: allergies, current medications, past family history, past medical history, past social history, past surgical history and problem list.    Review of Systems    Current Outpatient Medications:     Accu-Chek FastClix Lancets misc, TEST DAILY FOR DM E11.9, Disp: 100 each, Rfl: 3    allopurinol (ZYLOPRIM) 300 MG tablet, TAKE 1 TABLET BY MOUTH DAILY, Disp: 90 tablet, Rfl: 3    aspirin 81 MG EC tablet, Take 1 tablet by mouth Daily., Disp: 30 tablet, Rfl: 11    atorvastatin (LIPITOR) 20 MG tablet, TAKE 1 TABLET BY MOUTH AT BEDTIME, Disp: 30 tablet, Rfl: 5    BD Pen Needle Agnes 2nd Gen 32G X 4 MM misc, USE TWICE DAILY WITH INSULIN, Disp: 200 each, Rfl: 3    fluticasone (Flonase) 50 MCG/ACT nasal spray, 2 sprays into the nostril(s) as directed by provider Daily., Disp: 16 g, Rfl: 11    furosemide (LASIX) 20 MG tablet, Take 1 tablet by mouth Daily., Disp: , Rfl:     glucose blood test strip, Patient tests blood sugar one time daily.  Diagnosis code E11.9, Disp: 100 each, Rfl: 3    Insulin NPH Isophane & Regular (NovoLIN 70/30 FlexPen) (70-30) 100 UNIT/ML suspension pen-injector, Inject 20 Units under the skin into the appropriate area as directed 3 (Three) Times a Day Before Meals. (Patient taking differently: Inject 20 Units under the skin into the appropriate area as directed 2 (Two) Times a Day.), Disp: 45 mL, Rfl: 3    latanoprost (XALATAN) 0.005 % ophthalmic solution, INSTILL 1 DROP INTO EACH EYE AT BEDTIME, Disp: , Rfl:     levothyroxine (SYNTHROID, LEVOTHROID) 150 MCG tablet, TAKE 1 TABLET BY MOUTH DAILY, Disp: 30 tablet, Rfl: 5    losartan (COZAAR) 100 MG  "tablet, TAKE 1 TABLET BY MOUTH DAILY, Disp: 90 tablet, Rfl: 0    metFORMIN (GLUCOPHAGE) 500 MG tablet, TAKE 1 TABLET BY MOUTH DAILY WITH BREAKFAST, Disp: 90 tablet, Rfl: 1    metoprolol succinate XL (TOPROL-XL) 50 MG 24 hr tablet, TAKE 1 TABLET BY MOUTH DAILY, Disp: 90 tablet, Rfl: 1    tamsulosin (FLOMAX) 0.4 MG capsule 24 hr capsule, TAKE 1 CAPSULE BY MOUTH DAILY, Disp: 90 capsule, Rfl: 3    Vitamin D3 1.25 MG (13894 UT) capsule, TAKE 1 CAPSULE BY MOUTH WEEKLY, Disp: 12 capsule, Rfl: 4    Objective      Blood pressure 108/76, pulse 95, temperature 97 °F (36.1 °C), resp. rate 16, height 175.3 cm (69\"), weight (!) 142 kg (313 lb), SpO2 98%.    Class 3 Severe Obesity (BMI >=40). Obesity-related health conditions include the following: obstructive sleep apnea, hypertension, and diabetes mellitus. Obesity is improving with treatment. BMI is is above average; BMI management plan is completed. We discussed portion control and increasing exercise.       Physical Exam     General Appearance:    Alert, cooperative, no distress, appears stated age   Head:    Normocephalic, without obvious abnormality, atraumatic   Eyes:    PERRL, conjunctiva/corneas clear, EOM's intact   Ears:    Normal TM's and external ear canals, both ears   Nose:   Nares normal, septum midline, mucosa normal, no drainage   or sinus tenderness   Throat:   Lips, mucosa, and tongue normal; teeth and gums normal   Neck:   Supple, symmetrical, trachea midline, no adenopathy;        thyroid:  No enlargement/tenderness/nodules; no carotid    bruit or JVD   Back:     Symmetric, no curvature, ROM normal, no CVA tenderness   Lungs:     Clear to auscultation bilaterally, respirations unlabored   Chest wall:    No tenderness or deformity   Heart:    Regular rate and rhythm, S1 and S2 normal, no murmur,        rub or gallop   Abdomen:     Soft, non-tender, bowel sounds active all four quadrants,     no masses, no organomegaly   Extremities:   Extremities normal, " atraumatic, no cyanosis or edema   Pulses:   2+ and symmetric all extremities   Skin:   Skin color, texture, turgor normal, no rashes or lesions   Lymph nodes:   Cervical, supraclavicular, and axillary nodes normal   Neurologic:   CNII-XII intact. Normal strength, sensation and reflexes       throughout      Results for orders placed or performed in visit on 08/15/23   TISSUE EXAM, [facility] ([facility])    Specimen: Tissue   Result Value Ref Range    Reference Lab Report       [facility]  [address] [place] [address]  Phone: [phone] or [phone]  Fax: [phone]  Leland Baker M.D., Medical Director    PATIENT NAME                           LABORATORY NO.  JOHNSON ENGLAND.              [id number]  [id number]                         AGE              SEX  SSN           CLIENT REF #  [facility]           63      1960  M    [id number]   [id number]    [address]              REQUESTING [place]     ATTENDING [place] [address]                TOAN EMERSON  DATE COLLECTED      DATE RECEIVED      DATE REPORTED  08/15/2023          08/15/2023         2023    DIAGNOSIS:  PARATHYROID GLAND, RIGHT INFERIOR:  Abnormal hypercellular parathyroid gland    COOKIE    CLINICAL HISTORY:  Primary hyperparathyroidism    SPECIMENS RECEIVED:  PARATHYROID GLAND, RIGHT INFERIOR    FROZEN SECTION INTERPRETATION:  Frozen section performed by Dr. Schwarz at [facility]  (refer to above for address).  Frozen section diagnosis:  Hypercellular  parathyroid gland.  Total time:  10 minutes.  Frozen section stains are acceptable.  The patient ID is verified and a verbal report was given to Dr. Emerson by Dr. Schwarz.    MICROSCOPIC DESCRIPTION:  Tissue blocks are prepared and slides are examined microscopically on all  specimens. See diagnosis for details.    Professional interpretation rendered by Lamberto CARTWRIGHT  "MIGUEL Schwarz, GETACHEW. at [facility], [facility], [address], [place] [address].    GROSS DESCRIPTION:  Specimen received in formalin labeled \"parathyroid-possible right inferior\" and  consists of a 1.8 x 1.0 x 0.8 cm portion of tan-brown nodular tissue.  A  representative section is frozen and resubmitted in cassette A1.  The remainder  of the specimen is further sectioned and submitted in cassettes A2-A3.  JTM    REVIEWED, DIAGNOSED AND ELECTRONICALLY  SIGNED BY:    Lamberto Schwarz M.D., GETACHEW.  CPT CODES:  51156, 64927           Assessment & Plan       [facility]  Pulmonary, Critical Care, and Sleep Medicine  Josue Jacques M.D.  [address]  [address]  Medical Office Building [address], [place]. [address].     Phone: [phone]  Fax: [phone]        POLYSOMNOGRAPHY INTERPRETATION  Date of Study: 09/01/2020     Patient Name: Miller Em   YOB: 1960        Study ordered by:  ODETTE Kaur     Diagnosis after study:  Obstructive Sleep Apnea.   Snoring  Mildly Poor Sleep Efficiency.  Event related hypoxia noted.      Impression:  This study reveals severe sleep apnea with an AHI of 59/hour.  The RIGO was worse in REM sleep. The AHI in REM was 64 vs. 57 in non-REM sleep  The RIGO was positional - Supine AHI - 62 events/hr. Side AHI - 48 events/hr.  The patient had decreased sleep efficiency in the lab. This may be attributable to the unfamiliarity of the lab environment (the 'first night effect').   Arousal index of 10/hour, was noted.   Event related hypoxemia was noted through out the night.      Recommendations:  Sleep hygiene measures should be counseled to the patient.  Patient's Body mass index is 46.37 kg/m². which suggests that weight loss maybe beneficial.  Avoid consuming caffeine, and/or other stimulants, within 4-6 hours of sleep time.   We will recommend the patient to be setup with empiric CPAP pressure of 8 cm. The patient may be scheduled for " a formal CPAP titration study afterwards.   Overnight Oximetry will be ordered, on CPAP, if clinically indicated. If overnight pulse oximetry suggests continued hypoxia, then he may need to undergo a titration study to identify optimal pressure and optimal oxygen supplementation.     Follow up:  Patient will need to follow up in 8-16 weeks with one of the providers in this office.         Pt was not able to get supplies. Has replaced.      Diagnoses and all orders for this visit:    1. Routine general medical examination at a health care facility (Primary)  -     Lipid Panel  -     PSA Screen  -     CBC & Differential  -     Vitamin B12  -     Comprehensive Metabolic Panel  -     TSH  -     T4, Free  -     Hemoglobin A1c  -     MicroAlbumin, Urine, Random - Urine, Clean Catch  -     Vitamin D,25-Hydroxy    2. Pure hypercholesterolemia  -     Lipid Panel  -     PSA Screen  -     CBC & Differential  -     Vitamin B12  -     Comprehensive Metabolic Panel  -     TSH  -     T4, Free  -     Hemoglobin A1c  -     MicroAlbumin, Urine, Random - Urine, Clean Catch  -     Vitamin D,25-Hydroxy    3. Acquired hypothyroidism  -     Lipid Panel  -     PSA Screen  -     CBC & Differential  -     Vitamin B12  -     Comprehensive Metabolic Panel  -     TSH  -     T4, Free  -     Hemoglobin A1c  -     MicroAlbumin, Urine, Random - Urine, Clean Catch  -     Vitamin D,25-Hydroxy    4. Essential hypertension  -     Lipid Panel  -     PSA Screen  -     CBC & Differential  -     Vitamin B12  -     Comprehensive Metabolic Panel  -     TSH  -     T4, Free  -     Hemoglobin A1c  -     MicroAlbumin, Urine, Random - Urine, Clean Catch  -     Vitamin D,25-Hydroxy    5. Hyperparathyroidism  -     Lipid Panel  -     PSA Screen  -     CBC & Differential  -     Vitamin B12  -     Comprehensive Metabolic Panel  -     TSH  -     T4, Free  -     Hemoglobin A1c  -     MicroAlbumin, Urine, Random - Urine, Clean Catch  -     Vitamin D,25-Hydroxy    6.  Encounter for screening for malignant neoplasm of prostate  -     PSA Screen    7. Abnormal finding of blood chemistry, unspecified  -     Hemoglobin A1c    8. RIGO (obstructive sleep apnea)  -     PAP Therapy    9. Pain in both wrists  -     Ambulatory Referral to Orthopedic Surgery    10. Colon cancer screening  -     Ambulatory Referral to Gastroenterology      Return in about 6 months (around 3/12/2025) for Recheck.          Patient Instructions   Tumeric 500 mg.  BID.     Fish oil 3 pills a day    Osteo bi flex 2 pills a day.    For wrist pain.       Praful Rapp MD    Assessment & Plan

## 2024-09-12 NOTE — PROGRESS NOTES
Subjective   The ABCs of the Annual Wellness Visit  Medicare Wellness Visit      Miller Em is a 64 y.o. patient who presents for a Medicare Wellness Visit.    The following portions of the patient's history were reviewed and   updated as appropriate: allergies, current medications, past family history, past medical history, past social history, past surgical history, and problem list.    Compared to one year ago, the patient's physical   health is the same.  Compared to one year ago, the patient's mental   health is the same.    Recent Hospitalizations:  He was not admitted to the hospital during the last year.     Current Medical Providers:  Patient Care Team:  Praful Rapp MD as PCP - General (Internal Medicine)  Brianna Grove MD as Consulting Physician (Endocrinology)    Outpatient Medications Prior to Visit   Medication Sig Dispense Refill    Accu-Chek FastClix Lancets misc TEST DAILY FOR DM E11.9 100 each 3    allopurinol (ZYLOPRIM) 300 MG tablet TAKE 1 TABLET BY MOUTH DAILY 90 tablet 3    aspirin 81 MG EC tablet Take 1 tablet by mouth Daily. 30 tablet 11    atorvastatin (LIPITOR) 20 MG tablet TAKE 1 TABLET BY MOUTH AT BEDTIME 30 tablet 5    BD Pen Needle Agnes 2nd Gen 32G X 4 MM misc USE TWICE DAILY WITH INSULIN 200 each 3    fluticasone (Flonase) 50 MCG/ACT nasal spray 2 sprays into the nostril(s) as directed by provider Daily. 16 g 11    furosemide (LASIX) 20 MG tablet Take 1 tablet by mouth Daily.      glucose blood test strip Patient tests blood sugar one time daily.  Diagnosis code E11.9 100 each 3    Insulin NPH Isophane & Regular (NovoLIN 70/30 FlexPen) (70-30) 100 UNIT/ML suspension pen-injector Inject 20 Units under the skin into the appropriate area as directed 3 (Three) Times a Day Before Meals. (Patient taking differently: Inject 20 Units under the skin into the appropriate area as directed 2 (Two) Times a Day.) 45 mL 3    latanoprost (XALATAN) 0.005 % ophthalmic solution  INSTILL 1 DROP INTO EACH EYE AT BEDTIME      levothyroxine (SYNTHROID, LEVOTHROID) 150 MCG tablet TAKE 1 TABLET BY MOUTH DAILY 30 tablet 5    losartan (COZAAR) 100 MG tablet TAKE 1 TABLET BY MOUTH DAILY 90 tablet 0    metFORMIN (GLUCOPHAGE) 500 MG tablet TAKE 1 TABLET BY MOUTH DAILY WITH BREAKFAST 90 tablet 1    metoprolol succinate XL (TOPROL-XL) 50 MG 24 hr tablet TAKE 1 TABLET BY MOUTH DAILY 90 tablet 1    tamsulosin (FLOMAX) 0.4 MG capsule 24 hr capsule TAKE 1 CAPSULE BY MOUTH DAILY 90 capsule 3    Vitamin D3 1.25 MG (89466 UT) capsule TAKE 1 CAPSULE BY MOUTH WEEKLY 12 capsule 4    levothyroxine (SYNTHROID, LEVOTHROID) 150 MCG tablet TAKE 1 TABLET BY MOUTH DAILY 30 tablet 5    oxyCODONE-acetaminophen (PERCOCET) 5-325 MG per tablet Take 1 tablet by mouth Every 4 (Four) Hours As Needed. 10 tablet 0     No facility-administered medications prior to visit.     No opioid medication identified on active medication list. I have reviewed chart for other potential  high risk medication/s and harmful drug interactions in the elderly.      Aspirin is on active medication list. Aspirin use is indicated based on review of current medical condition/s. Pros and cons of this therapy have been discussed today. Benefits of this medication outweigh potential harm.  Patient has been encouraged to continue taking this medication.  .      Patient Active Problem List   Diagnosis    Benign essential hypertension    Benign prostatic hypertrophy    Gout    Hepatitis    Hyperlipidemia    Hyperparathyroidism    Hypertension    Hypothyroidism    Hypoxia    RIGO on CPAP    RLS (restless legs syndrome)    Diabetes mellitus without complication    Vitamin D deficiency    Hypercalcemia    Morbid obesity    Precordial pain    Diabetic ketoacidosis without coma associated with diabetes mellitus due to underlying condition     Advance Care Planning Advance Directive is not on file.  ACP discussion was held with the patient during this visit.  "Patient does not have an advance directive, declines further assistance.            Objective   Vitals:    24 0857   BP: 108/76   Pulse: 95   Resp: 16   Temp: 97 °F (36.1 °C)   SpO2: 98%   Weight: (!) 142 kg (313 lb)   Height: 175.3 cm (69\")   PainSc:   4       Estimated body mass index is 46.22 kg/m² as calculated from the following:    Height as of this encounter: 175.3 cm (69\").    Weight as of this encounter: 142 kg (313 lb).    Class 3 Severe Obesity (BMI >=40). Obesity-related health conditions include the following: obstructive sleep apnea, hypertension, and diabetes mellitus. Obesity is improving with treatment. BMI is is above average; BMI management plan is completed. We discussed portion control and increasing exercise.       Does the patient have evidence of cognitive impairment? No                                                                                                Health  Risk Assessment    Smoking Status:  Social History     Tobacco Use   Smoking Status Former    Current packs/day: 0.00    Average packs/day: 0.3 packs/day for 5.2 years (1.3 ttl pk-yrs)    Types: Cigarettes    Start date: 2003    Quit date: 2008    Years since quittin.7    Passive exposure: Past   Smokeless Tobacco Never   Tobacco Comments    on and off x 10-15 years 1 pack per week     Alcohol Consumption:  Social History     Substance and Sexual Activity   Alcohol Use Yes    Alcohol/week: 6.0 standard drinks of alcohol    Types: 3 Cans of beer, 3 Shots of liquor per week    Comment: 1-2 wkly       Fall Risk Screen  MARCIA Fall Risk Assessment was completed, and patient is at LOW risk for falls.Assessment completed on:2024    Depression Screenin/12/2024     9:08 AM   PHQ-2/PHQ-9 Depression Screening   Little Interest or Pleasure in Doing Things 0-->not at all   Feeling Down, Depressed or Hopeless 0-->not at all   PHQ-9: Brief Depression Severity Measure Score 0     Health Habits and " Functional and Cognitive Screenin/12/2024     9:09 AM   Functional & Cognitive Status   Do you have difficulty preparing food and eating? No   Do you have difficulty bathing yourself, getting dressed or grooming yourself? No   Do you have difficulty using the toilet? No   Do you have difficulty moving around from place to place? No   Do you have trouble with steps or getting out of a bed or a chair? No   Current Diet Well Balanced Diet   Dental Exam Up to date   Eye Exam Up to date   Exercise (times per week) 1 times per week   Current Exercises Include Yard Work;Walking;Light Weights   Do you need help using the phone?  No   Are you deaf or do you have serious difficulty hearing?  No   Do you need help to go to places out of walking distance? No   Do you need help shopping? No   Do you need help preparing meals?  No   Do you need help with housework?  No   Do you need help with laundry? No   Do you need help taking your medications? No   Do you need help managing money? No   Do you ever drive or ride in a car without wearing a seat belt? No   Have you felt unusual stress, anger or loneliness in the last month? No   Who do you live with? Alone   If you need help, do you have trouble finding someone available to you? No   Have you been bothered in the last four weeks by sexual problems? No   Do you have difficulty concentrating, remembering or making decisions? No           Age-appropriate Screening Schedule:  Refer to the list below for future screening recommendations based on patient's age, sex and/or medical conditions. Orders for these recommended tests are listed in the plan section. The patient has been provided with a written plan.    Health Maintenance List  Health Maintenance   Topic Date Due    Pneumococcal Vaccine 65+ (1 of 2 - PCV) Never done    Hepatitis B (1 of 3 - Risk 3-dose series) Never done    DIABETIC FOOT EXAM  2019    COLORECTAL CANCER SCREENING  2021    DIABETIC EYE EXAM   "11/15/2022    LIPID PANEL  12/16/2023    HEMOGLOBIN A1C  03/01/2024    INFLUENZA VACCINE  08/01/2024    ZOSTER VACCINE (2 of 2) 09/12/2024 (Originally 8/28/2023)    COVID-19 Vaccine (5 - 2023-24 season) 09/14/2024 (Originally 9/1/2024)    URINE MICROALBUMIN  06/24/2025    ANNUAL WELLNESS VISIT  09/12/2025    BMI FOLLOWUP  09/12/2025    TDAP/TD VACCINES (2 - Td or Tdap) 07/17/2028    HEPATITIS C SCREENING  Completed    AAA SCREEN (ONE-TIME)  Completed                                                                                                                                                CMS Preventative Services Quick Reference  Risk Factors Identified During Encounter  Fall Risk-High or Moderate: Discussed Fall Prevention in the home and Information on Fall Prevention Shared in After Visit Summary    The above risks/problems have been discussed with the patient.  Pertinent information has been shared with the patient in the After Visit Summary.  An After Visit Summary and PPPS were made available to the patient.    Follow Up:   Next Medicare Wellness visit to be scheduled in 1 year.         Additional E&M Note during same encounter follows:  Patient has additional, significant, and separately identifiable condition(s)/problem(s) that require work above and beyond the Medicare Wellness Visit     Chief Complaint  Medicare Wellness-subsequent    Subjective   HPI  Miller is also being seen today for an annual adult preventative physical exam.                 Objective   Vital Signs:  /76   Pulse 95   Temp 97 °F (36.1 °C)   Resp 16   Ht 175.3 cm (69\")   Wt (!) 142 kg (313 lb)   SpO2 98%   BMI 46.22 kg/m²   Physical Exam    The following data was reviewed by: Praful Rapp MD on 09/12/2024:        Assessment and Plan Additional age appropriate preventative wellness advice topics were discussed during today's preventative wellness exam(some topics already addressed during AWV portion of the note " above):    Physical Activity: Advised cardiovascular activity 150 minutes per week as tolerated. (example brisk walk for 30 minutes, 5 days a week).     Nutrition: Discussed nutrition plan with patient. Information shared in after visit summary. Goal is for a well balanced diet to enhance overall health.     Healthy Weight: Discussed current and goal BMI with patient. Steps to attain this goal discussed. Information shared in after visit summary.              Pure hypercholesterolemia   Lipid abnormalities are improving with treatment    Plan:  Continue same medication/s without change.      Discussed medication dosage, use, side effects, and goals of treatment in detail.    Counseled patient on lifestyle modifications to help control hyperlipidemia.     Patient Treatment Goals:   LDL goal is less than 70  LDL goal is under 100    Followup in 6 months.  Acquired hypothyroidism    Essential hypertension  Hypertension is stable and controlled  Continue current treatment regimen.  Blood pressure will be reassessed in 6 months.  Hyperparathyroidism    Routine general medical examination at a health care facility    Encounter for screening for malignant neoplasm of prostate    Abnormal finding of blood chemistry, unspecified    RIGO (obstructive sleep apnea)    Pain in both wrists    Colon cancer screening    Orders Placed This Encounter   Procedures    PAP Therapy     Order Specific Question:   Equipment:     Answer:   PAP Supplies Only     Order Specific Question:   PAP Tubing (1 per 3 months)     Answer:    6' Tubing with integrated heating element     Order Specific Question:   PAP Mask (1 per 3 months)     Answer:   Mask Fitting with DME Company     Order Specific Question:   PAP Accessories     Answer:    Water Chamber for PAP Humidifier (1 per 6 month) &  Filter PAP Disposable (2 per month) &  Filter PAP Non-Disposable (1 per 6 months) &  Chinstrap to be used with PAP (1 per 6 months) &   Headgear to be used with PAP (1 per 6 mo)     Order Specific Question:   Does the patient have Obstructive Sleep Apnea or other qualifying diagnosis for PAP ordered?     Answer:   No     Order Specific Question:   Does the patient require humidification?     Answer:   No     Order Specific Question:   If ordering a BiPAP for RIGO a CPAP has been tried and/or ruled out?     Answer:   Does not apply     Order Specific Question:   The patient requires a PAP Device & continued use of Supplies to administer effective PAP therapy at the frequency of use ordered above     Answer:   Yes     Order Specific Question:   Initial Supplies and refills authorized for 12 months?     Answer:   Yes     Order Specific Question:   Length of Need     Answer:   99 Months = Lifetime    Lipid Panel     Order Specific Question:   Release to patient     Answer:   Routine Release [[id number]]    PSA Screen     Order Specific Question:   Release to patient     Answer:   Routine Release [[id number]]    Vitamin B12     Order Specific Question:   Release to patient     Answer:   Routine Release [[id number]]    Comprehensive Metabolic Panel     Order Specific Question:   Release to patient     Answer:   Routine Release [[id number]]    TSH     Order Specific Question:   Release to patient     Answer:   Routine Release [[id number]]    T4, Free     Order Specific Question:   Release to patient     Answer:   Routine Release [[id number]]    Hemoglobin A1c     Order Specific Question:   Release to patient     Answer:   Routine Release [[id number]]    MicroAlbumin, Urine, Random - Urine, Clean Catch     Order Specific Question:   Release to patient     Answer:   Routine Release [[id number]]    Vitamin D,25-Hydroxy     Order Specific Question:   Release to patient     Answer:   Routine Release [[id number]]    Ambulatory Referral to Orthopedic Surgery     Referral Priority:   Routine     Referral Type:   Consultation     Referral Reason:   Specialty  Services Required     Referred to Provider:   Brianna Aguilera MD     Requested Specialty:   Orthopedic Surgery     Number of Visits Requested:   1    Ambulatory Referral to Gastroenterology     Referral Priority:   Routine     Referral Type:   Consultation     Referral Reason:   Specialty Services Required     Referred to Provider:   Morris Serrano MD     Requested Specialty:   Gastroenterology     Number of Visits Requested:   1    CBC & Differential     Order Specific Question:   Manual Differential     Answer:   No     Order Specific Question:   Release to patient     Answer:   Routine Release [[id number]]           I spent 12   minutes caring for Miller on this date of service. This time includes time spent by me in the following activities:preparing for the visit and reviewing tests  Follow Up   Return in about 6 months (around 3/12/2025) for Recheck.  Patient was given instructions and counseling regarding his condition or for health maintenance advice. Please see specific information pulled into the AVS if appropriate.

## 2024-09-17 LAB
25(OH)D3+25(OH)D2 SERPL-MCNC: 48 NG/ML (ref 30–100)
ALBUMIN SERPL-MCNC: 4.4 G/DL (ref 3.5–5.2)
ALBUMIN/GLOB SERPL: 2 G/DL
ALP SERPL-CCNC: 70 U/L (ref 39–117)
ALT SERPL-CCNC: 25 U/L (ref 1–41)
AST SERPL-CCNC: 26 U/L (ref 1–40)
BASOPHILS # BLD AUTO: 0.06 10*3/MM3 (ref 0–0.2)
BASOPHILS NFR BLD AUTO: 0.9 % (ref 0–1.5)
BILIRUB SERPL-MCNC: 0.3 MG/DL (ref 0–1.2)
BUN SERPL-MCNC: 17 MG/DL (ref 8–23)
BUN/CREAT SERPL: 10 (ref 7–25)
CALCIUM SERPL-MCNC: 9.5 MG/DL (ref 8.6–10.5)
CHLORIDE SERPL-SCNC: 103 MMOL/L (ref 98–107)
CHOLEST SERPL-MCNC: 134 MG/DL (ref 0–200)
CO2 SERPL-SCNC: 24.8 MMOL/L (ref 22–29)
CREAT SERPL-MCNC: 1.7 MG/DL (ref 0.76–1.27)
EGFRCR SERPLBLD CKD-EPI 2021: 43.9 ML/MIN/1.73
EOSINOPHIL # BLD AUTO: 0.21 10*3/MM3 (ref 0–0.4)
EOSINOPHIL NFR BLD AUTO: 3.1 % (ref 0.3–6.2)
ERYTHROCYTE [DISTWIDTH] IN BLOOD BY AUTOMATED COUNT: 17.3 % (ref 12.3–15.4)
GLOBULIN SER CALC-MCNC: 2.2 GM/DL
GLUCOSE SERPL-MCNC: 124 MG/DL (ref 65–99)
HBA1C MFR BLD: 7.2 % (ref 4.8–5.6)
HCT VFR BLD AUTO: 47.1 % (ref 37.5–51)
HDLC SERPL-MCNC: 41 MG/DL (ref 40–60)
HGB BLD-MCNC: 14.7 G/DL (ref 13–17.7)
IMM GRANULOCYTES # BLD AUTO: 0.03 10*3/MM3 (ref 0–0.05)
IMM GRANULOCYTES NFR BLD AUTO: 0.4 % (ref 0–0.5)
LDLC SERPL CALC-MCNC: 70 MG/DL (ref 0–100)
LYMPHOCYTES # BLD AUTO: 3.11 10*3/MM3 (ref 0.7–3.1)
LYMPHOCYTES NFR BLD AUTO: 45.2 % (ref 19.6–45.3)
MCH RBC QN AUTO: 27 PG (ref 26.6–33)
MCHC RBC AUTO-ENTMCNC: 31.2 G/DL (ref 31.5–35.7)
MCV RBC AUTO: 86.6 FL (ref 79–97)
MICROALBUMIN UR-MCNC: 31.3 UG/ML
MONOCYTES # BLD AUTO: 0.77 10*3/MM3 (ref 0.1–0.9)
MONOCYTES NFR BLD AUTO: 11.2 % (ref 5–12)
NEUTROPHILS # BLD AUTO: 2.7 10*3/MM3 (ref 1.7–7)
NEUTROPHILS NFR BLD AUTO: 39.2 % (ref 42.7–76)
NRBC BLD AUTO-RTO: 0 /100 WBC (ref 0–0.2)
PLATELET # BLD AUTO: 202 10*3/MM3 (ref 140–450)
POTASSIUM SERPL-SCNC: 4.8 MMOL/L (ref 3.5–5.2)
PROT SERPL-MCNC: 6.6 G/DL (ref 6–8.5)
PSA SERPL-MCNC: 3.16 NG/ML (ref 0–4)
RBC # BLD AUTO: 5.44 10*6/MM3 (ref 4.14–5.8)
SODIUM SERPL-SCNC: 140 MMOL/L (ref 136–145)
T4 FREE SERPL-MCNC: 1.48 NG/DL (ref 0.92–1.68)
TRIGL SERPL-MCNC: 132 MG/DL (ref 0–150)
TSH SERPL DL<=0.005 MIU/L-ACNC: 1.28 UIU/ML (ref 0.27–4.2)
VIT B12 SERPL-MCNC: 394 PG/ML (ref 211–946)
VLDLC SERPL CALC-MCNC: 23 MG/DL (ref 5–40)
WBC # BLD AUTO: 6.88 10*3/MM3 (ref 3.4–10.8)

## 2024-09-17 RX ORDER — CHOLECALCIFEROL (VITAMIN D3) 125 MCG
1 CAPSULE ORAL DAILY
Qty: 90 TABLET | Refills: 3 | Status: SHIPPED | OUTPATIENT
Start: 2024-09-17 | End: 2026-03-18

## 2024-09-23 ENCOUNTER — OFFICE VISIT (OUTPATIENT)
Dept: ORTHOPEDIC SURGERY | Facility: CLINIC | Age: 64
End: 2024-09-23
Payer: MEDICARE

## 2024-09-23 VITALS — BODY MASS INDEX: 46.22 KG/M2 | HEIGHT: 69 IN | DIASTOLIC BLOOD PRESSURE: 90 MMHG | SYSTOLIC BLOOD PRESSURE: 140 MMHG

## 2024-09-23 DIAGNOSIS — M25.531 BILATERAL WRIST PAIN: Primary | ICD-10-CM

## 2024-09-23 DIAGNOSIS — M19.039 ARTHRITIS OF WRIST: ICD-10-CM

## 2024-09-23 DIAGNOSIS — M25.532 BILATERAL WRIST PAIN: Primary | ICD-10-CM

## 2024-09-23 PROCEDURE — 99204 OFFICE O/P NEW MOD 45 MIN: CPT | Performed by: STUDENT IN AN ORGANIZED HEALTH CARE EDUCATION/TRAINING PROGRAM

## 2024-09-23 PROCEDURE — 3077F SYST BP >= 140 MM HG: CPT | Performed by: STUDENT IN AN ORGANIZED HEALTH CARE EDUCATION/TRAINING PROGRAM

## 2024-09-23 PROCEDURE — 1159F MED LIST DOCD IN RCRD: CPT | Performed by: STUDENT IN AN ORGANIZED HEALTH CARE EDUCATION/TRAINING PROGRAM

## 2024-09-23 PROCEDURE — 3080F DIAST BP >= 90 MM HG: CPT | Performed by: STUDENT IN AN ORGANIZED HEALTH CARE EDUCATION/TRAINING PROGRAM

## 2024-09-23 PROCEDURE — 1160F RVW MEDS BY RX/DR IN RCRD: CPT | Performed by: STUDENT IN AN ORGANIZED HEALTH CARE EDUCATION/TRAINING PROGRAM

## 2024-09-23 RX ORDER — METHYLPREDNISOLONE 4 MG
1 TABLET, DOSE PACK ORAL DAILY
Qty: 21 TABLET | Refills: 0 | Status: SHIPPED | OUTPATIENT
Start: 2024-09-23

## 2024-11-01 RX ORDER — HUMAN INSULIN 100 [IU]/ML
INJECTION, SUSPENSION SUBCUTANEOUS
Qty: 45 ML | Refills: 3 | Status: SHIPPED | OUTPATIENT
Start: 2024-11-01

## 2024-12-17 DIAGNOSIS — E78.00 PURE HYPERCHOLESTEROLEMIA: ICD-10-CM

## 2024-12-17 DIAGNOSIS — E21.3 HYPERPARATHYROIDISM: ICD-10-CM

## 2024-12-17 DIAGNOSIS — E03.9 ACQUIRED HYPOTHYROIDISM: ICD-10-CM

## 2024-12-17 DIAGNOSIS — I10 ESSENTIAL HYPERTENSION: ICD-10-CM

## 2024-12-17 RX ORDER — LEVOTHYROXINE SODIUM 150 UG/1
150 TABLET ORAL DAILY
Qty: 90 TABLET | Refills: 3 | Status: SHIPPED | OUTPATIENT
Start: 2024-12-17

## 2024-12-17 RX ORDER — LOSARTAN POTASSIUM 100 MG/1
100 TABLET ORAL DAILY
Qty: 90 TABLET | Refills: 3 | Status: SHIPPED | OUTPATIENT
Start: 2024-12-17

## 2024-12-17 RX ORDER — LEVOTHYROXINE SODIUM 150 UG/1
150 TABLET ORAL DAILY
Qty: 30 TABLET | OUTPATIENT
Start: 2024-12-17

## 2024-12-17 RX ORDER — METOPROLOL SUCCINATE 50 MG/1
50 TABLET, EXTENDED RELEASE ORAL DAILY
Qty: 90 TABLET | Refills: 3 | Status: SHIPPED | OUTPATIENT
Start: 2024-12-17

## 2024-12-17 RX ORDER — ALLOPURINOL 300 MG/1
300 TABLET ORAL DAILY
Qty: 90 TABLET | Refills: 3 | Status: SHIPPED | OUTPATIENT
Start: 2024-12-17

## 2025-01-10 ENCOUNTER — OFFICE VISIT (OUTPATIENT)
Dept: GASTROENTEROLOGY | Facility: CLINIC | Age: 65
End: 2025-01-10
Payer: MEDICARE

## 2025-01-10 VITALS
DIASTOLIC BLOOD PRESSURE: 96 MMHG | BODY MASS INDEX: 47.26 KG/M2 | HEART RATE: 90 BPM | SYSTOLIC BLOOD PRESSURE: 138 MMHG | WEIGHT: 315 LBS | OXYGEN SATURATION: 98 %

## 2025-01-10 DIAGNOSIS — R49.0 HOARSENESS: Primary | ICD-10-CM

## 2025-01-10 DIAGNOSIS — Z12.11 COLON CANCER SCREENING: ICD-10-CM

## 2025-01-10 DIAGNOSIS — R09.A2 GLOBUS SENSATION: ICD-10-CM

## 2025-01-10 NOTE — PROGRESS NOTES
New Patient Consult      Date: 01/10/2025   Patient Name: Miller Em  MRN: [id number]  : 1960     Referring Physician: Praful Rapp MD    Chief Complaint   Patient presents with    Colon Cancer Screening       History of Present Illness: Miller Em is a 64 y.o. male who is here today to establish care with Gastroenterology for colon cancer screening.  Last colonoscopy, and upper endoscopy over a decade ago.  He is complaining of some degree of hoarseness, and a globus sensation.  Also had a dry cough, which has persisted despite stopping the lisinopril which was thought to be a potential culprit.  Does not have typical acid reflux, but I wonder if she is having silent GERD.    Subjective      Past Medical History:   Diagnosis Date    Allergic floxin,lisinipril    Arthritis     Benign prostatic hyperplasia     Cataract     Diabetes     Diabetes mellitus     Gout     Headache, migraine     Headache, tension-type     Hyperlipidemia     Hypertension     Impaired mobility     Obesity     Sleep apnea     Tear of meniscus of knee     Thyroid condition     Vitamin D deficiency        Past Surgical History:   Procedure Laterality Date    COLONOSCOPY      EYE SURGERY      Cataract    KNEE ARTHROSCOPY      THYROID SURGERY         Family History   Problem Relation Age of Onset    Stroke Other     Hyperlipidemia Other     Stroke Father     Hyperlipidemia Brother        Social History     Socioeconomic History    Marital status: Single   Tobacco Use    Smoking status: Former     Current packs/day: 0.00     Average packs/day: 0.3 packs/day for 5.2 years (1.3 ttl pk-yrs)     Types: Cigarettes     Start date: 2003     Quit date: 2008     Years since quittin.0     Passive exposure: Past    Smokeless tobacco: Never    Tobacco comments:     on and off x 10-15 years 1 pack per week   Vaping Use    Vaping status: Never Used   Substance and Sexual Activity    Alcohol use: Yes      Alcohol/week: 6.0 standard drinks of alcohol     Types: 3 Cans of beer, 3 Shots of liquor per week     Comment: 1-2 wkly    Drug use: No    Sexual activity: Yes     Partners: Female     Birth control/protection: Condom         Current Outpatient Medications:     Accu-Chek FastClix Lancets misc, TEST DAILY FOR DM E11.9, Disp: 100 each, Rfl: 3    allopurinol (ZYLOPRIM) 300 MG tablet, TAKE 1 TABLET BY MOUTH DAILY, Disp: 90 tablet, Rfl: 3    aspirin 81 MG EC tablet, Take 1 tablet by mouth Daily., Disp: 30 tablet, Rfl: 11    atorvastatin (LIPITOR) 20 MG tablet, TAKE 1 TABLET BY MOUTH AT BEDTIME, Disp: 30 tablet, Rfl: 5    B-12, Methylcobalamin, 1000 MCG sublingual tablet, Place 1 tablet under the tongue Daily., Disp: 90 tablet, Rfl: 3    BD Pen Needle Agnes 2nd Gen 32G X 4 MM misc, USE TWICE DAILY WITH INSULIN, Disp: 200 each, Rfl: 3    fluticasone (Flonase) 50 MCG/ACT nasal spray, 2 sprays into the nostril(s) as directed by provider Daily., Disp: 16 g, Rfl: 11    glucose blood test strip, Patient tests blood sugar one time daily.  Diagnosis code E11.9, Disp: 100 each, Rfl: 3    latanoprost (XALATAN) 0.005 % ophthalmic solution, INSTILL 1 DROP INTO EACH EYE AT BEDTIME, Disp: , Rfl:     levothyroxine (SYNTHROID, LEVOTHROID) 150 MCG tablet, TAKE 1 TABLET BY MOUTH DAILY, Disp: 90 tablet, Rfl: 3    losartan (COZAAR) 100 MG tablet, TAKE 1 TABLET BY MOUTH DAILY, Disp: 90 tablet, Rfl: 3    metFORMIN (GLUCOPHAGE) 500 MG tablet, TAKE 1 TABLET BY MOUTH DAILY WITH BREAKFAST, Disp: 90 tablet, Rfl: 1    metoprolol succinate XL (TOPROL-XL) 50 MG 24 hr tablet, TAKE 1 TABLET BY MOUTH DAILY, Disp: 90 tablet, Rfl: 3    NovoLIN 70/30 FlexPen (70-30) 100 UNIT/ML suspension pen-injector, ADMINISTER 20 UNITS UNDER THE SKIN THREE TIMES DAILY BEFORE MEALS AS DIRECTED, Disp: 45 mL, Rfl: 3    tamsulosin (FLOMAX) 0.4 MG capsule 24 hr capsule, TAKE 1 CAPSULE BY MOUTH DAILY, Disp: 90 capsule, Rfl: 3    Vitamin D3 1.25 MG (41374 UT) capsule, TAKE 1  CAPSULE BY MOUTH WEEKLY, Disp: 12 capsule, Rfl: 4    furosemide (LASIX) 20 MG tablet, Take 1 tablet by mouth Daily., Disp: , Rfl:     methylPREDNISolone (MEDROL) 4 MG dose pack, Take 1 tablet by mouth Daily. Use as directed by package instructions (Patient not taking: Reported on 1/10/2025), Disp: 21 tablet, Rfl: 0    polyethylene glycol (GoLYTELY) 236 g solution, Take 4,000 mL by mouth 1 (One) Time for 1 dose. Please see prep instructions from office., Disp: 4000 mL, Rfl: 0    Allergies   Allergen Reactions    Floxin [Ofloxacin] Hives    Lisinopril Cough and Other (See Comments)       Review of Systems   Gastrointestinal:  Positive for constipation and diarrhea. Negative for abdominal distention, abdominal pain, anal bleeding, blood in stool, nausea, rectal pain, vomiting, GERD and indigestion.       The following portions of the patient's history were reviewed and updated as appropriate: allergies, current medications, past family history, past medical history, past social history, past surgical history and problem list.    Objective     Physical Exam:  Vitals:    01/10/25 0933   BP: 138/96   Pulse: 90   SpO2: 98%   Weight: (!) 145 kg (320 lb)       Physical Exam  Constitutional:       General: He is not in acute distress.     Appearance: Normal appearance.   HENT:      Head: Normocephalic and atraumatic.   Eyes:      General: No scleral icterus.     Conjunctiva/sclera: Conjunctivae normal.   Cardiovascular:      Rate and Rhythm: Normal rate.   Pulmonary:      Effort: Pulmonary effort is normal. No respiratory distress.   Skin:     Coloration: Skin is not jaundiced or pale.   Neurological:      General: No focal deficit present.      Mental Status: He is alert and oriented to person, place, and time.   Psychiatric:         Mood and Affect: Mood normal.         Behavior: Behavior normal.         Results Review:   I have reviewed the patient's new clinical and imaging results and agree with the interpretation.      No visits with results within 90 Day(s) from this visit.   Latest known visit with results is:   Office Visit on 09/12/2024   Component Date Value Ref Range Status    Total Cholesterol 09/16/2024 134  0 - 200 mg/dL Final    Comment: Cholesterol Reference Ranges  (U.S. Department of Health and Human Services ATP III  Classifications)  Desirable          <200 mg/dL  Borderline High    200-239 mg/dL  High Risk          >240 mg/dL  Triglyceride Reference Ranges  (U.S. Department of Health and Human Services ATP III  Classifications)  Normal           <150 mg/dL  Borderline High  150-199 mg/dL  High             200-499 mg/dL  Very High        >500 mg/dL  HDL Reference Ranges  (U.S. Department of Health and Human Services ATP III  Classifications)  Low     <40 mg/dl (major risk factor for CHD)  High    >60 mg/dl ('negative' risk factor for CHD)  LDL Reference Ranges  (U.S. Department of Health and Human Services ATP III  Classifications)  Optimal          <100 mg/dL  Near Optimal     100-129 mg/dL  Borderline High  130-159 mg/dL  High             160-189 mg/dL  Very High        >189 mg/dL      Triglycerides 09/16/2024 132  0 - 150 mg/dL Final    HDL Cholesterol 09/16/2024 41  40 - 60 mg/dL Final    VLDL Cholesterol Paulino 09/16/2024 23  5 - 40 mg/dL Final    LDL Chol Calc (NIH) 09/16/2024 70  0 - 100 mg/dL Final    PSA 09/16/2024 3.160  0.000 - 4.000 ng/mL Final    Comment: Testing Method: [employer] Electrochemiluminescence  Immunoassay(ECLIA)  Values obtained with different assay methods or kits cannot  be used interchangeably.      WBC 09/16/2024 6.88  3.40 - 10.80 10*3/mm3 Final    RBC 09/16/2024 5.44  4.14 - 5.80 10*6/mm3 Final    Hemoglobin 09/16/2024 14.7  13.0 - 17.7 g/dL Final    Hematocrit 09/16/2024 47.1  37.5 - 51.0 % Final    MCV 09/16/2024 86.6  79.0 - 97.0 fL Final    MCH 09/16/2024 27.0  26.6 - 33.0 pg Final    MCHC 09/16/2024 31.2 (L)  31.5 - 35.7 g/dL Final    RDW 09/16/2024 17.3 (H)  12.3 - 15.4  % Final    Platelets 09/16/2024 202  140 - 450 10*3/mm3 Final    Neutrophil Rel % 09/16/2024 39.2 (L)  42.7 - 76.0 % Final    Lymphocyte Rel % 09/16/2024 45.2  19.6 - 45.3 % Final    Monocyte Rel % 09/16/2024 11.2  5.0 - 12.0 % Final    Eosinophil Rel % 09/16/2024 3.1  0.3 - 6.2 % Final    Basophil Rel % 09/16/2024 0.9  0.0 - 1.5 % Final    Neutrophils Absolute 09/16/2024 2.70  1.70 - 7.00 10*3/mm3 Final    Lymphocytes Absolute 09/16/2024 3.11 (H)  0.70 - 3.10 10*3/mm3 Final    Monocytes Absolute 09/16/2024 0.77  0.10 - 0.90 10*3/mm3 Final    Eosinophils Absolute 09/16/2024 0.21  0.00 - 0.40 10*3/mm3 Final    Basophils Absolute 09/16/2024 0.06  0.00 - 0.20 10*3/mm3 Final    Immature Granulocyte Rel % 09/16/2024 0.4  0.0 - 0.5 % Final    Immature Grans Absolute 09/16/2024 0.03  0.00 - 0.05 10*3/mm3 Final    nRBC 09/16/2024 0.0  0.0 - 0.2 /100 WBC Final    Vitamin B-12 09/16/2024 394  211 - 946 pg/mL Final    Results may be falsely increased if patient taking Biotin.    Glucose 09/16/2024 124 (H)  65 - 99 mg/dL Final    BUN 09/16/2024 17  8 - 23 mg/dL Final    Creatinine 09/16/2024 1.70 (H)  0.76 - 1.27 mg/dL Final    EGFR Result 09/16/2024 43.9 (L)  >60.0 mL/min/1.73 Final    Comment: GFR Normal >60  Chronic Kidney Disease <60  Kidney Failure <15      BUN/Creatinine Ratio 09/16/2024 10.0  7.0 - 25.0 Final    Sodium 09/16/2024 140  136 - 145 mmol/L Final    Potassium 09/16/2024 4.8  3.5 - 5.2 mmol/L Final    Chloride 09/16/2024 103  98 - 107 mmol/L Final    Total CO2 09/16/2024 24.8  22.0 - 29.0 mmol/L Final    Calcium 09/16/2024 9.5  8.6 - 10.5 mg/dL Final    Total Protein 09/16/2024 6.6  6.0 - 8.5 g/dL Final    Albumin 09/16/2024 4.4  3.5 - 5.2 g/dL Final    Globulin 09/16/2024 2.2  gm/dL Final    A/G Ratio 09/16/2024 2.0  g/dL Final    Total Bilirubin 09/16/2024 0.3  0.0 - 1.2 mg/dL Final    Alkaline Phosphatase 09/16/2024 70  39 - 117 U/L Final    AST (SGOT) 09/16/2024 26  1 - 40 U/L Final    ALT (SGPT) 09/16/2024  25  1 - 41 U/L Final    TSH 09/16/2024 1.280  0.270 - 4.200 uIU/mL Final    Free T4 09/16/2024 1.48  0.92 - 1.68 ng/dL Final    Hemoglobin A1C 09/16/2024 7.20 (H)  4.80 - 5.60 % Final    Comment: Hemoglobin A1C Ranges:  Increased Risk for Diabetes  5.7% to 6.4%  Diabetes                     >= 6.5%  Diabetic Goal                < 7.0%      Microalbumin, Urine 09/16/2024 31.3  Not Estab. ug/mL Final    25 Hydroxy, Vitamin D 09/16/2024 48.0  30.0 - 100.0 ng/ml Final    Comment: Reference Range for Total Vitamin D 25(OH)  Deficiency <20.0 ng/mL  Insufficiency 21-29 ng/mL  Sufficiency  ng/mL  Toxicity >100 ng/ml        No radiology results for the last 90 days.    Assessment / Plan      Assessment & Plan:  Diagnoses and all orders for this visit:    1. Hoarseness (Primary)  -     Follow Anesthesia Guidelines / Protocol; Standing  -     Follow Anesthesia Guidelines / Protocol; Future  -     Verify NPO; Standing  -     Case Request; Standing  -     Case Request    2. Globus sensation  -     Follow Anesthesia Guidelines / Protocol; Standing  -     Follow Anesthesia Guidelines / Protocol; Future  -     Verify NPO; Standing  -     Case Request; Standing  -     Case Request    3. Colon cancer screening  -     Follow Anesthesia Guidelines / Protocol; Standing  -     Follow Anesthesia Guidelines / Protocol; Future  -     Verify NPO; Standing  -     Case Request; Standing  -     Case Request    Other orders  -     polyethylene glycol (GoLYTELY) 236 g solution; Take 4,000 mL by mouth 1 (One) Time for 1 dose. Please see prep instructions from office.  Dispense: 4000 mL; Refill: 0      Differential for the hoarseness and globus sensation include silent GERD.    Plan for EGD/colonoscopy with monitored anesthesia care. Counseled in detail regarding the risks, benefits and alternatives of the procedure, including but not limited to perforation, bleeding, infection, post-operative pain, complications from anesthesia, aspiration,  cardiac decompensation, need for further procedures or surgery, which may occur in approximately 1 in 1000 procedures. Counseled also that endoscopy and colonoscopy are not perfect tests, and there is a possibility of missed diagnoses, including but not limited to polyps or cancer. Counseled regarding pre procedural instructions. Also discussed bowel preparation. Counseled regarding potential, albeit rare complications with bowel preparation specific to this patients medical history (CKD) and medications, including but not limited to renal insufficiency/failure, electrolyte abnormalities, which may lead to other complications. Hydration is encouraged. Written instructions provided. Instructed to arrange for a ride home for the day of procedure. Patient is in agreement with the above plan and voices understanding of the plan as well as the associated risks and the alternative evaluation/treatment/medication options.       Follow Up:   Return if symptoms worsen or fail to improve.    Morris Serrano MD  [facility]    1/10/2025  10:48 EST    Part of this note may be an electronic transcription/translation of spoken language to printed text using the Dragon Dictation System.

## 2025-01-10 NOTE — H&P (VIEW-ONLY)
New Patient Consult      Date: 01/10/2025   Patient Name: Miller Em  MRN: [id number]  : 1960     Referring Physician: Praful Rapp MD    Chief Complaint   Patient presents with    Colon Cancer Screening       History of Present Illness: Miller Em is a 64 y.o. male who is here today to establish care with Gastroenterology for colon cancer screening.  Last colonoscopy, and upper endoscopy over a decade ago.  He is complaining of some degree of hoarseness, and a globus sensation.  Also had a dry cough, which has persisted despite stopping the lisinopril which was thought to be a potential culprit.  Does not have typical acid reflux, but I wonder if she is having silent GERD.    Subjective      Past Medical History:   Diagnosis Date    Allergic floxin,lisinipril    Arthritis     Benign prostatic hyperplasia     Cataract     Diabetes     Diabetes mellitus     Gout     Headache, migraine     Headache, tension-type     Hyperlipidemia     Hypertension     Impaired mobility     Obesity     Sleep apnea     Tear of meniscus of knee     Thyroid condition     Vitamin D deficiency        Past Surgical History:   Procedure Laterality Date    COLONOSCOPY      EYE SURGERY      Cataract    KNEE ARTHROSCOPY      THYROID SURGERY         Family History   Problem Relation Age of Onset    Stroke Other     Hyperlipidemia Other     Stroke Father     Hyperlipidemia Brother        Social History     Socioeconomic History    Marital status: Single   Tobacco Use    Smoking status: Former     Current packs/day: 0.00     Average packs/day: 0.3 packs/day for 5.2 years (1.3 ttl pk-yrs)     Types: Cigarettes     Start date: 2003     Quit date: 2008     Years since quittin.0     Passive exposure: Past    Smokeless tobacco: Never    Tobacco comments:     on and off x 10-15 years 1 pack per week   Vaping Use    Vaping status: Never Used   Substance and Sexual Activity    Alcohol use: Yes      Alcohol/week: 6.0 standard drinks of alcohol     Types: 3 Cans of beer, 3 Shots of liquor per week     Comment: 1-2 wkly    Drug use: No    Sexual activity: Yes     Partners: Female     Birth control/protection: Condom         Current Outpatient Medications:     Accu-Chek FastClix Lancets misc, TEST DAILY FOR DM E11.9, Disp: 100 each, Rfl: 3    allopurinol (ZYLOPRIM) 300 MG tablet, TAKE 1 TABLET BY MOUTH DAILY, Disp: 90 tablet, Rfl: 3    aspirin 81 MG EC tablet, Take 1 tablet by mouth Daily., Disp: 30 tablet, Rfl: 11    atorvastatin (LIPITOR) 20 MG tablet, TAKE 1 TABLET BY MOUTH AT BEDTIME, Disp: 30 tablet, Rfl: 5    B-12, Methylcobalamin, 1000 MCG sublingual tablet, Place 1 tablet under the tongue Daily., Disp: 90 tablet, Rfl: 3    BD Pen Needle Agnes 2nd Gen 32G X 4 MM misc, USE TWICE DAILY WITH INSULIN, Disp: 200 each, Rfl: 3    fluticasone (Flonase) 50 MCG/ACT nasal spray, 2 sprays into the nostril(s) as directed by provider Daily., Disp: 16 g, Rfl: 11    glucose blood test strip, Patient tests blood sugar one time daily.  Diagnosis code E11.9, Disp: 100 each, Rfl: 3    latanoprost (XALATAN) 0.005 % ophthalmic solution, INSTILL 1 DROP INTO EACH EYE AT BEDTIME, Disp: , Rfl:     levothyroxine (SYNTHROID, LEVOTHROID) 150 MCG tablet, TAKE 1 TABLET BY MOUTH DAILY, Disp: 90 tablet, Rfl: 3    losartan (COZAAR) 100 MG tablet, TAKE 1 TABLET BY MOUTH DAILY, Disp: 90 tablet, Rfl: 3    metFORMIN (GLUCOPHAGE) 500 MG tablet, TAKE 1 TABLET BY MOUTH DAILY WITH BREAKFAST, Disp: 90 tablet, Rfl: 1    metoprolol succinate XL (TOPROL-XL) 50 MG 24 hr tablet, TAKE 1 TABLET BY MOUTH DAILY, Disp: 90 tablet, Rfl: 3    NovoLIN 70/30 FlexPen (70-30) 100 UNIT/ML suspension pen-injector, ADMINISTER 20 UNITS UNDER THE SKIN THREE TIMES DAILY BEFORE MEALS AS DIRECTED, Disp: 45 mL, Rfl: 3    tamsulosin (FLOMAX) 0.4 MG capsule 24 hr capsule, TAKE 1 CAPSULE BY MOUTH DAILY, Disp: 90 capsule, Rfl: 3    Vitamin D3 1.25 MG (87100 UT) capsule, TAKE 1  CAPSULE BY MOUTH WEEKLY, Disp: 12 capsule, Rfl: 4    furosemide (LASIX) 20 MG tablet, Take 1 tablet by mouth Daily., Disp: , Rfl:     methylPREDNISolone (MEDROL) 4 MG dose pack, Take 1 tablet by mouth Daily. Use as directed by package instructions (Patient not taking: Reported on 1/10/2025), Disp: 21 tablet, Rfl: 0    polyethylene glycol (GoLYTELY) 236 g solution, Take 4,000 mL by mouth 1 (One) Time for 1 dose. Please see prep instructions from office., Disp: 4000 mL, Rfl: 0    Allergies   Allergen Reactions    Floxin [Ofloxacin] Hives    Lisinopril Cough and Other (See Comments)       Review of Systems   Gastrointestinal:  Positive for constipation and diarrhea. Negative for abdominal distention, abdominal pain, anal bleeding, blood in stool, nausea, rectal pain, vomiting, GERD and indigestion.       The following portions of the patient's history were reviewed and updated as appropriate: allergies, current medications, past family history, past medical history, past social history, past surgical history and problem list.    Objective     Physical Exam:  Vitals:    01/10/25 0933   BP: 138/96   Pulse: 90   SpO2: 98%   Weight: (!) 145 kg (320 lb)       Physical Exam  Constitutional:       General: He is not in acute distress.     Appearance: Normal appearance.   HENT:      Head: Normocephalic and atraumatic.   Eyes:      General: No scleral icterus.     Conjunctiva/sclera: Conjunctivae normal.   Cardiovascular:      Rate and Rhythm: Normal rate.   Pulmonary:      Effort: Pulmonary effort is normal. No respiratory distress.   Skin:     Coloration: Skin is not jaundiced or pale.   Neurological:      General: No focal deficit present.      Mental Status: He is alert and oriented to person, place, and time.   Psychiatric:         Mood and Affect: Mood normal.         Behavior: Behavior normal.         Results Review:   I have reviewed the patient's new clinical and imaging results and agree with the interpretation.      No visits with results within 90 Day(s) from this visit.   Latest known visit with results is:   Office Visit on 09/12/2024   Component Date Value Ref Range Status    Total Cholesterol 09/16/2024 134  0 - 200 mg/dL Final    Comment: Cholesterol Reference Ranges  (U.S. Department of Health and Human Services ATP III  Classifications)  Desirable          <200 mg/dL  Borderline High    200-239 mg/dL  High Risk          >240 mg/dL  Triglyceride Reference Ranges  (U.S. Department of Health and Human Services ATP III  Classifications)  Normal           <150 mg/dL  Borderline High  150-199 mg/dL  High             200-499 mg/dL  Very High        >500 mg/dL  HDL Reference Ranges  (U.S. Department of Health and Human Services ATP III  Classifications)  Low     <40 mg/dl (major risk factor for CHD)  High    >60 mg/dl ('negative' risk factor for CHD)  LDL Reference Ranges  (U.S. Department of Health and Human Services ATP III  Classifications)  Optimal          <100 mg/dL  Near Optimal     100-129 mg/dL  Borderline High  130-159 mg/dL  High             160-189 mg/dL  Very High        >189 mg/dL      Triglycerides 09/16/2024 132  0 - 150 mg/dL Final    HDL Cholesterol 09/16/2024 41  40 - 60 mg/dL Final    VLDL Cholesterol Paulino 09/16/2024 23  5 - 40 mg/dL Final    LDL Chol Calc (NIH) 09/16/2024 70  0 - 100 mg/dL Final    PSA 09/16/2024 3.160  0.000 - 4.000 ng/mL Final    Comment: Testing Method: [employer] Electrochemiluminescence  Immunoassay(ECLIA)  Values obtained with different assay methods or kits cannot  be used interchangeably.      WBC 09/16/2024 6.88  3.40 - 10.80 10*3/mm3 Final    RBC 09/16/2024 5.44  4.14 - 5.80 10*6/mm3 Final    Hemoglobin 09/16/2024 14.7  13.0 - 17.7 g/dL Final    Hematocrit 09/16/2024 47.1  37.5 - 51.0 % Final    MCV 09/16/2024 86.6  79.0 - 97.0 fL Final    MCH 09/16/2024 27.0  26.6 - 33.0 pg Final    MCHC 09/16/2024 31.2 (L)  31.5 - 35.7 g/dL Final    RDW 09/16/2024 17.3 (H)  12.3 - 15.4  % Final    Platelets 09/16/2024 202  140 - 450 10*3/mm3 Final    Neutrophil Rel % 09/16/2024 39.2 (L)  42.7 - 76.0 % Final    Lymphocyte Rel % 09/16/2024 45.2  19.6 - 45.3 % Final    Monocyte Rel % 09/16/2024 11.2  5.0 - 12.0 % Final    Eosinophil Rel % 09/16/2024 3.1  0.3 - 6.2 % Final    Basophil Rel % 09/16/2024 0.9  0.0 - 1.5 % Final    Neutrophils Absolute 09/16/2024 2.70  1.70 - 7.00 10*3/mm3 Final    Lymphocytes Absolute 09/16/2024 3.11 (H)  0.70 - 3.10 10*3/mm3 Final    Monocytes Absolute 09/16/2024 0.77  0.10 - 0.90 10*3/mm3 Final    Eosinophils Absolute 09/16/2024 0.21  0.00 - 0.40 10*3/mm3 Final    Basophils Absolute 09/16/2024 0.06  0.00 - 0.20 10*3/mm3 Final    Immature Granulocyte Rel % 09/16/2024 0.4  0.0 - 0.5 % Final    Immature Grans Absolute 09/16/2024 0.03  0.00 - 0.05 10*3/mm3 Final    nRBC 09/16/2024 0.0  0.0 - 0.2 /100 WBC Final    Vitamin B-12 09/16/2024 394  211 - 946 pg/mL Final    Results may be falsely increased if patient taking Biotin.    Glucose 09/16/2024 124 (H)  65 - 99 mg/dL Final    BUN 09/16/2024 17  8 - 23 mg/dL Final    Creatinine 09/16/2024 1.70 (H)  0.76 - 1.27 mg/dL Final    EGFR Result 09/16/2024 43.9 (L)  >60.0 mL/min/1.73 Final    Comment: GFR Normal >60  Chronic Kidney Disease <60  Kidney Failure <15      BUN/Creatinine Ratio 09/16/2024 10.0  7.0 - 25.0 Final    Sodium 09/16/2024 140  136 - 145 mmol/L Final    Potassium 09/16/2024 4.8  3.5 - 5.2 mmol/L Final    Chloride 09/16/2024 103  98 - 107 mmol/L Final    Total CO2 09/16/2024 24.8  22.0 - 29.0 mmol/L Final    Calcium 09/16/2024 9.5  8.6 - 10.5 mg/dL Final    Total Protein 09/16/2024 6.6  6.0 - 8.5 g/dL Final    Albumin 09/16/2024 4.4  3.5 - 5.2 g/dL Final    Globulin 09/16/2024 2.2  gm/dL Final    A/G Ratio 09/16/2024 2.0  g/dL Final    Total Bilirubin 09/16/2024 0.3  0.0 - 1.2 mg/dL Final    Alkaline Phosphatase 09/16/2024 70  39 - 117 U/L Final    AST (SGOT) 09/16/2024 26  1 - 40 U/L Final    ALT (SGPT) 09/16/2024  25  1 - 41 U/L Final    TSH 09/16/2024 1.280  0.270 - 4.200 uIU/mL Final    Free T4 09/16/2024 1.48  0.92 - 1.68 ng/dL Final    Hemoglobin A1C 09/16/2024 7.20 (H)  4.80 - 5.60 % Final    Comment: Hemoglobin A1C Ranges:  Increased Risk for Diabetes  5.7% to 6.4%  Diabetes                     >= 6.5%  Diabetic Goal                < 7.0%      Microalbumin, Urine 09/16/2024 31.3  Not Estab. ug/mL Final    25 Hydroxy, Vitamin D 09/16/2024 48.0  30.0 - 100.0 ng/ml Final    Comment: Reference Range for Total Vitamin D 25(OH)  Deficiency <20.0 ng/mL  Insufficiency 21-29 ng/mL  Sufficiency  ng/mL  Toxicity >100 ng/ml        No radiology results for the last 90 days.    Assessment / Plan      Assessment & Plan:  Diagnoses and all orders for this visit:    1. Hoarseness (Primary)  -     Follow Anesthesia Guidelines / Protocol; Standing  -     Follow Anesthesia Guidelines / Protocol; Future  -     Verify NPO; Standing  -     Case Request; Standing  -     Case Request    2. Globus sensation  -     Follow Anesthesia Guidelines / Protocol; Standing  -     Follow Anesthesia Guidelines / Protocol; Future  -     Verify NPO; Standing  -     Case Request; Standing  -     Case Request    3. Colon cancer screening  -     Follow Anesthesia Guidelines / Protocol; Standing  -     Follow Anesthesia Guidelines / Protocol; Future  -     Verify NPO; Standing  -     Case Request; Standing  -     Case Request    Other orders  -     polyethylene glycol (GoLYTELY) 236 g solution; Take 4,000 mL by mouth 1 (One) Time for 1 dose. Please see prep instructions from office.  Dispense: 4000 mL; Refill: 0      Differential for the hoarseness and globus sensation include silent GERD.    Plan for EGD/colonoscopy with monitored anesthesia care. Counseled in detail regarding the risks, benefits and alternatives of the procedure, including but not limited to perforation, bleeding, infection, post-operative pain, complications from anesthesia, aspiration,  cardiac decompensation, need for further procedures or surgery, which may occur in approximately 1 in 1000 procedures. Counseled also that endoscopy and colonoscopy are not perfect tests, and there is a possibility of missed diagnoses, including but not limited to polyps or cancer. Counseled regarding pre procedural instructions. Also discussed bowel preparation. Counseled regarding potential, albeit rare complications with bowel preparation specific to this patients medical history (CKD) and medications, including but not limited to renal insufficiency/failure, electrolyte abnormalities, which may lead to other complications. Hydration is encouraged. Written instructions provided. Instructed to arrange for a ride home for the day of procedure. Patient is in agreement with the above plan and voices understanding of the plan as well as the associated risks and the alternative evaluation/treatment/medication options.       Follow Up:   Return if symptoms worsen or fail to improve.    Morris Serrano MD  [facility] [place]    1/10/2025  10:48 EST    Part of this note may be an electronic transcription/translation of spoken language to printed text using the Dragon Dictation System.

## 2025-01-31 NOTE — PRE-PROCEDURE INSTRUCTIONS
PAT phone history completed with patient for upcoming procedure on 2/6/25.    PAT PASS reviewed with patient and he/she verbalized understanding of the following:     Do not eat or drink anything after midnight the night before procedure unless otherwise instructed by physician/surgeon's office, this includes no gum, candy, mints, tobacco products or e-cigarettes.  Do not shave the area to be operated on at least 48 hours prior to procedure.  Do not wear makeup, lotion, hair products, or nail polish.  Do not wear any jewelry and remove all piercings.  Do not wear any adhesive if you wear dentures.  Do not wear contacts; bring in glasses if needed.  Only take medications on the morning of procedure as instructed by PAT nurse per anesthesia guidelines or as instructed by physician's office.   If you are on any blood thinners reach out to the physician/surgeon's office for instructions on when/if they will need to be stopped prior to procedure.   Bring in picture ID and insurance card, advanced directive copies if applicable, CPAP/BIPAP/Inhalers if indicated morning of procedure, leave any other valuables at home.  Ensure you have arranged for someone to drive you home the day of your procedure and someone to care for you at home afterwards. It is recommended that you do not drive, drink alcohol, or make any major legal decisions for at least 24 hours after your procedure is complete.  Refer to Dr. Serrano's instructions regarding low fiber and clear liquid diets; and bowel prep instructions.       Instructions given on hospital entrance and registration location.

## 2025-02-06 ENCOUNTER — HOSPITAL ENCOUNTER (OUTPATIENT)
Facility: HOSPITAL | Age: 65
Setting detail: HOSPITAL OUTPATIENT SURGERY
Discharge: HOME | End: 2025-02-06
Attending: INTERNAL MEDICINE | Admitting: INTERNAL MEDICINE
Payer: MEDICARE

## 2025-02-06 ENCOUNTER — ANESTHESIA (OUTPATIENT)
Dept: GASTROENTEROLOGY | Facility: HOSPITAL | Age: 65
End: 2025-02-06
Payer: MEDICARE

## 2025-02-06 ENCOUNTER — ANESTHESIA EVENT (OUTPATIENT)
Dept: GASTROENTEROLOGY | Facility: HOSPITAL | Age: 65
End: 2025-02-06
Payer: MEDICARE

## 2025-02-06 VITALS
SYSTOLIC BLOOD PRESSURE: 140 MMHG | HEART RATE: 75 BPM | OXYGEN SATURATION: 96 % | TEMPERATURE: 97.4 F | DIASTOLIC BLOOD PRESSURE: 95 MMHG | RESPIRATION RATE: 18 BRPM

## 2025-02-06 DIAGNOSIS — R49.0 HOARSENESS: ICD-10-CM

## 2025-02-06 DIAGNOSIS — R09.A2 GLOBUS SENSATION: ICD-10-CM

## 2025-02-06 DIAGNOSIS — Z12.11 COLON CANCER SCREENING: ICD-10-CM

## 2025-02-06 LAB — GLUCOSE BLDC GLUCOMTR-MCNC: 82 MG/DL (ref 70–130)

## 2025-02-06 PROCEDURE — 25010000002 LIDOCAINE HCL (CARDIAC) 100 MG/5ML SOLUTION PREFILLED SYRINGE: Performed by: NURSE ANESTHETIST, CERTIFIED REGISTERED

## 2025-02-06 PROCEDURE — 82948 REAGENT STRIP/BLOOD GLUCOSE: CPT

## 2025-02-06 PROCEDURE — 25010000002 ONDANSETRON PER 1 MG: Performed by: NURSE ANESTHETIST, CERTIFIED REGISTERED

## 2025-02-06 PROCEDURE — 25010000002 PROPOFOL 200 MG/20ML EMULSION: Performed by: NURSE ANESTHETIST, CERTIFIED REGISTERED

## 2025-02-06 PROCEDURE — G0121 COLON CA SCRN NOT HI RSK IND: HCPCS | Performed by: INTERNAL MEDICINE

## 2025-02-06 PROCEDURE — 43239 EGD BIOPSY SINGLE/MULTIPLE: CPT | Performed by: INTERNAL MEDICINE

## 2025-02-06 PROCEDURE — 25010000002 GLYCOPYRROLATE PF 0.4 MG/2ML SOLUTION: Performed by: NURSE ANESTHETIST, CERTIFIED REGISTERED

## 2025-02-06 PROCEDURE — 25010000002 MIDAZOLAM PER 1MG: Performed by: NURSE ANESTHETIST, CERTIFIED REGISTERED

## 2025-02-06 RX ORDER — ONDANSETRON 2 MG/ML
INJECTION INTRAMUSCULAR; INTRAVENOUS AS NEEDED
Status: DISCONTINUED | OUTPATIENT
Start: 2025-02-06 | End: 2025-02-06 | Stop reason: SURG

## 2025-02-06 RX ORDER — LIDOCAINE HCL/PF 100 MG/5ML
SYRINGE (ML) INJECTION AS NEEDED
Status: DISCONTINUED | OUTPATIENT
Start: 2025-02-06 | End: 2025-02-06 | Stop reason: SURG

## 2025-02-06 RX ORDER — MIDAZOLAM HYDROCHLORIDE 2 MG/2ML
INJECTION, SOLUTION INTRAMUSCULAR; INTRAVENOUS AS NEEDED
Status: DISCONTINUED | OUTPATIENT
Start: 2025-02-06 | End: 2025-02-06 | Stop reason: SURG

## 2025-02-06 RX ORDER — FAMOTIDINE 40 MG/1
40 TABLET, FILM COATED ORAL 2 TIMES DAILY
Qty: 180 TABLET | Refills: 0 | Status: SHIPPED | OUTPATIENT
Start: 2025-02-06 | End: 2025-05-07

## 2025-02-06 RX ORDER — KETAMINE HCL IN NACL, ISO-OSM 100MG/10ML
SYRINGE (ML) INJECTION AS NEEDED
Status: DISCONTINUED | OUTPATIENT
Start: 2025-02-06 | End: 2025-02-06 | Stop reason: SURG

## 2025-02-06 RX ORDER — PROPOFOL 10 MG/ML
INJECTION, EMULSION INTRAVENOUS AS NEEDED
Status: DISCONTINUED | OUTPATIENT
Start: 2025-02-06 | End: 2025-02-06 | Stop reason: SURG

## 2025-02-06 RX ADMIN — Medication 20 MG: at 09:51

## 2025-02-06 RX ADMIN — MIDAZOLAM HYDROCHLORIDE 2 MG: 1 INJECTION, SOLUTION INTRAMUSCULAR; INTRAVENOUS at 09:46

## 2025-02-06 RX ADMIN — ONDANSETRON 4 MG: 2 INJECTION INTRAMUSCULAR; INTRAVENOUS at 10:08

## 2025-02-06 RX ADMIN — GLYCOPYRROLATE 0.1 MG: 0.2 INJECTION, SOLUTION INTRAMUSCULAR; INTRAVENOUS at 09:46

## 2025-02-06 RX ADMIN — PROPOFOL 150 MCG/KG/MIN: 10 INJECTION, EMULSION INTRAVENOUS at 09:52

## 2025-02-06 RX ADMIN — PROPOFOL 100 MG: 10 INJECTION, EMULSION INTRAVENOUS at 09:51

## 2025-02-06 RX ADMIN — Medication 100 MG: at 09:51

## 2025-02-06 NOTE — ANESTHESIA PREPROCEDURE EVALUATION
Anesthesia Evaluation     Patient summary reviewed and Nursing notes reviewed   NPO Solid Status: > 8 hours  NPO Liquid Status: > 4 hours           Airway   Mallampati: II  TM distance: >3 FB  Neck ROM: full  Possible difficult intubation  Dental - normal exam     Pulmonary - normal exam   (+) a smoker (2008) Former,sleep apnea on CPAP  Cardiovascular - normal exam  Exercise tolerance: good (4-7 METS)    (+) hyperlipidemia    ROS comment: EKG: Sinus rhythm with blocked PACs  Left axis deviatio      Neuro/Psych  (+) headaches (migraines)  GI/Hepatic/Renal/Endo    (+) obesity, morbid obesity, hepatitis, diabetes mellitus type 2 well controlled using insulin, thyroid problem hypothyroidism    ROS Comment: Hyperparathyroid disease     Musculoskeletal     Abdominal    Substance History   (+) alcohol use     OB/GYN          Other   arthritis,     ROS/Med Hx Other: Hypercalcemia   RLS              Anesthesia Plan    ASA 3     MAC     (Risks and benefits discussed including risk of aspiration, recall and dental damage. All patient questions answered. Will continue with POC.)  intravenous induction     Anesthetic plan, risks, benefits, and alternatives have been provided, discussed and informed consent has been obtained with: patient.    Plan discussed with CRNA.    CODE STATUS:

## 2025-02-06 NOTE — ANESTHESIA POSTPROCEDURE EVALUATION
Patient: Miller Em    Procedure Summary       Date: 02/06/25 Room / Location: [facility] ENDOSCOPY 1 / [facility] ENDOSCOPY    Anesthesia Start: 0943 Anesthesia Stop: 1015    Procedures:       ESOPHAGOGASTRODUODENOSCOPY WITH BIOPSIES (Esophagus)      COLONOSCOPY ABORTED DUE TO POOR PREP (Anus) Diagnosis:       Hoarseness      Globus sensation      Colon cancer screening      (Hoarseness [R49.0])      (Globus sensation [R09.A2])      (Colon cancer screening [Z12.11])    Surgeons: Morris Serrano MD Provider: Princess Hernandez CRNA    Anesthesia Type: MAC ASA Status: 3            Anesthesia Type: MAC    Vitals  Vitals Value Taken Time   /95 02/06/25 1038   Temp 97.4 °F (36.3 °C) 02/06/25 1018   Pulse 75 02/06/25 1038   Resp 18 02/06/25 1038   SpO2 96 % 02/06/25 1038           Post Anesthesia Care and Evaluation    Patient location during evaluation: PHASE II  Patient participation: complete - patient participated  Level of consciousness: awake and alert  Pain score: 0  Pain management: satisfactory to patient    Airway patency: patent  Anesthetic complications: No anesthetic complications  PONV Status: none  Cardiovascular status: acceptable and stable  Respiratory status: acceptable  Hydration status: acceptable    Comments: Vitals signs as noted in nursing documentation as per protocol.

## 2025-02-07 LAB — REF LAB TEST METHOD: NORMAL

## 2025-02-10 NOTE — PROGRESS NOTES
Please give the patient the following message:  ----- Results -----  Biopsies are unremarkable.  Gastric biopsies are negative for H. pylori.  GE junction biopsies show changes of acid reflux.

## 2025-02-14 ENCOUNTER — TELEPHONE (OUTPATIENT)
Dept: GASTROENTEROLOGY | Facility: CLINIC | Age: 65
End: 2025-02-14
Payer: MEDICARE

## 2025-02-14 NOTE — TELEPHONE ENCOUNTER
----- Message from Cassi DICKINSON sent at 2/11/2025  1:25 PM EST -----  Called patient re: scheduling.  Will also give results if he calls back.  ----- Message -----  From: Tiff Masters MA  Sent: 2/10/2025  12:16 PM EST  To: Cassi Bundy MA      ----- Message -----  From: Morris Serrano MD  Sent: 2/10/2025  11:42 AM EST  To: [facility]    Please give the patient the following message:  ----- Results -----  Biopsies are unremarkable.  Gastric biopsies are negative for H. pylori.  GE junction biopsies show changes of acid reflux.

## 2025-02-14 NOTE — TELEPHONE ENCOUNTER
Called patient, he does not want to schedule another colonoscopy, did not have interest in Cologard at this time.  He does not want to schedule procedure now.  Offered him an office appointment for later on, and he has declined that as well.

## 2025-02-14 NOTE — TELEPHONE ENCOUNTER
----- Message from Cassi DICKINSON sent at 2/11/2025 10:38 AM EST -----  Regarding: FW: Poor Prep    ----- Message -----  From: Bundy Cassi, MA  Sent: 2/11/2025  12:00 AM EST  To: Cassi TOMASA Bundy  Subject: FW: Poor Prep                                      ----- Message -----  From: Cassi Bundy MA  Sent: 2/7/2025  11:45 AM EST  To: Cassi TOMASA Bundy  Subject: RE: Poor Prep                                    LM for patient to call back.  ----- Message -----  From: Gregor Cassi, MA  Sent: 2/7/2025  12:00 AM EST  To: Cassi Bundy MA  Subject: FW: Poor Prep                                      ----- Message -----  From: Christianne Young RegSched Rep  Sent: 2/6/2025  10:47 AM EST  To: Cassi TOMASA Bundy  Subject: Poor Prep                                        Cassi    Patient was scheduled for a colonoscopy with Dr. MENARD today however was not able to have procedure due to poor prep.    Dr. MENARD recommends:    If he is interested in pursuing a colonoscopy at another date, he will need GoLytely bowel prep, and mag citrate 2 days prior.     If he is agreeable to getting a Cologuard done instead, we can order that as well.    Thank you.  Christianne

## 2025-02-15 DIAGNOSIS — I10 ESSENTIAL HYPERTENSION: ICD-10-CM

## 2025-02-15 RX ORDER — ATORVASTATIN CALCIUM 20 MG/1
20 TABLET, FILM COATED ORAL
Qty: 30 TABLET | Refills: 5 | Status: SHIPPED | OUTPATIENT
Start: 2025-02-15

## 2025-03-13 ENCOUNTER — OFFICE VISIT (OUTPATIENT)
Dept: INTERNAL MEDICINE | Facility: CLINIC | Age: 65
End: 2025-03-13
Payer: MEDICARE

## 2025-03-13 VITALS
RESPIRATION RATE: 16 BRPM | TEMPERATURE: 97.6 F | SYSTOLIC BLOOD PRESSURE: 110 MMHG | WEIGHT: 306 LBS | OXYGEN SATURATION: 99 % | DIASTOLIC BLOOD PRESSURE: 76 MMHG | HEIGHT: 69 IN | HEART RATE: 92 BPM | BODY MASS INDEX: 45.32 KG/M2

## 2025-03-13 DIAGNOSIS — E11.59 TYPE 2 DIABETES MELLITUS WITH OTHER CIRCULATORY COMPLICATION, WITH LONG-TERM CURRENT USE OF INSULIN: ICD-10-CM

## 2025-03-13 DIAGNOSIS — E11.9 DIABETES MELLITUS WITHOUT COMPLICATION: Primary | ICD-10-CM

## 2025-03-13 DIAGNOSIS — R79.89 LOW VITAMIN D LEVEL: ICD-10-CM

## 2025-03-13 DIAGNOSIS — Z79.4 TYPE 2 DIABETES MELLITUS WITH OTHER CIRCULATORY COMPLICATION, WITH LONG-TERM CURRENT USE OF INSULIN: ICD-10-CM

## 2025-03-13 DIAGNOSIS — Z12.11 COLON CANCER SCREENING: ICD-10-CM

## 2025-03-13 DIAGNOSIS — E55.9 VITAMIN D DEFICIENCY, UNSPECIFIED: ICD-10-CM

## 2025-03-13 DIAGNOSIS — I10 ESSENTIAL HYPERTENSION: ICD-10-CM

## 2025-03-13 DIAGNOSIS — Z12.5 ENCOUNTER FOR SCREENING FOR MALIGNANT NEOPLASM OF PROSTATE: ICD-10-CM

## 2025-03-13 DIAGNOSIS — I10 BENIGN ESSENTIAL HYPERTENSION: ICD-10-CM

## 2025-03-13 RX ORDER — TAMSULOSIN HYDROCHLORIDE 0.4 MG/1
1 CAPSULE ORAL DAILY
Qty: 90 CAPSULE | Refills: 3 | Status: SHIPPED | OUTPATIENT
Start: 2025-03-13

## 2025-03-13 RX ORDER — ACYCLOVIR 400 MG/1
1 TABLET ORAL
Qty: 1 EACH | Refills: 0 | COMMUNITY
Start: 2025-03-13

## 2025-03-13 RX ORDER — TAMSULOSIN HYDROCHLORIDE 0.4 MG/1
1 CAPSULE ORAL DAILY
Qty: 90 CAPSULE | Refills: 3 | OUTPATIENT
Start: 2025-03-13

## 2025-03-13 RX ORDER — CHOLECALCIFEROL (VITAMIN D3) 1250 MCG
50000 CAPSULE ORAL
Qty: 12 CAPSULE | Refills: 4 | Status: SHIPPED | OUTPATIENT
Start: 2025-03-13

## 2025-03-13 RX ORDER — ATORVASTATIN CALCIUM 20 MG/1
20 TABLET, FILM COATED ORAL
Qty: 90 TABLET | Refills: 3 | Status: SHIPPED | OUTPATIENT
Start: 2025-03-13

## 2025-03-13 NOTE — PROGRESS NOTES
Anterior Blepharitis OU - Daily Hot compresses and lid scrubs were recommended. Subjective     Patient ID: Miller Em is a 64 y.o. male. Patient is here for management of multiple medical problems.     Dm        History of Present Illness      DM. Has decreased insulin.        The following portions of the patient's history were reviewed and updated as appropriate: allergies, current medications, past family history, past medical history, past social history, past surgical history and problem list.    Review of Systems    Current Outpatient Medications:     Accu-Chek FastClix Lancets [facility] [place] DAILY FOR DM E11.9, Disp: 100 each, Rfl: 3    allopurinol (ZYLOPRIM) 300 MG tablet, TAKE 1 TABLET BY MOUTH DAILY, Disp: 90 tablet, Rfl: 3    aspirin 81 MG EC tablet, Take 1 tablet by mouth Daily., Disp: 30 tablet, Rfl: 11    atorvastatin (LIPITOR) 20 MG tablet, Take 1 tablet by mouth every night at bedtime., Disp: 90 tablet, Rfl: 3    B-12, Methylcobalamin, 1000 MCG sublingual tablet, Place 1 tablet under the tongue Daily., Disp: 90 tablet, Rfl: 3    BD Pen Needle Agnes 2nd Gen 32G X 4 MM misc, USE TWICE DAILY WITH INSULIN, Disp: 200 each, Rfl: 3    Cholecalciferol (Vitamin D3) 1.25 MG (92157 UT) capsule, Take 1 capsule by mouth Every 7 (Seven) Days., Disp: 12 capsule, Rfl: 4    famotidine (PEPCID) 40 MG tablet, Take 1 tablet by mouth 2 (Two) Times a Day for 90 days., Disp: 180 tablet, Rfl: 0    fluticasone (Flonase) 50 MCG/ACT nasal spray, 2 sprays into the nostril(s) as directed by provider Daily., Disp: 16 g, Rfl: 11    glucose blood test strip, Patient tests blood sugar one time daily.  Diagnosis code E11.9, Disp: 100 each, Rfl: 3    latanoprost (XALATAN) 0.005 % ophthalmic solution, , Disp: , Rfl:     levothyroxine (SYNTHROID, LEVOTHROID) 150 MCG tablet, TAKE 1 TABLET BY MOUTH DAILY, Disp: 90 tablet, Rfl: 3    losartan (COZAAR) 100 MG tablet, TAKE 1 TABLET BY MOUTH DAILY, Disp: 90 tablet, Rfl: 3    metFORMIN (GLUCOPHAGE) 500 MG tablet, Take 1 tablet by mouth Daily With Breakfast., Disp:  "90 tablet, Rfl: 3    metoprolol succinate XL (TOPROL-XL) 50 MG 24 hr tablet, TAKE 1 TABLET BY MOUTH DAILY, Disp: 90 tablet, Rfl: 3    NovoLIN 70/30 FlexPen (70-30) 100 UNIT/ML suspension pen-injector, ADMINISTER 20 UNITS UNDER THE SKIN THREE TIMES DAILY BEFORE MEALS AS DIRECTED, Disp: 45 mL, Rfl: 3    tamsulosin (FLOMAX) 0.4 MG capsule 24 hr capsule, Take 1 capsule by mouth Daily., Disp: 90 capsule, Rfl: 3    Continuous Glucose Sensor (Dexcom G7 Sensor) misc, Use 1 each Every 10 (Ten) Days., Disp: 1 each, Rfl: 0    furosemide (LASIX) 20 MG tablet, Take 1 tablet by mouth Daily., Disp: , Rfl:     Objective      Blood pressure 110/76, pulse 92, temperature 97.6 °F (36.4 °C), resp. rate 16, height 175.3 cm (69\"), weight (!) 139 kg (306 lb), SpO2 99%.            Physical Exam     General Appearance:    Alert, cooperative, no distress, appears stated age   Head:    Normocephalic, without obvious abnormality, atraumatic   Eyes:    PERRL, conjunctiva/corneas clear, EOM's intact   Ears:    Normal TM's and external ear canals, both ears   Nose:   Nares normal, septum midline, mucosa normal, no drainage   or sinus tenderness   Throat:   Lips, mucosa, and tongue normal; teeth and gums normal   Neck:   Supple, symmetrical, trachea midline, no adenopathy;        thyroid:  No enlargement/tenderness/nodules; no carotid    bruit or JVD   Back:     Symmetric, no curvature, ROM normal, no CVA tenderness   Lungs:     Clear to auscultation bilaterally, respirations unlabored   Chest wall:    No tenderness or deformity   Heart:    Regular rate and rhythm, S1 and S2 normal, no murmur,        rub or gallop   Abdomen:     Soft, non-tender, bowel sounds active all four quadrants,     no masses, no organomegaly   Extremities:   Extremities normal, atraumatic, no cyanosis or edema   Pulses:   2+ and symmetric all extremities   Skin:   Skin color, texture, turgor normal, no rashes or lesions   Lymph nodes:   Cervical, supraclavicular, and " axillary nodes normal   Neurologic:   CNII-XII intact. Normal strength, sensation and reflexes       throughout      Results for orders placed or performed during the hospital encounter of 25   POC Glucose Once    Collection Time: 25  9:21 AM    Specimen: Blood   Result Value Ref Range    Glucose 82 70 - 130 mg/dL   TISSUE EXAM, [facility] ([facility])    Collection Time: 25  9:55 AM    Specimen: A: Small Intestine, Duodenum; Tissue    B: Gastric, Antrum; Tissue    C: GE Junction; Tissue   Result Value Ref Range    Reference Lab Report       [facility]  [address] [place] [address]  Phone: [phone] or [phone]  Fax: [phone]  Leland Baker M.D., Medical Director    PATIENT NAME                                     LABORATORY NO.  JOHNSON MAN.                        [id number]  [id number]                                 AGE                    SEX   SSN              CLIENT REF #  [place] [id number]        1960           [id number]      [id number]    [address] [place] [address] BY-PASS                        REQUESTING M.D.           ATTENDING MBENITO.         COPY TO.   [address]                                [place] [address]                         DATE COLLECTED            DATE RECEIVED          DATE REPORTED  2025    DIAGNOSIS:  A.     DUODENUM, BIOPSY:  Duodenal mucosa with no significant histopathologic abnormality  No evidence of  dysplasia, carcinoma, or villous atrophy    B.     ANTRUM, BIOPSY:  Gastric mucosa with no significant histopathologic abnormality  No H. pylori organisms identified on routine stains  Negative for intestinal metaplasia, dysplasia, or carcinoma    C.     GE JUNCTION, Z LINE:  Squamocolumnar mucosa with mild nonspecific chronic inflammation  Negative for intestinal metaplasia,  "dysplasia, carcinoma, or increased  intraepithelial eosinophils    CLINICAL HISTORY:  Hoarseness, globus sensation, colon cancer screening    SPECIMENS RECEIVED:  A.    DUODENUM, BIOPSY  B.    ANTRUM, BIOPSY  C.    GE JUNCTION, Z LINE    MICROSCOPIC DESCRIPTION:  Tissue blocks are prepared and slides are examined microscopically on all  specimens. See diagnosis for details.    Professional interpretation rendered by Lamberto Stephens M.D.,GETACHEW. at "[employer], [address] [place] [address].    GROSS DESCRIPTION:  A.    Labeled \"duodenum biopsy\".  Consists of 2 pieces of tan soft tissue  measuring 0.4 x  0.3 x 0.2 cm in aggregate and is filtered and submitted  entirely in 1 block.  TERRY  B.    Labeled \"antrum biopsy\".  Consists of 2 pieces of tan soft tissue measuring  0.4 x 0.3 x 0.2 cm in aggregate and is filtered and submitted entirely in 1  block.  C.    Labeled \"GE junction Z-line biopsy\".  Consists of 2 pieces of tan soft  tissue measuring 0.3 x 0.2 x 0.2 cm in aggregate and is filtered and  submitted entirely in 1 block.    REVIEWED, DIAGNOSED AND ELECTRONICALLY  SIGNED BY:    Lamberto Stephens M.D.,KAMERON.A.P.  CPT CODES:  88305x3           Assessment & Plan     Diet changes discussed. Refined diet changes.    Will need to stop chips.          Diagnoses and all orders for this visit:    1. Diabetes mellitus without complication (Primary)  -     Comprehensive Metabolic Panel  -     Vitamin B12  -     CBC & Differential  -     Lipid Panel  -     Vitamin D,25-Hydroxy  -     PSA Screen  -     Microalbumin / Creatinine Urine Ratio - Urine, Clean Catch  -     Hemoglobin A1c    2. Essential hypertension  -     atorvastatin (LIPITOR) 20 MG tablet; Take 1 tablet by mouth every night at bedtime.  Dispense: 90 tablet; Refill: 3  -     Comprehensive Metabolic Panel  -     Vitamin B12  -     CBC & Differential  -     Lipid Panel  -     Vitamin D,25-Hydroxy  -     PSA Screen  -     Microalbumin / Creatinine Urine Ratio " - Urine, Clean Catch  -     Hemoglobin A1c    3. Type 2 diabetes mellitus with other circulatory complication, with long-term current use of insulin  -     metFORMIN (GLUCOPHAGE) 500 MG tablet; Take 1 tablet by mouth Daily With Breakfast.  Dispense: 90 tablet; Refill: 3  -     Comprehensive Metabolic Panel  -     Vitamin B12  -     CBC & Differential  -     Lipid Panel  -     Vitamin D,25-Hydroxy  -     PSA Screen  -     Microalbumin / Creatinine Urine Ratio - Urine, Clean Catch  -     Hemoglobin A1c    4. Benign essential hypertension  -     Cholecalciferol (Vitamin D3) 1.25 MG (70737 UT) capsule; Take 1 capsule by mouth Every 7 (Seven) Days.  Dispense: 12 capsule; Refill: 4  -     Comprehensive Metabolic Panel  -     Vitamin B12  -     CBC & Differential  -     Lipid Panel  -     Vitamin D,25-Hydroxy  -     PSA Screen  -     Microalbumin / Creatinine Urine Ratio - Urine, Clean Catch  -     Hemoglobin A1c    5. Low vitamin D level  -     Cholecalciferol (Vitamin D3) 1.25 MG (96203 UT) capsule; Take 1 capsule by mouth Every 7 (Seven) Days.  Dispense: 12 capsule; Refill: 4  -     Comprehensive Metabolic Panel  -     Vitamin B12  -     CBC & Differential  -     Lipid Panel  -     Vitamin D,25-Hydroxy  -     PSA Screen  -     Microalbumin / Creatinine Urine Ratio - Urine, Clean Catch  -     Hemoglobin A1c    6. Colon cancer screening  -     Ambulatory Referral to Gastroenterology    7. Vitamin D deficiency, unspecified  -     Vitamin D,25-Hydroxy    8. Encounter for screening for malignant neoplasm of prostate  -     PSA Screen    Other orders  -     tamsulosin (FLOMAX) 0.4 MG capsule 24 hr capsule; Take 1 capsule by mouth Daily.  Dispense: 90 capsule; Refill: 3  -     Continuous Glucose Sensor (Dexcom G7 Sensor) misc; Use 1 each Every 10 (Ten) Days.  Dispense: 1 each; Refill: 0      Return in about 3 months (around 6/13/2025).          There are no Patient Instructions on file for this visit.     Praful Rapp,  MD    Assessment & Plan

## 2025-03-19 ENCOUNTER — OFFICE VISIT (OUTPATIENT)
Dept: GASTROENTEROLOGY | Facility: CLINIC | Age: 65
End: 2025-03-19
Payer: MEDICARE

## 2025-03-19 VITALS
OXYGEN SATURATION: 96 % | WEIGHT: 315 LBS | SYSTOLIC BLOOD PRESSURE: 120 MMHG | BODY MASS INDEX: 47.26 KG/M2 | DIASTOLIC BLOOD PRESSURE: 76 MMHG | HEART RATE: 66 BPM

## 2025-03-19 DIAGNOSIS — R49.0 HOARSENESS: Chronic | ICD-10-CM

## 2025-03-19 DIAGNOSIS — Z12.11 ENCOUNTER FOR SCREENING FOR MALIGNANT NEOPLASM OF COLON: Primary | ICD-10-CM

## 2025-03-19 DIAGNOSIS — R09.A2 GLOBUS SENSATION: Chronic | ICD-10-CM

## 2025-03-19 RX ORDER — BISACODYL 5 MG/1
TABLET, DELAYED RELEASE ORAL
Qty: 4 TABLET | Refills: 0 | Status: SHIPPED | OUTPATIENT
Start: 2025-03-19

## 2025-03-19 RX ORDER — SODIUM, POTASSIUM,MAG SULFATES 17.5-3.13G
SOLUTION, RECONSTITUTED, ORAL ORAL
Qty: 177 ML | Refills: 0 | Status: SHIPPED | OUTPATIENT
Start: 2025-03-19

## 2025-03-19 RX ORDER — SODIUM CHLORIDE 9 MG/ML
70 INJECTION, SOLUTION INTRAVENOUS CONTINUOUS PRN
OUTPATIENT
Start: 2025-03-19 | End: 2025-03-20

## 2025-03-19 NOTE — PROGRESS NOTES
Follow Up Note     Date: 2025   Patient Name: Miller Em  MRN: [id number]  : 1960     Primary Care Provider: Praful Rapp MD     Chief Complaint   Patient presents with    Colon Cancer Screening     3/19/2025  History of Present Illness  The patient is a 64-year-old male who is here for follow-up to schedule a colonoscopy.    He was previously under the care of Dr. Serrano, who attempted a colonoscopy but was unsuccessful due to inadequate preparation. He states he completed the entire jug of GoLytely within the specified time frame and did the diet changes as instructed prior to the procedure. He reports no history of constipation. His bowel movements are typically regular, occurring 2 to 3 times daily, with the exception of one day when he did not have a bowel movement. At times stools are small roger initially, but they progress to a soft formed stool. He reports no presence of blood or black, tarry stools.    He has been prescribed famotidine twice daily, but his compliance has been inconsistent, often taking it once daily or not at all. He reports that the medication has not been effective in managing his hoarseness. He continues to experience symptoms intermittently. He was initiated on famotidine therapy due to complaints of a persistent sensation in his throat and coughing up phlegm, similar to having a cold, but this is unchanged.     Interval History:  1/10/2025 per Dr. Serrano  Miller Em is a 64 y.o. male who is here today to establish care with Gastroenterology for colon cancer screening.  Last colonoscopy, and upper endoscopy over a decade ago.  He is complaining of some degree of hoarseness, and a globus sensation.  Also had a dry cough, which has persisted despite stopping the lisinopril which was thought to be a potential culprit.  Does not have typical acid reflux, but I wonder if she is having silent GERD.    Subjective      Past Medical History:    Diagnosis Date    Allergic floxin,lisinipril    Arthritis     Benign prostatic hyperplasia     Cataract     Colon polyp     Diabetes     Diabetes mellitus     Gout     Headache, migraine     Headache, tension-type     Hyperlipidemia     Hypertension     Impaired mobility     Infectious viral hepatitis     Obesity     Sleep apnea     Tear of meniscus of knee     Thyroid condition     Vitamin D deficiency      Past Surgical History:   Procedure Laterality Date    ANAL FISSURECTOMY      COLONOSCOPY      COLONOSCOPY N/A 2025    Procedure: COLONOSCOPY ABORTED DUE TO POOR PREP;  Surgeon: Morris Serrano MD;  Location: [facility];  Service: Gastroenterology;  Laterality: N/A;    ENDOSCOPY N/A 2025    Procedure: ESOPHAGOGASTRODUODENOSCOPY WITH BIOPSIES;  Surgeon: Morris Serrano MD;  Location: [facility];  Service: Gastroenterology;  Laterality: N/A;    EYE SURGERY      Cataract    KNEE ARTHROSCOPY      THYROID SURGERY       Family History   Problem Relation Age of Onset    Stroke Father     Hyperlipidemia Brother     Stroke Other     Hyperlipidemia Other     Colon cancer Neg Hx     Esophageal cancer Neg Hx     Stomach cancer Neg Hx      Social History     Socioeconomic History    Marital status: Single   Tobacco Use    Smoking status: Former     Current packs/day: 0.00     Average packs/day: 0.3 packs/day for 5.2 years (1.3 ttl pk-yrs)     Types: Cigarettes     Start date: 2003     Quit date: 2008     Years since quittin.2     Passive exposure: Past    Smokeless tobacco: Never    Tobacco comments:     on and off x 10-15 years 1 pack per week   Vaping Use    Vaping status: Never Used   Substance and Sexual Activity    Alcohol use: Yes     Alcohol/week: 6.0 standard drinks of alcohol     Types: 3 Cans of beer, 3 Shots of liquor per week     Comment: 1-2 wkly    Drug use: No    Sexual activity: Yes     Partners: Female     Birth control/protection: Condom       Current Outpatient Medications:      Accu-Chek FastClix Lancets [facility] [place], TEST DAILY FOR DM E11.9, Disp: 100 each, Rfl: 3    allopurinol (ZYLOPRIM) 300 MG tablet, TAKE 1 TABLET BY MOUTH DAILY, Disp: 90 tablet, Rfl: 3    aspirin 81 MG EC tablet, Take 1 tablet by mouth Daily., Disp: 30 tablet, Rfl: 11    atorvastatin (LIPITOR) 20 MG tablet, Take 1 tablet by mouth every night at bedtime., Disp: 90 tablet, Rfl: 3    B-12, Methylcobalamin, 1000 MCG sublingual tablet, Place 1 tablet under the tongue Daily., Disp: 90 tablet, Rfl: 3    BD Pen Needle Agnes 2nd Gen 32G X 4 MM misc, USE TWICE DAILY WITH INSULIN, Disp: 200 each, Rfl: 3    Cholecalciferol (Vitamin D3) 1.25 MG (31567 UT) capsule, Take 1 capsule by mouth Every 7 (Seven) Days., Disp: 12 capsule, Rfl: 4    Continuous Glucose Sensor (Dexcom G7 Sensor) misc, Use 1 each Every 10 (Ten) Days., Disp: 1 each, Rfl: 0    famotidine (PEPCID) 40 MG tablet, Take 1 tablet by mouth 2 (Two) Times a Day for 90 days., Disp: 180 tablet, Rfl: 0    fluticasone (Flonase) 50 MCG/ACT nasal spray, 2 sprays into the nostril(s) as directed by provider Daily., Disp: 16 g, Rfl: 11    glucose blood test strip, Patient tests blood sugar one time daily.  Diagnosis code E11.9, Disp: 100 each, Rfl: 3    latanoprost (XALATAN) 0.005 % ophthalmic solution, , Disp: , Rfl:     levothyroxine (SYNTHROID, LEVOTHROID) 150 MCG tablet, TAKE 1 TABLET BY MOUTH DAILY, Disp: 90 tablet, Rfl: 3    losartan (COZAAR) 100 MG tablet, TAKE 1 TABLET BY MOUTH DAILY, Disp: 90 tablet, Rfl: 3    metFORMIN (GLUCOPHAGE) 500 MG tablet, Take 1 tablet by mouth Daily With Breakfast., Disp: 90 tablet, Rfl: 3    metoprolol succinate XL (TOPROL-XL) 50 MG 24 hr tablet, TAKE 1 TABLET BY MOUTH DAILY, Disp: 90 tablet, Rfl: 3    NovoLIN 70/30 FlexPen (70-30) 100 UNIT/ML suspension pen-injector, ADMINISTER 20 UNITS UNDER THE SKIN THREE TIMES DAILY BEFORE MEALS AS DIRECTED, Disp: 45 mL, Rfl: 3    tamsulosin (FLOMAX) 0.4 MG capsule 24 hr capsule, Take 1 capsule by mouth Daily.,  Disp: 90 capsule, Rfl: 3    bisacodyl (DULCOLAX) 5 MG EC tablet, Take as directed for colon prep, Disp: 4 tablet, Rfl: 0    furosemide (LASIX) 20 MG tablet, Take 1 tablet by mouth Daily., Disp: , Rfl:     sodium-potassium-magnesium sulfates (Suprep Bowel Prep Kit) 17.5-3.13-1.6 GM/177ML solution oral solution, Use as directed for colonoscopy prep. Patient has instructions., Disp: 177 mL, Rfl: 0    Allergies   Allergen Reactions    Floxin [Ofloxacin] Hives    Lisinopril Cough and Other (See Comments)     The following portions of the patient's history were reviewed and updated as appropriate: allergies, current medications, past family history, past medical history, past social history, past surgical history and problem list.  Objective     Physical Exam  Vitals and nursing note reviewed.   Constitutional:       General: He is not in acute distress.     Appearance: Normal appearance. He is well-developed.   HENT:      Head: Normocephalic and atraumatic.      Mouth/Throat:      Mouth: Mucous membranes are not pale, not dry and not cyanotic.   Eyes:      General: Lids are normal.   Neck:      Trachea: Trachea normal.   Cardiovascular:      Rate and Rhythm: Normal rate.   Pulmonary:      Effort: Pulmonary effort is normal. No respiratory distress.      Breath sounds: Normal breath sounds.   Abdominal:      Tenderness: There is no abdominal tenderness.   Skin:     General: Skin is warm and dry.   Neurological:      Mental Status: He is alert and oriented to person, place, and time.   Psychiatric:         Mood and Affect: Mood normal.         Speech: Speech normal.         Behavior: Behavior normal. Behavior is cooperative.       Vitals:    03/19/25 1115   BP: 120/76   Pulse: 66   SpO2: 96%   Weight: (!) 145 kg (320 lb)     Body mass index is 47.26 kg/m².     Results Review:   I reviewed the patient's new clinical results.    Admission on 02/06/2025, Discharged on 02/06/2025   Component Date Value Ref Range Status     Glucose 2025 82  70 - 130 mg/dL Final    Serial Number: [id number]:  [id number]    Reference Lab Report 2025    Final                    Value:[facility]  [address] [place] [address]  Phone: [phone] or [phone]  Fax: [phone]  Leland Baker M.D., Medical Director    PATIENT NAME                                     LABORATORY NO.  JOHNSON MAN.                        [id number]  [id number]                                 AGE                    SEX   SSN              CLIENT REF #  [place] [id number]        1960           [id number]      [id number]    [address] [place] [address] BY-PASS                        REQUESTING M.D.           ATTENDING M.D.         COPY TO.  [address]                                [place] [address]                         DATE COLLECTED            DATE RECEIVED          DATE REPORTED  2025    DIAGNOSIS:  A.     DUODENUM, BIOPSY:  Duodenal mucosa with no significant histopathologic abnormality  No evidence of    dysplasia, carcinoma, or villous atrophy    B.     ANTRUM, BIOPSY:  Gastric mucosa with no significant histopathologic abnormality  No H. pylori organisms identified on routine stains  Negative for intestinal metaplasia, dysplasia, or carcinoma    C.     GE JUNCTION, Z LINE:  Squamocolumnar mucosa with mild nonspecific chronic inflammation  Negative for intestinal metaplasia, dysplasia, carcinoma, or increased  intraepithelial eosinophils     REVIEWED, DIAGNOSED AND ELECTRONICALLY  SIGNED BY:    Lamberto Stephens M.D.,F.C.A.P.  CPT CODES:  88305x3        CTAP without contrast 2023  No acute disease.     Ultrasound thyroid 5/10/2024  Recommend 6 month follow-up exam of the TR 4 left thyroid nodule.     EGD 2025 per Dr. Serrano  - Esophagitis. Mild antral gastritis and bulbar  duodenitis. Negative for peptic ulcer disease.  - Biopsies were taken with a cold forceps for Helicobacter pylori testing.  - Biopsies were taken with a cold forceps for evaluation of celiac disease.  - Biopsies were taken with a cold forceps for histology at the gastroesophageal junction.  A.     DUODENUM, BIOPSY:  Duodenal mucosa with no significant histopathologic abnormality  No evidence of dysplasia, carcinoma, or villous atrophy  B.     ANTRUM, BIOPSY:  Gastric mucosa with no significant histopathologic abnormality  No H. pylori organisms identified on routine stains  Negative for intestinal metaplasia, dysplasia, or carcinoma  C.     GE JUNCTION, Z LINE:  Squamocolumnar mucosa with mild nonspecific chronic inflammation  Negative for intestinal metaplasia, dysplasia, carcinoma, or increased  intraepithelial eosinophils     Colonoscopy 2/6/2025 per Dr. Serrano  - Poor prep  - No specimens collected.    Assessment / Plan      1. Encounter for screening for malignant neoplasm of colon  Colonoscopy dated 2/6/2025 per Dr. Serrano with poor prep, scope only advanced to sigmoid colon, no specimens were collected.  Patient states he completed the GoLytely prep as instructed.  Colonoscopy for colon cancer screening -will use extended prep    - Case Request  - sodium-potassium-magnesium sulfates (Suprep Bowel Prep Kit) 17.5-3.13-1.6 GM/177ML solution oral solution; Use as directed for colonoscopy prep. Patient has instructions.  Dispense: 177 mL; Refill: 0  - bisacodyl (DULCOLAX) 5 MG EC tablet; Take as directed for colon prep  Dispense: 4 tablet; Refill: 0    2. Hoarseness  3. Globus sensation  Symptoms unchanged.  EGD dated 2/6/2025 per Dr. Serrano with esophagitis, otherwise unremarkable.  Biopsies unremarkable.  He was recommended to take famotidine 40 mg twice a day due to his CKD for at least 8 to 12 weeks.  Patient has been taking it once a day as needed.  Antireflux measures discussed.  Famotidine 40 mg twice a  day for 8 to 12 weeks.    Patient Instructions   Antireflux measures: Avoid fried, fatty foods, alcohol, chocolate, coffee, tea,  soft drinks, all carbonated beverages (including sparkling water), peppermint and spearmint, spicy foods, tomatoes and tomato based foods, onion based foods, and garlic.   Other antireflux measures include weight reduction if overweight, avoiding tight clothing around the abdomen, elevating the head of the bed 6 inches with blocks under the head board, and don't drink or eat before going to bed and avoid lying down immediately after meals.  Continue to avoid vaping/smoking/marijuana/THC/CBD.   Famotidine 40 mg twice daily for 12 weeks.  High fiber, low fat diet with liberal water intake.   Metamucil 1 packet/scoop daily or fiber gummies/capsules 2 per day.   Colonoscopy: The indications, technique, alternatives and potential risk and complications were discussed with the patient including but not limited to bleeding, perforations, missing lesions and anesthetic complications. The patient understands and wishes to proceed with the procedure and has given their verbal consent. Written patient education information was given to the patient.   The patient will call if they have further questions before procedure.     ODETTE Caballero  3/19/2025    Please note that portions of this note were completed with a voice recognition program.     Patient or patient representative verbalized consent for the use of Ambient Listening during the visit with  ODETTE Caballero for chart documentation. 3/19/2025  11:46 EDT

## 2025-03-19 NOTE — PROGRESS NOTES
New Patient Consult      Date: 2025   Patient Name: Miller Em  MRN: [id number]  : 1960     Primary Care Provider: Praful Rapp MD    Chief Complaint   Patient presents with    Colon Cancer Screening     3/19/2025  History of Present Illness                 Subjective      Past Medical History:   Diagnosis Date    Allergic floxin,lisinipril    Arthritis     Benign prostatic hyperplasia     Cataract     Colon polyp     Diabetes     Diabetes mellitus     Gout     Headache, migraine     Headache, tension-type     Hyperlipidemia     Hypertension     Impaired mobility     Infectious viral hepatitis     Obesity     Sleep apnea     Tear of meniscus of knee     Thyroid condition     Vitamin D deficiency      Past Surgical History:   Procedure Laterality Date    ANAL FISSURECTOMY      COLONOSCOPY      COLONOSCOPY N/A 2025    Procedure: COLONOSCOPY ABORTED DUE TO POOR PREP;  Surgeon: Morris Serrano MD;  Location: [facility] ENDOSCOPY;  Service: Gastroenterology;  Laterality: N/A;    ENDOSCOPY N/A 2025    Procedure: ESOPHAGOGASTRODUODENOSCOPY WITH BIOPSIES;  Surgeon: Morris Serrano MD;  Location: [facility] ENDOSCOPY;  Service: Gastroenterology;  Laterality: N/A;    EYE SURGERY      Cataract    KNEE ARTHROSCOPY      THYROID SURGERY       Family History   Problem Relation Age of Onset    Stroke Other     Hyperlipidemia Other     Stroke Father     Hyperlipidemia Brother      Social History     Socioeconomic History    Marital status: Single   Tobacco Use    Smoking status: Former     Current packs/day: 0.00     Average packs/day: 0.3 packs/day for 5.2 years (1.3 ttl pk-yrs)     Types: Cigarettes     Start date: 2003     Quit date: 2008     Years since quittin.2     Passive exposure: Past    Smokeless tobacco: Never    Tobacco comments:     on and off x 10-15 years 1 pack per week   Vaping Use    Vaping status: Never Used   Substance and Sexual Activity    Alcohol use: Yes      Alcohol/week: 6.0 standard drinks of alcohol     Types: 3 Cans of beer, 3 Shots of liquor per week     Comment: 1-2 wkly    Drug use: No    Sexual activity: Yes     Partners: Female     Birth control/protection: Condom       Current Outpatient Medications:     Accu-Chek FastClix Lancets misc, TEST DAILY FOR DM E11.9, Disp: 100 each, Rfl: 3    allopurinol (ZYLOPRIM) 300 MG tablet, TAKE 1 TABLET BY MOUTH DAILY, Disp: 90 tablet, Rfl: 3    aspirin 81 MG EC tablet, Take 1 tablet by mouth Daily., Disp: 30 tablet, Rfl: 11    atorvastatin (LIPITOR) 20 MG tablet, Take 1 tablet by mouth every night at bedtime., Disp: 90 tablet, Rfl: 3    B-12, Methylcobalamin, 1000 MCG sublingual tablet, Place 1 tablet under the tongue Daily., Disp: 90 tablet, Rfl: 3    BD Pen Needle Agnes 2nd Gen 32G X 4 MM misc, USE TWICE DAILY WITH INSULIN, Disp: 200 each, Rfl: 3    Cholecalciferol (Vitamin D3) 1.25 MG (40683 UT) capsule, Take 1 capsule by mouth Every 7 (Seven) Days., Disp: 12 capsule, Rfl: 4    Continuous Glucose Sensor (Dexcom G7 Sensor) misc, Use 1 each Every 10 (Ten) Days., Disp: 1 each, Rfl: 0    famotidine (PEPCID) 40 MG tablet, Take 1 tablet by mouth 2 (Two) Times a Day for 90 days., Disp: 180 tablet, Rfl: 0    fluticasone (Flonase) 50 MCG/ACT nasal spray, 2 sprays into the nostril(s) as directed by provider Daily., Disp: 16 g, Rfl: 11    glucose blood test strip, Patient tests blood sugar one time daily.  Diagnosis code E11.9, Disp: 100 each, Rfl: 3    latanoprost (XALATAN) 0.005 % ophthalmic solution, , Disp: , Rfl:     levothyroxine (SYNTHROID, LEVOTHROID) 150 MCG tablet, TAKE 1 TABLET BY MOUTH DAILY, Disp: 90 tablet, Rfl: 3    losartan (COZAAR) 100 MG tablet, TAKE 1 TABLET BY MOUTH DAILY, Disp: 90 tablet, Rfl: 3    metFORMIN (GLUCOPHAGE) 500 MG tablet, Take 1 tablet by mouth Daily With Breakfast., Disp: 90 tablet, Rfl: 3    metoprolol succinate XL (TOPROL-XL) 50 MG 24 hr tablet, TAKE 1 TABLET BY MOUTH DAILY, Disp: 90 tablet, Rfl:  3    NovoLIN 70/30 FlexPen (70-30) 100 UNIT/ML suspension pen-injector, ADMINISTER 20 UNITS UNDER THE SKIN THREE TIMES DAILY BEFORE MEALS AS DIRECTED, Disp: 45 mL, Rfl: 3    tamsulosin (FLOMAX) 0.4 MG capsule 24 hr capsule, Take 1 capsule by mouth Daily., Disp: 90 capsule, Rfl: 3    furosemide (LASIX) 20 MG tablet, Take 1 tablet by mouth Daily., Disp: , Rfl:    Allergies   Allergen Reactions    Floxin [Ofloxacin] Hives    Lisinopril Cough and Other (See Comments)     The following portions of the patient's history were reviewed and updated as appropriate: allergies, current medications, past family history, past medical history, past social history, past surgical history and problem list.    Objective     Physical Exam    Vitals:    25 1115   BP: 120/76   Pulse: 66   SpO2: 96%   Weight: (!) 145 kg (320 lb)     Body mass index is 47.26 kg/m².     Results Review:   I have reviewed the patient's new clinical and imaging results.    Admission on 2025, Discharged on 2025   Component Date Value Ref Range Status    Glucose 2025 82  70 - 130 mg/dL Final    Serial Number: [id number]:  [id number]    Reference Lab Report 2025    Final                    Value:[facility]  [address] [place] [address]  Phone: [phone] or [phone]  Fax: [phone]  Leland Baker M.D., Medical Director    PATIENT NAME                                     LABORATORY NO.  JOHNSON MAN.                        [id number]  [id number]                                 AGE                    SEX   SSN              CLIENT REF #  [facility]                    64        1960           [id number]      [id number]    [address] [place] [address] BY-PASS                        REQUESTING M.D.           ATTENDING [facility]         COPY TO.  [address]                                [place], [place] [address]                       "   DATE COLLECTED            DATE RECEIVED          DATE REPORTED  02/06/2025 02/06/2025 02/07/2025    DIAGNOSIS:  A.     DUODENUM, BIOPSY:  Duodenal mucosa with no significant histopathologic abnormality  No evidence of                           dysplasia, carcinoma, or villous atrophy    B.     ANTRUM, BIOPSY:  Gastric mucosa with no significant histopathologic abnormality  No H. pylori organisms identified on routine stains  Negative for intestinal metaplasia, dysplasia, or carcinoma    C.     GE JUNCTION, Z LINE:  Squamocolumnar mucosa with mild nonspecific chronic inflammation  Negative for intestinal metaplasia, dysplasia, carcinoma, or increased  intraepithelial eosinophils    CLINICAL HISTORY:  Hoarseness, globus sensation, colon cancer screening    SPECIMENS RECEIVED:  A.    DUODENUM, BIOPSY  B.    ANTRUM, BIOPSY  C.    GE JUNCTION, Z LINE    MICROSCOPIC DESCRIPTION:  Tissue blocks are prepared and slides are examined microscopically on all  specimens. See diagnosis for details.    Professional interpretation rendered by Lamberto Stephens M.D.,F.C.A.P. at [email or web address], [employer], [address] [place] [address].    GROSS DESCRIPTION:  A.    Labeled \"duodenum biopsy\".  Consists of 2 pieces of tan soft tissue  measuring 0.4 x                           0.3 x 0.2 cm in aggregate and is filtered and submitted  entirely in 1 block.  TERRY  B.    Labeled \"antrum biopsy\".  Consists of 2 pieces of tan soft tissue measuring  0.4 x 0.3 x 0.2 cm in aggregate and is filtered and submitted entirely in 1  block.  C.    Labeled \"GE junction Z-line biopsy\".  Consists of 2 pieces of tan soft  tissue measuring 0.3 x 0.2 x 0.2 cm in aggregate and is filtered and  submitted entirely in 1 block.    REVIEWED, DIAGNOSED AND ELECTRONICALLY  SIGNED BY:    Lamberto Stephens M.D.,F.C.A.P.  CPT CODES:  88305x3        No radiology results for the last 90 days.     Assessment / Plan      There are no diagnoses linked to " this encounter.  There are no Patient Instructions on file for this visit.  Assessment & Plan          Leo Gaffney MA  3/19/2025    Please note that portions of this note may have been completed with a voice recognition program.     {ALYSSA [employer] Provider Statement:[id number]}

## 2025-03-19 NOTE — PATIENT INSTRUCTIONS
Antireflux measures: Avoid fried, fatty foods, alcohol, chocolate, coffee, tea,  soft drinks, all carbonated beverages (including sparkling water), peppermint and spearmint, spicy foods, tomatoes and tomato based foods, onion based foods, and garlic.   Other antireflux measures include weight reduction if overweight, avoiding tight clothing around the abdomen, elevating the head of the bed 6 inches with blocks under the head board, and don't drink or eat before going to bed and avoid lying down immediately after meals.  Continue to avoid vaping/smoking/marijuana/THC/CBD.   Famotidine 40 mg twice daily for 12 weeks.  High fiber, low fat diet with liberal water intake.   Metamucil 1 packet/scoop daily or fiber gummies/capsules 2 per day.   Colonoscopy: The indications, technique, alternatives and potential risk and complications were discussed with the patient including but not limited to bleeding, perforations, missing lesions and anesthetic complications. The patient understands and wishes to proceed with the procedure and has given their verbal consent. Written patient education information was given to the patient.   The patient will call if they have further questions before procedure.

## 2025-04-24 NOTE — PRE-PROCEDURE INSTRUCTIONS
PAT phone history completed with patient for upcoming procedure on 4/29/25, with Dr. Gomez.    PAT PASS reviewed with patient and they verbalize understanding of the following:     Do not eat or drink anything after midnight the night before procedure unless otherwise instructed by physician/surgeon's office, this includes no gum, candy, mints, tobacco products or e-cigarettes.  Do not shave the area to be operated on at least 48 hours prior to procedure.  Do not wear makeup, lotion, hair products, or nail polish.  Do not wear any jewelry and remove all piercings.  Do not wear any adhesive if you wear dentures.  Do not wear contacts; bring in glasses if needed.  Only take medications on the morning of procedure as instructed by PAT nurse per anesthesia guidelines or as instructed by physician's office.  If you are on any blood thinners reach out to the physician/surgeon's office for instructions on when/if they will need to be stopped prior to procedure.  Bring in picture ID and insurance card, advanced directive copies if applicable, CPAP/BIPAP/Inhalers if indicated morning of procedure, leave any other valuables at home.  Ensure you have arranged for someone to drive you home the day of your procedure and someone to care for you at home afterwards. It is recommended that you do not drive, drink alcohol, or make any major legal decisions for at least 24 hours after your procedure is complete.  ERAS instructions given unless otherwise instructed per surgeon's orders.    Instructions given on hospital entrance and registration location.

## 2025-04-28 DIAGNOSIS — R09.A2 GLOBUS SENSATION: ICD-10-CM

## 2025-04-28 DIAGNOSIS — R49.0 HOARSENESS: Primary | ICD-10-CM

## 2025-04-28 RX ORDER — FAMOTIDINE 40 MG/1
40 TABLET, FILM COATED ORAL 2 TIMES DAILY
Qty: 180 TABLET | Refills: 1 | Status: SHIPPED | OUTPATIENT
Start: 2025-04-28

## 2025-04-29 ENCOUNTER — HOSPITAL ENCOUNTER (OUTPATIENT)
Facility: HOSPITAL | Age: 65
Setting detail: HOSPITAL OUTPATIENT SURGERY
Discharge: HOME | End: 2025-04-29
Attending: INTERNAL MEDICINE | Admitting: INTERNAL MEDICINE
Payer: MEDICARE

## 2025-04-29 ENCOUNTER — ANESTHESIA EVENT (OUTPATIENT)
Dept: GASTROENTEROLOGY | Facility: HOSPITAL | Age: 65
End: 2025-04-29
Payer: MEDICARE

## 2025-04-29 ENCOUNTER — ANESTHESIA (OUTPATIENT)
Dept: GASTROENTEROLOGY | Facility: HOSPITAL | Age: 65
End: 2025-04-29
Payer: MEDICARE

## 2025-04-29 VITALS
OXYGEN SATURATION: 98 % | RESPIRATION RATE: 15 BRPM | BODY MASS INDEX: 46.65 KG/M2 | SYSTOLIC BLOOD PRESSURE: 144 MMHG | DIASTOLIC BLOOD PRESSURE: 107 MMHG | WEIGHT: 315 LBS | HEART RATE: 84 BPM | HEIGHT: 69 IN | TEMPERATURE: 97 F

## 2025-04-29 DIAGNOSIS — Z12.11 ENCOUNTER FOR SCREENING FOR MALIGNANT NEOPLASM OF COLON: ICD-10-CM

## 2025-04-29 LAB — GLUCOSE BLDC GLUCOMTR-MCNC: 87 MG/DL (ref 70–130)

## 2025-04-29 PROCEDURE — 45385 COLONOSCOPY W/LESION REMOVAL: CPT | Performed by: INTERNAL MEDICINE

## 2025-04-29 PROCEDURE — 82948 REAGENT STRIP/BLOOD GLUCOSE: CPT

## 2025-04-29 PROCEDURE — 45390 COLONOSCOPY W/RESECTION: CPT | Performed by: INTERNAL MEDICINE

## 2025-04-29 PROCEDURE — 25810000003 SODIUM CHLORIDE 0.9 % SOLUTION: Performed by: NURSE PRACTITIONER

## 2025-04-29 PROCEDURE — 25010000002 LIDOCAINE (CARDIAC): Performed by: NURSE ANESTHETIST, CERTIFIED REGISTERED

## 2025-04-29 PROCEDURE — 25010000002 PROPOFOL 10 MG/ML EMULSION: Performed by: NURSE ANESTHETIST, CERTIFIED REGISTERED

## 2025-04-29 DEVICE — CLIP
Type: IMPLANTABLE DEVICE | Site: CECUM | Status: FUNCTIONAL
Brand: MANTIS™ CLIP

## 2025-04-29 DEVICE — DEV CLIP ENDO RESOLUTION360 CONTRL ROT 235CM: Type: IMPLANTABLE DEVICE | Site: CECUM | Status: FUNCTIONAL

## 2025-04-29 RX ORDER — ONDANSETRON 2 MG/ML
4 INJECTION INTRAMUSCULAR; INTRAVENOUS ONCE AS NEEDED
Status: DISCONTINUED | OUTPATIENT
Start: 2025-04-29 | End: 2025-04-29 | Stop reason: HOSPADM

## 2025-04-29 RX ORDER — IPRATROPIUM BROMIDE AND ALBUTEROL SULFATE 2.5; .5 MG/3ML; MG/3ML
3 SOLUTION RESPIRATORY (INHALATION) ONCE
Status: DISCONTINUED | OUTPATIENT
Start: 2025-04-29 | End: 2025-04-29 | Stop reason: HOSPADM

## 2025-04-29 RX ORDER — PROPOFOL 10 MG/ML
VIAL (ML) INTRAVENOUS AS NEEDED
Status: DISCONTINUED | OUTPATIENT
Start: 2025-04-29 | End: 2025-04-29 | Stop reason: SURG

## 2025-04-29 RX ORDER — SODIUM CHLORIDE 0.9 % (FLUSH) 0.9 %
10 SYRINGE (ML) INJECTION AS NEEDED
Status: DISCONTINUED | OUTPATIENT
Start: 2025-04-29 | End: 2025-04-29 | Stop reason: HOSPADM

## 2025-04-29 RX ORDER — SODIUM CHLORIDE 9 MG/ML
70 INJECTION, SOLUTION INTRAVENOUS CONTINUOUS PRN
Status: DISCONTINUED | OUTPATIENT
Start: 2025-04-29 | End: 2025-04-29 | Stop reason: HOSPADM

## 2025-04-29 RX ORDER — SIMETHICONE 40MG/0.6ML
SUSPENSION, DROPS(FINAL DOSAGE FORM)(ML) ORAL AS NEEDED
Status: DISCONTINUED | OUTPATIENT
Start: 2025-04-29 | End: 2025-04-29 | Stop reason: HOSPADM

## 2025-04-29 RX ADMIN — SODIUM CHLORIDE 70 ML/HR: 9 INJECTION, SOLUTION INTRAVENOUS at 10:46

## 2025-04-29 RX ADMIN — PROPOFOL 800 MG: 10 INJECTION, EMULSION INTRAVENOUS at 12:22

## 2025-04-29 RX ADMIN — LIDOCAINE HYDROCHLORIDE 100 MG: 20 INJECTION, SOLUTION INTRAVENOUS at 12:22

## 2025-04-29 NOTE — ANESTHESIA PREPROCEDURE EVALUATION
Anesthesia Evaluation     Patient summary reviewed and Nursing notes reviewed   NPO Solid Status: > 8 hours  NPO Liquid Status: > 4 hours           Airway   Mallampati: II  TM distance: >3 FB  Neck ROM: full  Possible difficult intubation and No difficulty expected  Dental - normal exam     Pulmonary - normal exam   (+) a smoker (2008) Former,sleep apnea on CPAP  Cardiovascular - normal exam  Exercise tolerance: good (4-7 METS)    (+) hyperlipidemia    ROS comment: EKG: Sinus rhythm with blocked PACs  Left axis deviatio      Neuro/Psych  (+) headaches (migraines)  GI/Hepatic/Renal/Endo    (+) obesity, morbid obesity, hepatitis, diabetes mellitus type 2 well controlled using insulin, thyroid problem hypothyroidism    ROS Comment: Hyperparathyroid disease     Musculoskeletal     Abdominal    Substance History   (+) alcohol use     OB/GYN          Other   arthritis,     ROS/Med Hx Other: Hypercalcemia   RLS              Anesthesia Plan    ASA 3     MAC     (Risks and benefits discussed including risk of aspiration, recall and dental damage. All patient questions answered. Will continue with POC.)  intravenous induction     Anesthetic plan, risks, benefits, and alternatives have been provided, discussed and informed consent has been obtained with: patient.    Plan discussed with CRNA.    CODE STATUS:

## 2025-04-29 NOTE — DISCHARGE INSTRUCTIONS
- Discharge patient to home (ambulatory).   - High fiber diet.   - Continue present medications.   - Await pathology results.   - Repeat colonoscopy in 6 months for surveillance.   - Return to GI office in 8 weeks.   Rest today  No pushing,pulling,tugging,heavy lifting, or strenuous activity   No major decision making,driving,or drinking alcoholic beverages for 24 hours due to the sedation you received  Always use good hand hygiene/washing technique  No driving on pain medication.    To assist you in voiding:  Drink plenty of fluids  Listen to running water while attempting to void.    If you are unable to urinate and you have an uncomfortable urge to void or it has been   6 hours since you were discharged, return to the Emergency Room

## 2025-04-29 NOTE — H&P
[facility]  HISTORY AND PHYSICAL    Patient Name: Miller Em  : 1960  MRN: [id number]    Chief Complaint:   For screening  colonoscopy    History Of Presenting Illness:    Previous colon with poor prep    Past Medical History:   Diagnosis Date    Allergic floxin,lisinipril    Arthritis     Benign prostatic hyperplasia     Cataract     Colon polyp     Diabetes     Diabetes mellitus     Elevated cholesterol     GERD (gastroesophageal reflux disease)     Gout     Headache, migraine     Headache, tension-type     Hyperlipidemia     Hypertension     Impaired mobility     Infectious viral hepatitis     Obesity     Sleep apnea     Tear of meniscus of knee     Thyroid condition     Vitamin D deficiency        Past Surgical History:   Procedure Laterality Date    ANAL FISSURECTOMY      COLONOSCOPY      COLONOSCOPY N/A 2025    Procedure: COLONOSCOPY ABORTED DUE TO POOR PREP;  Surgeon: Morris Serrano MD;  Location: [facility] ENDOSCOPY;  Service: Gastroenterology;  Laterality: N/A;    ENDOSCOPY N/A 2025    Procedure: ESOPHAGOGASTRODUODENOSCOPY WITH BIOPSIES;  Surgeon: Morris Serrano MD;  Location: [facility] ENDOSCOPY;  Service: Gastroenterology;  Laterality: N/A;    EYE SURGERY      Cataract    KNEE ARTHROSCOPY      THYROID SURGERY         Social History     Socioeconomic History    Marital status: Single   Tobacco Use    Smoking status: Former     Current packs/day: 0.00     Average packs/day: 0.3 packs/day for 5.2 years (1.3 ttl pk-yrs)     Types: Cigarettes     Start date: 2003     Quit date: 2008     Years since quittin.3     Passive exposure: Past    Smokeless tobacco: Never    Tobacco comments:     on and off x 10-15 years 1 pack per week   Vaping Use    Vaping status: Never Used   Substance and Sexual Activity    Alcohol use: Yes     Alcohol/week: 6.0 standard drinks of alcohol     Types: 3 Cans of beer, 3 Shots of liquor per week     Comment: 1-2 wkly    Drug use:  Never    Sexual activity: Defer       Family History   Problem Relation Age of Onset    Stroke Father     Hyperlipidemia Brother     Stroke Other     Hyperlipidemia Other     Colon cancer Neg Hx     Esophageal cancer Neg Hx     Stomach cancer Neg Hx        Prior to Admission Medications:  Medications Prior to Admission   Medication Sig Dispense Refill Last Dose/Taking    Accu-Chek FastClix Lancets misc TEST DAILY FOR DM E11.9 100 each 3 Past Week    allopurinol (ZYLOPRIM) 300 MG tablet TAKE 1 TABLET BY MOUTH DAILY 90 tablet 3 Past Week    aspirin 81 MG EC tablet Take 1 tablet by mouth Daily. 30 tablet 11 Past Week    atorvastatin (LIPITOR) 20 MG tablet Take 1 tablet by mouth every night at bedtime. 90 tablet 3 Past Week    B-12, Methylcobalamin, 1000 MCG sublingual tablet Place 1 tablet under the tongue Daily. 90 tablet 3 Past Week    BD Pen Needle Agnes 2nd Gen 32G X 4 MM misc USE TWICE DAILY WITH INSULIN 200 each 3 Past Week    bisacodyl (DULCOLAX) 5 MG EC tablet Take as directed for colon prep 4 tablet 0 4/28/2025    Cholecalciferol (Vitamin D3) 1.25 MG (39362 UT) capsule Take 1 capsule by mouth Every 7 (Seven) Days. 12 capsule 4 Past Week    Continuous Glucose Sensor (Dexcom G7 Sensor) misc Use 1 each Every 10 (Ten) Days. 1 each 0 Taking    famotidine (PEPCID) 40 MG tablet Take 1 tablet by mouth 2 (Two) Times a Day. 180 tablet 1 Past Week    fluticasone (Flonase) 50 MCG/ACT nasal spray 2 sprays into the nostril(s) as directed by provider Daily. (Patient taking differently: Administer 2 sprays into the nostril(s) as directed by provider As Needed.) 16 g 11 Past Week    furosemide (LASIX) 20 MG tablet Take 1 tablet by mouth Daily.   Taking    glucose blood test strip Patient tests blood sugar one time daily.  Diagnosis code E11.9 100 each 3 Past Week    latanoprost (XALATAN) 0.005 % ophthalmic solution Administer 1 drop to both eyes Daily.   Past Week    levothyroxine (SYNTHROID, LEVOTHROID) 150 MCG tablet TAKE 1  TABLET BY MOUTH DAILY 90 tablet 3 Past Week    losartan (COZAAR) 100 MG tablet TAKE 1 TABLET BY MOUTH DAILY 90 tablet 3 Past Week    metFORMIN (GLUCOPHAGE) 500 MG tablet Take 1 tablet by mouth Daily With Breakfast. 90 tablet 3 Past Week    metoprolol succinate XL (TOPROL-XL) 50 MG 24 hr tablet TAKE 1 TABLET BY MOUTH DAILY 90 tablet 3 Past Week    NovoLIN 70/30 FlexPen (70-30) 100 UNIT/ML suspension pen-injector ADMINISTER 20 UNITS UNDER THE SKIN THREE TIMES DAILY BEFORE MEALS AS DIRECTED (Patient taking differently: Inject 20 Units under the skin into the appropriate area as directed 2 (Two) Times a Day.) 45 mL 3 Past Week    sodium-potassium-magnesium sulfates (Suprep Bowel Prep Kit) 17.5-3.13-1.6 GM/177ML solution oral solution Use as directed for colonoscopy prep. Patient has instructions. 177 mL 0 Past Week    tamsulosin (FLOMAX) 0.4 MG capsule 24 hr capsule Take 1 capsule by mouth Daily. 90 capsule 3 Past Week       Allergies:  Allergies   Allergen Reactions    Floxin [Ofloxacin] Hives    Lisinopril Cough and Other (See Comments)        Vitals: Temp:  [97.2 °F (36.2 °C)] 97.2 °F (36.2 °C)  Heart Rate:  [86] 86  Resp:  [16] 16  BP: (157)/(115) 157/115    Review Of Systems:  Constitutional:  Negative for chills, fever, and unexpected weight change.  Respiratory:  Negative for cough, chest tightness, shortness of breath, and wheezing.  Cardiovascular:  Negative for chest pain, palpitations, and leg swelling.  Gastrointestinal:  Negative for abdominal distention, abdominal pain, nausea, vomiting.  Neurological:  Negative for weakness, numbness, and headaches.     Physical Exam:    General Appearance:  Alert, cooperative, in no acute distress.   Lungs:   Clear to auscultation, respirations regular, even and                 unlabored.   Heart:  Regular rhythm and normal rate.   Abdomen:   Normal bowel sounds, no masses, no organomegaly. Soft, nontender, nondistended   Neurologic: Alert and oriented x 3. Moves all  four limbs equally       Assessment & Plan     Assessment:  Principal Problem:    Encounter for screening for malignant neoplasm of colon      Plan: Colonoscopy with possible biopsy, polypectomy, ablation of arteriovenous malformations, or control of bleeding. (N/A)     Rayo Gomez MD  4/29/2025

## 2025-04-29 NOTE — ANESTHESIA POSTPROCEDURE EVALUATION
Patient: Miller Em    Procedure Summary       Date: 04/29/25 Room / Location: [facility] ENDOSCOPY 2 / [facility] ENDOSCOPY    Anesthesia Start: 1217 Anesthesia Stop: 1327    Procedure: Colonoscopy with polypectomy emr mantis clip x2 resolution clip x2 endoscopic tattoo and eleview (Anus) Diagnosis:       Encounter for screening for malignant neoplasm of colon      (Encounter for screening for malignant neoplasm of colon [Z12.11])    Surgeons: Rayo Gomez MD Provider: Jean Claude Kelly CRNA    Anesthesia Type: MAC ASA Status: 3            Anesthesia Type: MAC    Vitals  Vitals Value Taken Time   /107 04/29/25 13:47   Temp 97 °F (36.1 °C) 04/29/25 13:27   Pulse 84 04/29/25 13:47   Resp 15 04/29/25 13:47   SpO2 98 % 04/29/25 13:47           Post Anesthesia Care and Evaluation    Patient location during evaluation: PHASE II  Patient participation: complete - patient participated  Level of consciousness: awake  Pain score: 1  Pain management: adequate    Airway patency: patent  Anesthetic complications: No anesthetic complications  PONV Status: controlled  Cardiovascular status: acceptable and stable  Respiratory status: acceptable  Hydration status: acceptable    Comments: See Nursing record for post procedural Vital Signs as per protocol.

## 2025-05-01 LAB — REF LAB TEST METHOD: NORMAL

## 2025-05-06 DIAGNOSIS — R09.A2 GLOBUS SENSATION: ICD-10-CM

## 2025-05-06 DIAGNOSIS — E21.3 HYPERPARATHYROIDISM: ICD-10-CM

## 2025-05-06 DIAGNOSIS — E78.00 PURE HYPERCHOLESTEROLEMIA: ICD-10-CM

## 2025-05-06 DIAGNOSIS — E03.9 ACQUIRED HYPOTHYROIDISM: ICD-10-CM

## 2025-05-06 DIAGNOSIS — R49.0 HOARSENESS: ICD-10-CM

## 2025-05-06 DIAGNOSIS — I10 ESSENTIAL HYPERTENSION: ICD-10-CM

## 2025-05-06 RX ORDER — ATORVASTATIN CALCIUM 20 MG/1
20 TABLET, FILM COATED ORAL
Qty: 90 TABLET | Refills: 3 | OUTPATIENT
Start: 2025-05-06

## 2025-05-06 RX ORDER — TAMSULOSIN HYDROCHLORIDE 0.4 MG/1
1 CAPSULE ORAL DAILY
Qty: 90 CAPSULE | Refills: 3 | OUTPATIENT
Start: 2025-05-06

## 2025-05-06 RX ORDER — PEN NEEDLE, DIABETIC 32GX 5/32"
NEEDLE, DISPOSABLE MISCELLANEOUS
Qty: 200 EACH | Refills: 3 | OUTPATIENT
Start: 2025-05-06

## 2025-05-06 RX ORDER — LEVOTHYROXINE SODIUM 150 UG/1
150 TABLET ORAL DAILY
Qty: 90 TABLET | Refills: 3 | OUTPATIENT
Start: 2025-05-06

## 2025-05-06 RX ORDER — ALLOPURINOL 300 MG/1
300 TABLET ORAL DAILY
Qty: 90 TABLET | Refills: 3 | OUTPATIENT
Start: 2025-05-06

## 2025-05-06 RX ORDER — METOPROLOL SUCCINATE 50 MG/1
50 TABLET, EXTENDED RELEASE ORAL DAILY
Qty: 90 TABLET | Refills: 3 | OUTPATIENT
Start: 2025-05-06

## 2025-05-06 RX ORDER — LOSARTAN POTASSIUM 100 MG/1
100 TABLET ORAL DAILY
Qty: 90 TABLET | Refills: 3 | OUTPATIENT
Start: 2025-05-06

## 2025-05-06 NOTE — TELEPHONE ENCOUNTER
Caller: [facility] - [place], [place] - [address] - [phone] [facility] [phone] FX    Relationship: Pharmacy    Best call back number:     Requested Prescriptions:   Requested Prescriptions     Pending Prescriptions Disp Refills    allopurinol (ZYLOPRIM) 300 MG tablet 90 tablet 3     Sig: Take 1 tablet by mouth Daily.    BD Pen Needle Agnes 2nd Gen 32G X 4 MM misc 200 each 3    levothyroxine (SYNTHROID, LEVOTHROID) 150 MCG tablet 90 tablet 3     Sig: Take 1 tablet by mouth Daily.    losartan (COZAAR) 100 MG tablet 90 tablet 3     Sig: Take 1 tablet by mouth Daily.    tamsulosin (FLOMAX) 0.4 MG capsule 24 hr capsule 90 capsule 3     Sig: Take 1 capsule by mouth Daily.    metoprolol succinate XL (TOPROL-XL) 50 MG 24 hr tablet 90 tablet 3     Sig: Take 1 tablet by mouth Daily.    atorvastatin (LIPITOR) 20 MG tablet 90 tablet 3     Sig: Take 1 tablet by mouth every night at bedtime.    NOVOLIN 70/30- GAVE ME A STOP    Pharmacy where request should be sent: [facility] - [place], [place] - [address] [facility] [facility] - [phone] [facility] [phone] FX     Last office visit with prescribing clinician: 3/13/2025   Last telemedicine visit with prescribing clinician: Visit date not found   Next office visit with prescribing clinician: 6/18/2025     Additional details provided by patient:     Does the patient have less than a 3 day supply:  [x] Yes  [] No    Would you like a call back once the refill request has been completed: [] Yes [x] No    If the office needs to give you a call back, can they leave a voicemail: [] Yes [] No    Maria Eugenia Pritchard Rep   05/06/25 10:33 EDT

## 2025-06-17 LAB
25(OH)D3+25(OH)D2 SERPL-MCNC: 65.1 NG/ML (ref 30–100)
ALBUMIN SERPL-MCNC: 4.5 G/DL (ref 3.5–5.2)
ALBUMIN/CREAT UR: 11 MG/G CREAT (ref 0–29)
ALBUMIN/GLOB SERPL: 1.7 G/DL
ALP SERPL-CCNC: 74 U/L (ref 39–117)
ALT SERPL-CCNC: 20 U/L (ref 1–41)
AST SERPL-CCNC: 23 U/L (ref 1–40)
BASOPHILS # BLD AUTO: 0.06 10*3/MM3 (ref 0–0.2)
BASOPHILS NFR BLD AUTO: 1 % (ref 0–1.5)
BILIRUB SERPL-MCNC: 0.4 MG/DL (ref 0–1.2)
BUN SERPL-MCNC: 14 MG/DL (ref 8–23)
BUN/CREAT SERPL: 8.4 (ref 7–25)
CALCIUM SERPL-MCNC: 9.9 MG/DL (ref 8.6–10.5)
CHLORIDE SERPL-SCNC: 104 MMOL/L (ref 98–107)
CHOLEST SERPL-MCNC: 145 MG/DL (ref 0–200)
CO2 SERPL-SCNC: 27.5 MMOL/L (ref 22–29)
CREAT SERPL-MCNC: 1.67 MG/DL (ref 0.76–1.27)
CREAT UR-MCNC: 227.3 MG/DL
EGFRCR SERPLBLD CKD-EPI 2021: 44.6 ML/MIN/1.73
EOSINOPHIL # BLD AUTO: 0.17 10*3/MM3 (ref 0–0.4)
EOSINOPHIL NFR BLD AUTO: 2.8 % (ref 0.3–6.2)
ERYTHROCYTE [DISTWIDTH] IN BLOOD BY AUTOMATED COUNT: 16.9 % (ref 12.3–15.4)
GLOBULIN SER CALC-MCNC: 2.7 GM/DL
GLUCOSE SERPL-MCNC: 142 MG/DL (ref 65–99)
HBA1C MFR BLD: 7.4 % (ref 4.8–5.6)
HCT VFR BLD AUTO: 48.5 % (ref 37.5–51)
HDLC SERPL-MCNC: 35 MG/DL (ref 40–60)
HGB BLD-MCNC: 15.1 G/DL (ref 13–17.7)
IMM GRANULOCYTES # BLD AUTO: 0.03 10*3/MM3 (ref 0–0.05)
IMM GRANULOCYTES NFR BLD AUTO: 0.5 % (ref 0–0.5)
LDLC SERPL CALC-MCNC: 83 MG/DL (ref 0–100)
LYMPHOCYTES # BLD AUTO: 2.58 10*3/MM3 (ref 0.7–3.1)
LYMPHOCYTES NFR BLD AUTO: 42.8 % (ref 19.6–45.3)
MCH RBC QN AUTO: 27.9 PG (ref 26.6–33)
MCHC RBC AUTO-ENTMCNC: 31.1 G/DL (ref 31.5–35.7)
MCV RBC AUTO: 89.6 FL (ref 79–97)
MICROALBUMIN UR-MCNC: 25.1 UG/ML
MONOCYTES # BLD AUTO: 0.78 10*3/MM3 (ref 0.1–0.9)
MONOCYTES NFR BLD AUTO: 12.9 % (ref 5–12)
NEUTROPHILS # BLD AUTO: 2.41 10*3/MM3 (ref 1.7–7)
NEUTROPHILS NFR BLD AUTO: 40 % (ref 42.7–76)
NRBC BLD AUTO-RTO: 0 /100 WBC (ref 0–0.2)
PLATELET # BLD AUTO: 219 10*3/MM3 (ref 140–450)
POTASSIUM SERPL-SCNC: 4.9 MMOL/L (ref 3.5–5.2)
PROT SERPL-MCNC: 7.2 G/DL (ref 6–8.5)
PSA SERPL-MCNC: 3.45 NG/ML (ref 0–4)
RBC # BLD AUTO: 5.41 10*6/MM3 (ref 4.14–5.8)
SODIUM SERPL-SCNC: 142 MMOL/L (ref 136–145)
TRIGL SERPL-MCNC: 153 MG/DL (ref 0–150)
VIT B12 SERPL-MCNC: 1560 PG/ML (ref 211–946)
VLDLC SERPL CALC-MCNC: 27 MG/DL (ref 5–40)
WBC # BLD AUTO: 6.03 10*3/MM3 (ref 3.4–10.8)

## 2025-06-18 ENCOUNTER — OFFICE VISIT (OUTPATIENT)
Dept: INTERNAL MEDICINE | Facility: CLINIC | Age: 65
End: 2025-06-18
Payer: MEDICARE

## 2025-06-18 VITALS
SYSTOLIC BLOOD PRESSURE: 128 MMHG | OXYGEN SATURATION: 98 % | HEART RATE: 70 BPM | BODY MASS INDEX: 46.63 KG/M2 | TEMPERATURE: 97.5 F | DIASTOLIC BLOOD PRESSURE: 79 MMHG | HEIGHT: 69 IN | WEIGHT: 314.8 LBS | RESPIRATION RATE: 20 BRPM

## 2025-06-18 DIAGNOSIS — E03.9 ACQUIRED HYPOTHYROIDISM: ICD-10-CM

## 2025-06-18 DIAGNOSIS — E78.00 PURE HYPERCHOLESTEROLEMIA: ICD-10-CM

## 2025-06-18 DIAGNOSIS — R49.0 HOARSENESS: ICD-10-CM

## 2025-06-18 DIAGNOSIS — R09.A2 GLOBUS SENSATION: ICD-10-CM

## 2025-06-18 DIAGNOSIS — I10 ESSENTIAL HYPERTENSION: ICD-10-CM

## 2025-06-18 DIAGNOSIS — E21.3 HYPERPARATHYROIDISM: ICD-10-CM

## 2025-06-18 DIAGNOSIS — Z79.4 TYPE 2 DIABETES MELLITUS WITH OTHER CIRCULATORY COMPLICATION, WITH LONG-TERM CURRENT USE OF INSULIN: ICD-10-CM

## 2025-06-18 DIAGNOSIS — E11.59 TYPE 2 DIABETES MELLITUS WITH OTHER CIRCULATORY COMPLICATION, WITH LONG-TERM CURRENT USE OF INSULIN: ICD-10-CM

## 2025-06-18 DIAGNOSIS — Z12.5 ENCOUNTER FOR SCREENING FOR MALIGNANT NEOPLASM OF PROSTATE: ICD-10-CM

## 2025-06-18 RX ORDER — LEVOTHYROXINE SODIUM 150 UG/1
150 TABLET ORAL DAILY
Qty: 90 TABLET | Refills: 3 | Status: SHIPPED | OUTPATIENT
Start: 2025-06-18

## 2025-06-18 RX ORDER — CHOLECALCIFEROL (VITAMIN D3) 125 MCG
1 CAPSULE ORAL DAILY
Qty: 90 TABLET | Refills: 3 | Status: SHIPPED | OUTPATIENT
Start: 2025-06-18 | End: 2026-12-17

## 2025-06-18 RX ORDER — TAMSULOSIN HYDROCHLORIDE 0.4 MG/1
1 CAPSULE ORAL DAILY
Qty: 90 CAPSULE | Refills: 3 | Status: SHIPPED | OUTPATIENT
Start: 2025-06-18

## 2025-06-18 RX ORDER — METOPROLOL SUCCINATE 50 MG/1
50 TABLET, EXTENDED RELEASE ORAL DAILY
Qty: 90 TABLET | Refills: 3 | Status: SHIPPED | OUTPATIENT
Start: 2025-06-18

## 2025-06-18 RX ORDER — ALLOPURINOL 300 MG/1
300 TABLET ORAL DAILY
Qty: 90 TABLET | Refills: 3 | Status: SHIPPED | OUTPATIENT
Start: 2025-06-18

## 2025-06-18 RX ORDER — FAMOTIDINE 40 MG/1
40 TABLET, FILM COATED ORAL 2 TIMES DAILY
Qty: 90 TABLET | Refills: 3 | Status: SHIPPED | OUTPATIENT
Start: 2025-06-18

## 2025-06-18 RX ORDER — LOSARTAN POTASSIUM 100 MG/1
100 TABLET ORAL DAILY
Qty: 90 TABLET | Refills: 3 | Status: SHIPPED | OUTPATIENT
Start: 2025-06-18

## 2025-06-18 RX ORDER — FLUTICASONE PROPIONATE 50 MCG
2 SPRAY, SUSPENSION (ML) NASAL AS NEEDED
Qty: 48 G | Refills: 3 | Status: SHIPPED | OUTPATIENT
Start: 2025-06-18

## 2025-06-18 RX ORDER — ASPIRIN 81 MG/1
81 TABLET ORAL DAILY
Qty: 90 TABLET | Refills: 3 | Status: SHIPPED | OUTPATIENT
Start: 2025-06-18

## 2025-06-18 RX ORDER — ATORVASTATIN CALCIUM 20 MG/1
20 TABLET, FILM COATED ORAL
Qty: 90 TABLET | Refills: 3 | Status: SHIPPED | OUTPATIENT
Start: 2025-06-18

## 2025-06-18 NOTE — PROGRESS NOTES
Subjective     Patient ID: Miller Em is a 65 y.o. male. Patient is here for management of multiple medical problems.     Chief Complaint   Patient presents with    Follow-up     3 month FU Diabetes     History of Present Illness     Off insulin for the most part.   Ha1c up a bit. Not bad.  BS 's low.           The following portions of the patient's history were reviewed and updated as appropriate: allergies, current medications, past family history, past medical history, past social history, past surgical history and problem list.    Review of Systems    Current Outpatient Medications:     allopurinol (ZYLOPRIM) 300 MG tablet, Take 1 tablet by mouth Daily., Disp: 90 tablet, Rfl: 3    aspirin 81 MG EC tablet, Take 1 tablet by mouth Daily., Disp: 90 tablet, Rfl: 3    atorvastatin (LIPITOR) 20 MG tablet, Take 1 tablet by mouth every night at bedtime., Disp: 90 tablet, Rfl: 3    B-12, Methylcobalamin, 1000 MCG sublingual tablet, Place 1 tablet under the tongue Daily., Disp: 90 tablet, Rfl: 3    famotidine (PEPCID) 40 MG tablet, Take 1 tablet by mouth 2 (Two) Times a Day., Disp: 90 tablet, Rfl: 3    fluticasone (Flonase) 50 MCG/ACT nasal spray, Administer 2 sprays into the nostril(s) as directed by provider As Needed for Allergies., Disp: 48 g, Rfl: 3    levothyroxine (SYNTHROID, LEVOTHROID) 150 MCG tablet, Take 1 tablet by mouth Daily., Disp: 90 tablet, Rfl: 3    losartan (COZAAR) 100 MG tablet, Take 1 tablet by mouth Daily., Disp: 90 tablet, Rfl: 3    metFORMIN (GLUCOPHAGE) 500 MG tablet, Take 1 tablet by mouth Daily With Breakfast., Disp: 90 tablet, Rfl: 3    metoprolol succinate XL (TOPROL-XL) 50 MG 24 hr tablet, Take 1 tablet by mouth Daily., Disp: 90 tablet, Rfl: 3    tamsulosin (FLOMAX) 0.4 MG capsule 24 hr capsule, Take 1 capsule by mouth Daily., Disp: 90 capsule, Rfl: 3    Accu-Chek FastClix Lancets [facility] [place] DAILY FOR DM E11.9, Disp: 100 each, Rfl: 3    BD Pen Needle Agnes 2nd Gen 32G X 4  "MM misc, USE TWICE DAILY WITH INSULIN, Disp: 200 each, Rfl: 3    Cholecalciferol (Vitamin D3) 1.25 MG (05431 UT) capsule, Take 1 capsule by mouth Every 7 (Seven) Days., Disp: 12 capsule, Rfl: 4    Continuous Glucose Sensor (Dexcom G7 Sensor) misc, Use 1 each Every 10 (Ten) Days., Disp: 1 each, Rfl: 0    furosemide (LASIX) 20 MG tablet, Take 1 tablet by mouth Daily., Disp: , Rfl:     glucose blood test strip, Patient tests blood sugar one time daily.  Diagnosis code E11.9, Disp: 100 each, Rfl: 3    latanoprost (XALATAN) 0.005 % ophthalmic solution, Administer 1 drop to both eyes Daily., Disp: , Rfl:     NovoLIN 70/30 FlexPen (70-30) 100 UNIT/ML suspension pen-injector, ADMINISTER 20 UNITS UNDER THE SKIN THREE TIMES DAILY BEFORE MEALS AS DIRECTED (Patient taking differently: Inject 20 Units under the skin into the appropriate area as directed 2 (Two) Times a Day.), Disp: 45 mL, Rfl: 3    Objective      Blood pressure 128/79, pulse 70, temperature 97.5 °F (36.4 °C), resp. rate 20, height 175.3 cm (69\"), weight (!) 143 kg (314 lb 12.8 oz), SpO2 98%.            Physical Exam     General Appearance:    Alert, cooperative, no distress, appears stated age   Head:    Normocephalic, without obvious abnormality, atraumatic   Eyes:    PERRL, conjunctiva/corneas clear, EOM's intact   Ears:    Normal TM's and external ear canals, both ears   Nose:   Nares normal, septum midline, mucosa normal, no drainage   or sinus tenderness   Throat:   Lips, mucosa, and tongue normal; teeth and gums normal   Neck:   Supple, symmetrical, trachea midline, no adenopathy;        thyroid:  No enlargement/tenderness/nodules; no carotid    bruit or JVD   Back:     Symmetric, no curvature, ROM normal, no CVA tenderness   Lungs:     Clear to auscultation bilaterally, respirations unlabored   Chest wall:    No tenderness or deformity   Heart:    Regular rate and rhythm, S1 and S2 normal, no murmur,        rub or gallop   Abdomen:     Soft, non-tender, " bowel sounds active all four quadrants,     no masses, no organomegaly   Extremities:   Extremities normal, atraumatic, no cyanosis or edema   Pulses:   2+ and symmetric all extremities   Skin:   Skin color, texture, turgor normal, no rashes or lesions   Lymph nodes:   Cervical, supraclavicular, and axillary nodes normal   Neurologic:   CNII-XII intact. Normal strength, sensation and reflexes       throughout      Results for orders placed or performed during the hospital encounter of 25   POC Glucose Once    Collection Time: 25 10:18 AM    Specimen: Blood   Result Value Ref Range    Glucose 87 70 - 130 mg/dL   TISSUE EXAM, [facility] ([facility])    Collection Time: 25 12:31 PM    Specimen: A: Large Intestine, Cecum; Polyp    B: Large Intestine, Cecum; Tissue    C: Large Intestine, Right / Ascending Colon; Polyp    D: Large Intestine, Left / Descending Colon; Polyp   Result Value Ref Range    Reference Lab Report       [facility]  [address] [place] [address]  Phone: [phone] or [phone]  Fax: [phone]  Leland Baker M.D., Medical Director    PATIENT NAME                           LABORATORY NO.  JOHNSON MAN.              [id number]  [id number]                         AGE              SEX  N           CLIENT REF #  [facility]            65      1960      [id number]   [id number]    [address] [place] [address] [facility]-PASS                REQUESTING M.D.     ATTENDING M.D.     COPY TO.   [address]                        EMMANUEL BOOTH [address]  DATE COLLECTED      DATE RECEIVED      DATE REPORTED  2025    DIAGNOSIS:  A.   CECUM POLYPS, X 2:  Fragments of tubular adenoma  Negative for carcinoma or high-grade dysplasia    B.   CECUM POLYP, EMR:  Fragments of tubular adenoma  Negative for carcinoma or high-grade dysplasia    C.    " ASCENDING COLON POLYPS, X 3:  Fragments of tubular adenoma  Negative for carcinoma or high-grade dysplasia    D.   DESCENDING COLON POLYPS, X 4:  Fragments of tubular adenoma  Negative for carcinoma or high-grade dysplasia    CLINICAL HISTORY:  Encounter for screening for malignant neoplasm of colon    SPECIMENS RECEIVED:  A.  CECUM POLYPS, X 2  B.  CECUM POLYP, EMR  C.  ASCENDING COLON POLYPS, X 3  D.  DESCENDING COLON POLYPS, X 4    MICROSCOPIC DESCRIPTION:  Tissue blocks are prepared and slides are examined microscopically on all  specimens. See diagnosis for details.    Professional interpretation rendered by Lamberto Stephens M.D.,JJAJESSICA. at [employer], [address] [place] [address].    GROSS DESCRIPTION:  A.  Received in formalin labeled \"cecal polyp x 2\" are multiple portions of tan-  yellow soft tissue measuring 0.8 x 0.5 x 0.1 cm in aggregate, submitted  entirely in A1. MTS  B.  Specimen is received in formalin labeled as \"cecum polyp EMR\" are  multiple fragments of  tan soft tissue aggregating 1.1 x 1.0 x 0.3 cm, filtered  and submitted entirely in B1.  Photograph attached.  C.  Received in formalin labeled \"ascending colon polyp x 3\" are multiple  portions of tan soft tissue measuring 1.0 x 0.8 x 0.3 cm in aggregate,  filtered and submitted entirely in C1.  D.  Received in formalin labeled \"descending colon polyp x 4\" are multiple  portions of tan soft tissue mixed with possible vegetative matter measuring  0.6 x 0.6 x 0.2 cm in aggregate, submitted entirely in D1.    REVIEWED, DIAGNOSED AND ELECTRONICALLY  SIGNED BY:    Lamberto Stephens M.D.,F.C.A.P.  CPT CODES:  88305x4           Assessment & Plan   Diet   Going some better. Discussed options to do better.    Will need to stop crystal light tea.     Other diet changes discussed    .        Diagnoses and all orders for this visit:    1. Essential hypertension  -     atorvastatin (LIPITOR) 20 MG tablet; Take 1 tablet by mouth every night at " bedtime.  Dispense: 90 tablet; Refill: 3  -     losartan (COZAAR) 100 MG tablet; Take 1 tablet by mouth Daily.  Dispense: 90 tablet; Refill: 3  -     metoprolol succinate XL (TOPROL-XL) 50 MG 24 hr tablet; Take 1 tablet by mouth Daily.  Dispense: 90 tablet; Refill: 3  -     Vitamin B12  -     CBC & Differential  -     Lipid Panel  -     PSA Screen  -     Comprehensive Metabolic Panel  -     TSH  -     Vitamin D,25-Hydroxy  -     Hemoglobin A1c  -     Microalbumin / Creatinine Urine Ratio - Urine, Clean Catch    2. Acquired hypothyroidism  -     levothyroxine (SYNTHROID, LEVOTHROID) 150 MCG tablet; Take 1 tablet by mouth Daily.  Dispense: 90 tablet; Refill: 3  -     losartan (COZAAR) 100 MG tablet; Take 1 tablet by mouth Daily.  Dispense: 90 tablet; Refill: 3  -     Vitamin B12  -     CBC & Differential  -     Lipid Panel  -     PSA Screen  -     Comprehensive Metabolic Panel  -     TSH  -     Vitamin D,25-Hydroxy  -     Hemoglobin A1c  -     Microalbumin / Creatinine Urine Ratio - Urine, Clean Catch    3. Pure hypercholesterolemia  -     losartan (COZAAR) 100 MG tablet; Take 1 tablet by mouth Daily.  Dispense: 90 tablet; Refill: 3  -     Vitamin B12  -     CBC & Differential  -     Lipid Panel  -     PSA Screen  -     Comprehensive Metabolic Panel  -     TSH  -     Vitamin D,25-Hydroxy  -     Hemoglobin A1c  -     Microalbumin / Creatinine Urine Ratio - Urine, Clean Catch    4. Hyperparathyroidism  -     losartan (COZAAR) 100 MG tablet; Take 1 tablet by mouth Daily.  Dispense: 90 tablet; Refill: 3  -     Vitamin B12  -     CBC & Differential  -     Lipid Panel  -     PSA Screen  -     Comprehensive Metabolic Panel  -     TSH  -     Vitamin D,25-Hydroxy  -     Hemoglobin A1c  -     Microalbumin / Creatinine Urine Ratio - Urine, Clean Catch    5. Type 2 diabetes mellitus with other circulatory complication, with long-term current use of insulin  -     metFORMIN (GLUCOPHAGE) 500 MG tablet; Take 1 tablet by mouth Daily  With Breakfast.  Dispense: 90 tablet; Refill: 3  -     Vitamin B12  -     CBC & Differential  -     Lipid Panel  -     PSA Screen  -     Comprehensive Metabolic Panel  -     TSH  -     Vitamin D,25-Hydroxy  -     Hemoglobin A1c  -     Microalbumin / Creatinine Urine Ratio - Urine, Clean Catch    6. Hoarseness  -     famotidine (PEPCID) 40 MG tablet; Take 1 tablet by mouth 2 (Two) Times a Day.  Dispense: 90 tablet; Refill: 3  -     Vitamin B12  -     CBC & Differential  -     Lipid Panel  -     PSA Screen  -     Comprehensive Metabolic Panel  -     TSH  -     Vitamin D,25-Hydroxy  -     Hemoglobin A1c  -     Microalbumin / Creatinine Urine Ratio - Urine, Clean Catch    7. Globus sensation  -     famotidine (PEPCID) 40 MG tablet; Take 1 tablet by mouth 2 (Two) Times a Day.  Dispense: 90 tablet; Refill: 3  -     Vitamin B12  -     CBC & Differential  -     Lipid Panel  -     PSA Screen  -     Comprehensive Metabolic Panel  -     TSH  -     Vitamin D,25-Hydroxy  -     Hemoglobin A1c  -     Microalbumin / Creatinine Urine Ratio - Urine, Clean Catch    8. Encounter for screening for malignant neoplasm of prostate  -     PSA Screen    Other orders  -     allopurinol (ZYLOPRIM) 300 MG tablet; Take 1 tablet by mouth Daily.  Dispense: 90 tablet; Refill: 3  -     B-12, Methylcobalamin, 1000 MCG sublingual tablet; Place 1 tablet under the tongue Daily.  Dispense: 90 tablet; Refill: 3  -     tamsulosin (FLOMAX) 0.4 MG capsule 24 hr capsule; Take 1 capsule by mouth Daily.  Dispense: 90 capsule; Refill: 3  -     aspirin 81 MG EC tablet; Take 1 tablet by mouth Daily.  Dispense: 90 tablet; Refill: 3  -     fluticasone (Flonase) 50 MCG/ACT nasal spray; Administer 2 sprays into the nostril(s) as directed by provider As Needed for Allergies.  Dispense: 48 g; Refill: 3      Return in about 3 months (around 9/18/2025) for Annual physical, Medicare Wellness, Recheck.          There are no Patient Instructions on file for this visit.      Praful Rapp MD    Assessment & Plan

## 2025-06-26 ENCOUNTER — OFFICE VISIT (OUTPATIENT)
Dept: GASTROENTEROLOGY | Facility: CLINIC | Age: 65
End: 2025-06-26
Payer: MEDICARE

## 2025-06-26 VITALS
HEART RATE: 91 BPM | BODY MASS INDEX: 46.51 KG/M2 | WEIGHT: 314 LBS | HEIGHT: 69 IN | SYSTOLIC BLOOD PRESSURE: 118 MMHG | DIASTOLIC BLOOD PRESSURE: 74 MMHG | OXYGEN SATURATION: 96 % | TEMPERATURE: 97.6 F

## 2025-06-26 DIAGNOSIS — R09.A2 GLOBUS SENSATION: Chronic | ICD-10-CM

## 2025-06-26 DIAGNOSIS — D12.6 ADENOMATOUS POLYP OF COLON, UNSPECIFIED PART OF COLON: Primary | Chronic | ICD-10-CM

## 2025-06-26 DIAGNOSIS — R49.0 HOARSENESS: Chronic | ICD-10-CM

## 2025-06-26 PROCEDURE — 1160F RVW MEDS BY RX/DR IN RCRD: CPT | Performed by: NURSE PRACTITIONER

## 2025-06-26 PROCEDURE — 3074F SYST BP LT 130 MM HG: CPT | Performed by: NURSE PRACTITIONER

## 2025-06-26 PROCEDURE — 3078F DIAST BP <80 MM HG: CPT | Performed by: NURSE PRACTITIONER

## 2025-06-26 PROCEDURE — 1159F MED LIST DOCD IN RCRD: CPT | Performed by: NURSE PRACTITIONER

## 2025-06-26 PROCEDURE — 99214 OFFICE O/P EST MOD 30 MIN: CPT | Performed by: NURSE PRACTITIONER

## 2025-06-26 RX ORDER — BISACODYL 5 MG/1
TABLET, DELAYED RELEASE ORAL
Qty: 4 TABLET | Refills: 0 | Status: SHIPPED | OUTPATIENT
Start: 2025-06-26

## 2025-06-26 RX ORDER — SODIUM, POTASSIUM,MAG SULFATES 17.5-3.13G
SOLUTION, RECONSTITUTED, ORAL ORAL
Qty: 177 ML | Refills: 0 | Status: SHIPPED | OUTPATIENT
Start: 2025-06-26

## 2025-06-26 RX ORDER — SODIUM CHLORIDE 9 MG/ML
70 INJECTION, SOLUTION INTRAVENOUS CONTINUOUS PRN
OUTPATIENT
Start: 2025-06-26 | End: 2025-06-27

## 2025-06-26 NOTE — PATIENT INSTRUCTIONS
Antireflux measures: Avoid fried, fatty foods, alcohol, chocolate, coffee, tea,  soft drinks, all carbonated beverages (including sparkling water), peppermint and spearmint, spicy foods, tomatoes and tomato based foods, onion based foods, and garlic.   Other antireflux measures include weight reduction if overweight, avoiding tight clothing around the abdomen, elevating the head of the bed 6 inches with blocks under the head board, and don't drink or eat before going to bed and avoid lying down immediately after meals.  Continue to avoid vaping/smoking/marijuana/THC/CBD.   Famotidine 40 mg twice daily.  High fiber, low fat diet with liberal water intake.   Metamucil 1 packet/scoop daily or fiber gummies/capsules 2 per day.   Colonoscopy: The indications, technique, alternatives and potential risk and complications were discussed with the patient including but not limited to bleeding, perforations, missing lesions and anesthetic complications. The patient understands and wishes to proceed with the procedure and has given their verbal consent. Written patient education information was given to the patient.   The patient will call if they have further questions before procedure.

## 2025-06-26 NOTE — PROGRESS NOTES
Follow Up Note     Date: 2025   Patient Name: Milelr Em  MRN: [id number]  : 1960     Primary Care Provider: Praful Rapp MD     Chief Complaint   Patient presents with    Procedure    Follow-up     Doing well, no concerns     2025  History of Present Illness  The patient is a 65-year-old male who is here for follow-up after procedure to discuss results.    He has been doing well.  No new problems or concerns.  Hoarseness and globus sensation have improved since taking famotidine twice a day.       Interval History:  3/19/2025  The patient is a 64-year-old male who is here for follow-up to schedule a colonoscopy.     He was previously under the care of Dr. Serrano, who attempted a colonoscopy but was unsuccessful due to inadequate preparation. He states he completed the entire jug of GoLytely within the specified time frame and did the diet changes as instructed prior to the procedure. He reports no history of constipation. His bowel movements are typically regular, occurring 2 to 3 times daily, with the exception of one day when he did not have a bowel movement. At times stools are small roger initially, but they progress to a soft formed stool. He reports no presence of blood or black, tarry stools.     He has been prescribed famotidine twice daily, but his compliance has been inconsistent, often taking it once daily or not at all. He reports that the medication has not been effective in managing his hoarseness. He continues to experience symptoms intermittently. He was initiated on famotidine therapy due to complaints of a persistent sensation in his throat and coughing up phlegm, similar to having a cold, but this is unchanged.     1/10/2025 per Dr. Serrano  Miller Em is a 64 y.o. male who is here today to establish care with Gastroenterology for colon cancer screening.  Last colonoscopy, and upper endoscopy over a decade ago.  He is complaining of some  degree of hoarseness, and a globus sensation.  Also had a dry cough, which has persisted despite stopping the lisinopril which was thought to be a potential culprit.  Does not have typical acid reflux, but I wonder if she is having silent GERD.    Subjective      Past Medical History:   Diagnosis Date    Allergic floxin,lisinipril    Arthritis     Benign prostatic hyperplasia     Cataract     Colon polyp     Diabetes     Diabetes mellitus     Elevated cholesterol     GERD (gastroesophageal reflux disease)     Gout     Headache, migraine     Headache, tension-type     Hyperlipidemia     Hypertension     Impaired mobility     Infectious viral hepatitis     Obesity     Sleep apnea     Tear of meniscus of knee     Thyroid condition     Vitamin D deficiency      Past Surgical History:   Procedure Laterality Date    ANAL FISSURECTOMY      COLONOSCOPY      COLONOSCOPY N/A 2/6/2025    Procedure: COLONOSCOPY ABORTED DUE TO POOR PREP;  Surgeon: Morris Serrano MD;  Location: [facility] ENDOSCOPY;  Service: Gastroenterology;  Laterality: N/A;    COLONOSCOPY N/A 4/29/2025    Procedure: Colonoscopy with polypectomy, EMR, mantis clip placement x2, resolution clip x2 placement, endoscopic tattoo and eleview;  Surgeon: Rayo Gomez MD;  Location: [facility] ENDOSCOPY;  Service: Gastroenterology;  Laterality: N/A;    ENDOSCOPY N/A 2/6/2025    Procedure: ESOPHAGOGASTRODUODENOSCOPY WITH BIOPSIES;  Surgeon: Morris Serrano MD;  Location: [facility] ENDOSCOPY;  Service: Gastroenterology;  Laterality: N/A;    EYE SURGERY      Cataract    KNEE ARTHROSCOPY      THYROID SURGERY       Family History   Problem Relation Age of Onset    Stroke Father     Hyperlipidemia Brother     Stroke Other     Hyperlipidemia Other     Colon cancer Neg Hx     Esophageal cancer Neg Hx     Stomach cancer Neg Hx      Social History     Socioeconomic History    Marital status: Single   Tobacco Use    Smoking status: Former     Current packs/day: 0.00     Average  packs/day: 0.3 packs/day for 6.1 years (1.6 ttl pk-yrs)     Types: Cigarettes     Start date: 2003     Quit date: 2008     Years since quittin.4     Passive exposure: Past    Smokeless tobacco: Never    Tobacco comments:     on and off x 10-15 years 1 pack per week   Vaping Use    Vaping status: Never Used   Substance and Sexual Activity    Alcohol use: Yes     Alcohol/week: 6.0 standard drinks of alcohol     Types: 3 Cans of beer, 3 Shots of liquor per week     Comment: 1-2 wkly    Drug use: Never    Sexual activity: Yes     Partners: Female     Birth control/protection: Condom       Current Outpatient Medications:     Accu-Chek FastClix Lancets [facility], TEST DAILY FOR DM E11.9, Disp: 100 each, Rfl: 3    allopurinol (ZYLOPRIM) 300 MG tablet, Take 1 tablet by mouth Daily., Disp: 90 tablet, Rfl: 3    aspirin 81 MG EC tablet, Take 1 tablet by mouth Daily., Disp: 90 tablet, Rfl: 3    atorvastatin (LIPITOR) 20 MG tablet, Take 1 tablet by mouth every night at bedtime., Disp: 90 tablet, Rfl: 3    B-12, Methylcobalamin, 1000 MCG sublingual tablet, Place 1 tablet under the tongue Daily., Disp: 90 tablet, Rfl: 3    BD Pen Needle Agnes 2nd Gen 32G X 4 MM misc, USE TWICE DAILY WITH INSULIN, Disp: 200 each, Rfl: 3    Cholecalciferol (Vitamin D3) 1.25 MG (65352 UT) capsule, Take 1 capsule by mouth Every 7 (Seven) Days., Disp: 12 capsule, Rfl: 4    Continuous Glucose Sensor (Dexcom G7 Sensor) misc, Use 1 each Every 10 (Ten) Days., Disp: 1 each, Rfl: 0    famotidine (PEPCID) 40 MG tablet, Take 1 tablet by mouth 2 (Two) Times a Day., Disp: 90 tablet, Rfl: 3    fluticasone (Flonase) 50 MCG/ACT nasal spray, Administer 2 sprays into the nostril(s) as directed by provider As Needed for Allergies., Disp: 48 g, Rfl: 3    glucose blood test strip, Patient tests blood sugar one time daily.  Diagnosis code E11.9, Disp: 100 each, Rfl: 3    latanoprost (XALATAN) 0.005 % ophthalmic solution, Administer 1 drop to both eyes Daily.,  Disp: , Rfl:     levothyroxine (SYNTHROID, LEVOTHROID) 150 MCG tablet, Take 1 tablet by mouth Daily., Disp: 90 tablet, Rfl: 3    losartan (COZAAR) 100 MG tablet, Take 1 tablet by mouth Daily., Disp: 90 tablet, Rfl: 3    metFORMIN (GLUCOPHAGE) 500 MG tablet, Take 1 tablet by mouth Daily With Breakfast., Disp: 90 tablet, Rfl: 3    metoprolol succinate XL (TOPROL-XL) 50 MG 24 hr tablet, Take 1 tablet by mouth Daily., Disp: 90 tablet, Rfl: 3    NovoLIN 70/30 FlexPen (70-30) 100 UNIT/ML suspension pen-injector, ADMINISTER 20 UNITS UNDER THE SKIN THREE TIMES DAILY BEFORE MEALS AS DIRECTED (Patient taking differently: Inject 20 Units under the skin into the appropriate area as directed 2 (Two) Times a Day.), Disp: 45 mL, Rfl: 3    tamsulosin (FLOMAX) 0.4 MG capsule 24 hr capsule, Take 1 capsule by mouth Daily., Disp: 90 capsule, Rfl: 3    bisacodyl (DULCOLAX) 5 MG EC tablet, Take as directed for colon prep, Disp: 4 tablet, Rfl: 0    furosemide (LASIX) 20 MG tablet, Take 1 tablet by mouth Daily., Disp: , Rfl:     sodium-potassium-magnesium sulfates (Suprep Bowel Prep Kit) 17.5-3.13-1.6 GM/177ML solution oral solution, Use as directed for colonoscopy prep. Patient has instructions., Disp: 177 mL, Rfl: 0    Allergies   Allergen Reactions    Floxin [Ofloxacin] Hives    Lisinopril Cough and Other (See Comments)     The following portions of the patient's history were reviewed and updated as appropriate: allergies, current medications, past family history, past medical history, past social history, past surgical history and problem list.  Objective     Physical Exam  Vitals and nursing note reviewed.   Constitutional:       General: He is not in acute distress.     Appearance: Normal appearance. He is well-developed.   HENT:      Head: Normocephalic and atraumatic.      Mouth/Throat:      Mouth: Mucous membranes are not pale, not dry and not cyanotic.   Eyes:      General: Lids are normal.   Neck:      Trachea: Trachea normal.  "  Cardiovascular:      Rate and Rhythm: Normal rate.   Pulmonary:      Effort: Pulmonary effort is normal. No respiratory distress.      Breath sounds: Normal breath sounds.   Abdominal:      Tenderness: There is no abdominal tenderness.   Skin:     General: Skin is warm and dry.   Neurological:      Mental Status: He is alert and oriented to person, place, and time.   Psychiatric:         Mood and Affect: Mood normal.         Speech: Speech normal.         Behavior: Behavior normal. Behavior is cooperative.       Vitals:    25 1420   BP: 118/74   Pulse: 91   Temp: 97.6 °F (36.4 °C)   SpO2: 96%   Weight: (!) 142 kg (314 lb)   Height: 175.3 cm (69.02\")     Body mass index is 46.35 kg/m².     Results Review:   I reviewed the patient's new clinical results.    Admission on 2025, Discharged on 2025   Component Date Value Ref Range Status    Glucose 2025 87  70 - 130 mg/dL Final    Serial Number: [id number]:  [id number]    Reference Lab Report 2025    Final                    Value:[facility]  [address] [place] [address]  Phone: [phone] or [phone]  Fax: [phone]  Leland Baker M.D., Medical Director    PATIENT NAME                           LABORATORY NO.  JOHNSON MAN.              [id number]  [id number]                         AGE              SEX  N           CLIENT REF #  [facility]            65      1960      [id number]   [id number]    [address] [place] [address] BY-PASS                REQUESTING M.D.     ATTENDING M.D.     COPY TO.   [address]                        [place] [address]                 [facility]  DATE COLLECTED      DATE RECEIVED      DATE REPORTED  2025    DIAGNOSIS:  A.   CECUM POLYPS, X 2:  Fragments of tubular adenoma  Negative for carcinoma or high-grade dysplasia    B.   CECUM POLYP, EMR:  Fragments " of tubular adenoma  Negative for carcinoma or high-grade dysplasia    C.      ASCENDING COLON POLYPS, X 3:  Fragments of tubular adenoma  Negative for carcinoma or high-grade dysplasia    D.   DESCENDING COLON POLYPS, X 4:  Fragments of tubular adenoma  Negative for carcinoma or high-grade dysplasia     REVIEWED, DIAGNOSED AND ELECTRONICALLY  SIGNED BY:    Lamberto Stephens M.D.,ABHIJIT  CPT CODES:  88305x4        CBC & Differential (06/16/2025 10:16)  Comprehensive Metabolic Panel (06/16/2025 10:16)  Vitamin B12 (06/16/2025 10:16)    CTAP without contrast 4/4/2023  No acute disease.      Ultrasound thyroid 5/10/2024  Recommend 6 month follow-up exam of the TR 4 left thyroid nodule.      EGD 2/6/2025 per Dr. Serrano  - Esophagitis. Mild antral gastritis and bulbar duodenitis. Negative for peptic ulcer disease.  - Biopsies were taken with a cold forceps for Helicobacter pylori testing.  - Biopsies were taken with a cold forceps for evaluation of celiac disease.  - Biopsies were taken with a cold forceps for histology at the gastroesophageal junction.  A.     DUODENUM, BIOPSY:  Duodenal mucosa with no significant histopathologic abnormality  No evidence of dysplasia, carcinoma, or villous atrophy  B.     ANTRUM, BIOPSY:  Gastric mucosa with no significant histopathologic abnormality  No H. pylori organisms identified on routine stains  Negative for intestinal metaplasia, dysplasia, or carcinoma  C.     GE JUNCTION, Z LINE:  Squamocolumnar mucosa with mild nonspecific chronic inflammation  Negative for intestinal metaplasia, dysplasia, carcinoma, or increased  intraepithelial eosinophils      Colonoscopy 2/6/2025 per Dr. Serrano  - Poor prep  - No specimens collected.    Colonoscopy 4/29/2025 per Dr. Gomez  - Diverticulosis in the sigmoid colon, in the descending colon, at the hepatic flexure, in the ascending colon and in the cecum.  - One 4 mm polyp in the cecum, removed with a cold snare. Resected and retrieved.  -  One 20 to 25 mm polyp in the cecum, removed with mucosal resection. Complete resection. Partial retrieval. Clips were placed. Clip : [facility].  - One 6 mm polyp at the hepatic flexure, removed with a cold snare. Resected and only 1/3rd of the EMR specimen retrieved.  - One 4 to 5 mm polyp in the distal ascending colon, removed with a cold snare. Resected and retrieved.  - One 5 mm polyp in the proximal descending colon, removed with a cold snare. Resected and retrieved.  - Three 4 to 6 mm polyps in the proximal descending colon, in the mid descending colon and in the distal  descending colon, removed with a hot snare. Resected and retrieved.  - Non-bleeding external and internal hemorrhoids.  - Mucosal resection was performed. Resection was complete, but the tissue was only partially retrieved.  - Needs better examination of rt and left colon with next colonoscopy  - Extremely difficult colonoscopy due to looping, RIGO with excessive abdominal breathing and contractions  A.   CECUM POLYPS, X 2:  Fragments of tubular adenoma  Negative for carcinoma or high-grade dysplasia  B.   CECUM POLYP, EMR:  Fragments of tubular adenoma  Negative for carcinoma or high-grade dysplasia  C.   ASCENDING COLON POLYPS, X 3:  Fragments of tubular adenoma  Negative for carcinoma or high-grade dysplasia  D.   DESCENDING COLON POLYPS, X 4:  Fragments of tubular adenoma  Negative for carcinoma or high-grade dysplasia     Assessment / Plan      1. Adenomatous polyp of colon, unspecified part of colon  Colonoscopy dated 4/29/2025 with multiple polyps removed.  One 20 to 25 mm polyp in the cecum removed with mucosal resection.  Biopsies with tubular adenoma without dysplasia.  Extremely difficult colonoscopy due to looping, RIGO with excessive abdominal breathing and contractions.  Needs better examination of right and left colon with next colonoscopy.  Colonoscopy for surveillance in 6 months.    -  sodium-potassium-magnesium sulfates (Suprep Bowel Prep Kit) 17.5-3.13-1.6 GM/177ML solution oral solution; Use as directed for colonoscopy prep. Patient has instructions.  Dispense: 177 mL; Refill: 0  - bisacodyl (DULCOLAX) 5 MG EC tablet; Take as directed for colon prep  Dispense: 4 tablet; Refill: 0  - Case Request    2. Hoarseness  3. Globus sensation  Symptoms have improved with famotidine 40 mg twice a day. EGD dated 2/6/2025 per Dr. Serrano with esophagitis, otherwise unremarkable.  Biopsies unremarkable.  He was recommended to take famotidine 40 mg instead of PPI due to his CKD.  Antireflux measures.  Continue famotidine twice a day for now.  Consider reducing dose in the future.    Patient Instructions   Antireflux measures: Avoid fried, fatty foods, alcohol, chocolate, coffee, tea,  soft drinks, all carbonated beverages (including sparkling water), peppermint and spearmint, spicy foods, tomatoes and tomato based foods, onion based foods, and garlic.   Other antireflux measures include weight reduction if overweight, avoiding tight clothing around the abdomen, elevating the head of the bed 6 inches with blocks under the head board, and don't drink or eat before going to bed and avoid lying down immediately after meals.  Continue to avoid vaping/smoking/marijuana/THC/CBD.   Famotidine 40 mg twice daily.  High fiber, low fat diet with liberal water intake.   Metamucil 1 packet/scoop daily or fiber gummies/capsules 2 per day.   Colonoscopy: The indications, technique, alternatives and potential risk and complications were discussed with the patient including but not limited to bleeding, perforations, missing lesions and anesthetic complications. The patient understands and wishes to proceed with the procedure and has given their verbal consent. Written patient education information was given to the patient.   The patient will call if they have further questions before procedure.     Marysol Colmenares,  APRN  6/26/2025    Please note that portions of this note were completed with a voice recognition program.     Patient or patient representative verbalized consent for the use of Ambient Listening during the visit with  ODETTE Caballero for chart documentation. 6/26/2025  14:36 EDT

## 2025-07-10 RX ORDER — PEN NEEDLE, DIABETIC 32GX 5/32"
1 NEEDLE, DISPOSABLE MISCELLANEOUS 2 TIMES DAILY
Qty: 200 EACH | Refills: 3 | Status: SHIPPED | OUTPATIENT
Start: 2025-07-10

## 2025-07-10 NOTE — TELEPHONE ENCOUNTER
Caller: Miller Em    Relationship: Self    Best call back number: [phone]     Requested Prescriptions:   Requested Prescriptions     Pending Prescriptions Disp Refills    BD Pen Needle Agnes 2nd Gen 32G X 4 MM misc 200 each 3        Pharmacy where request should be sent: [facility] - [facility] [address] - [phone] PH - [phone] FX     Last office visit with prescribing clinician: 6/18/2025   Last telemedicine visit with prescribing clinician: Visit date not found   Next office visit with prescribing clinician: 9/18/2025     Additional details provided by patient: OUT OF THESE

## 2025-07-18 DIAGNOSIS — E03.9 ACQUIRED HYPOTHYROIDISM: ICD-10-CM

## 2025-07-18 DIAGNOSIS — I10 ESSENTIAL HYPERTENSION: ICD-10-CM

## 2025-07-18 RX ORDER — METOPROLOL SUCCINATE 50 MG/1
50 TABLET, EXTENDED RELEASE ORAL DAILY
Qty: 90 TABLET | Refills: 3 | Status: SHIPPED | OUTPATIENT
Start: 2025-07-18

## 2025-07-18 RX ORDER — TAMSULOSIN HYDROCHLORIDE 0.4 MG/1
1 CAPSULE ORAL DAILY
Qty: 90 CAPSULE | Refills: 3 | Status: SHIPPED | OUTPATIENT
Start: 2025-07-18

## 2025-07-18 RX ORDER — LEVOTHYROXINE SODIUM 150 UG/1
150 TABLET ORAL DAILY
Qty: 90 TABLET | Refills: 3 | Status: SHIPPED | OUTPATIENT
Start: 2025-07-18

## 2025-08-14 ENCOUNTER — TELEPHONE (OUTPATIENT)
Dept: INTERNAL MEDICINE | Facility: CLINIC | Age: 65
End: 2025-08-14
Payer: MEDICARE

## 2025-08-14 DIAGNOSIS — Z79.4 TYPE 2 DIABETES MELLITUS WITH OTHER CIRCULATORY COMPLICATION, WITH LONG-TERM CURRENT USE OF INSULIN: Primary | ICD-10-CM

## 2025-08-14 DIAGNOSIS — E11.59 TYPE 2 DIABETES MELLITUS WITH OTHER CIRCULATORY COMPLICATION, WITH LONG-TERM CURRENT USE OF INSULIN: Primary | ICD-10-CM

## 2025-08-15 RX ORDER — KETOROLAC TROMETHAMINE 30 MG/ML
1 INJECTION, SOLUTION INTRAMUSCULAR; INTRAVENOUS TAKE AS DIRECTED
Qty: 1 EACH | Refills: 0 | Status: SHIPPED | OUTPATIENT
Start: 2025-08-15

## 2025-08-15 RX ORDER — ACYCLOVIR 800 MG/1
1 TABLET ORAL
Qty: 6 EACH | Refills: 1 | Status: SHIPPED | OUTPATIENT
Start: 2025-08-15

## (undated) DEVICE — LUBE JELLY PK/2.75GM STRL BX/144

## (undated) DEVICE — NDL SCLEROTHERAPY INTERJECT 25G 4 240CM

## (undated) DEVICE — Device

## (undated) DEVICE — SYR LUER SLPTP 50ML

## (undated) DEVICE — Device: Brand: SPOT EX ENDOSCOPIC TATTOO

## (undated) DEVICE — SINGLE-USE POLYPECTOMY SNARE: Brand: CAPTIVATOR II

## (undated) DEVICE — NET RESCUENET F/B RETRV

## (undated) DEVICE — ENDOSCOPY PORT CONNECTOR FOR OLYMPUS® SCOPES: Brand: ERBE

## (undated) DEVICE — PATIENT RETURN ELECTRODE, SINGLE-USE, CONTACT QUALITY MONITORING, ADULT, WITH 15 FT (4.5 M) CORD. FOR PATIENTS WEIGHING OVER 33LBS. (15KG): Brand: MEGADYNE

## (undated) DEVICE — INJ SUB/MUCUS ELEVIEW FOR/GI/ENDO/PROC AMPL/10ML

## (undated) DEVICE — HYBRID CO2 TUBING/CAP SET FOR OLYMPUS® SCOPES & CO2 SOURCE: Brand: ERBE

## (undated) DEVICE — CANISTER, RIGID, 2000CC: Brand: MEDLINE INDUSTRIES, INC.

## (undated) DEVICE — VLV SXN AIR/H2O ORCAPOD3 1P/U STRL

## (undated) DEVICE — FRCP BX RADJAW4 NDL 2.8 240 STD OG

## (undated) DEVICE — CONMED SCOPE SAVER BITE BLOCK, 20X27 MM: Brand: SCOPE SAVER